# Patient Record
Sex: FEMALE | Race: WHITE | Employment: OTHER | URBAN - NONMETROPOLITAN AREA
[De-identification: names, ages, dates, MRNs, and addresses within clinical notes are randomized per-mention and may not be internally consistent; named-entity substitution may affect disease eponyms.]

---

## 2018-03-08 ENCOUNTER — OFFICE VISIT (OUTPATIENT)
Dept: VASCULAR SURGERY | Age: 78
End: 2018-03-08
Payer: MEDICARE

## 2018-03-08 VITALS
DIASTOLIC BLOOD PRESSURE: 80 MMHG | HEIGHT: 66 IN | SYSTOLIC BLOOD PRESSURE: 138 MMHG | WEIGHT: 116.6 LBS | HEART RATE: 62 BPM | BODY MASS INDEX: 18.74 KG/M2

## 2018-03-08 DIAGNOSIS — L03.116 CELLULITIS OF LEFT LEG: ICD-10-CM

## 2018-03-08 DIAGNOSIS — I87.2 VENOUS INSUFFICIENCY (CHRONIC) (PERIPHERAL): ICD-10-CM

## 2018-03-08 DIAGNOSIS — I65.23 ASYMPTOMATIC BILATERAL CAROTID ARTERY STENOSIS: Primary | ICD-10-CM

## 2018-03-08 DIAGNOSIS — M79.89 LEFT LEG SWELLING: ICD-10-CM

## 2018-03-08 PROCEDURE — G8420 CALC BMI NORM PARAMETERS: HCPCS | Performed by: SURGERY

## 2018-03-08 PROCEDURE — G8484 FLU IMMUNIZE NO ADMIN: HCPCS | Performed by: SURGERY

## 2018-03-08 PROCEDURE — 99215 OFFICE O/P EST HI 40 MIN: CPT | Performed by: SURGERY

## 2018-03-08 PROCEDURE — 4004F PT TOBACCO SCREEN RCVD TLK: CPT | Performed by: SURGERY

## 2018-03-08 PROCEDURE — 1090F PRES/ABSN URINE INCON ASSESS: CPT | Performed by: SURGERY

## 2018-03-08 PROCEDURE — 4040F PNEUMOC VAC/ADMIN/RCVD: CPT | Performed by: SURGERY

## 2018-03-08 PROCEDURE — G8427 DOCREV CUR MEDS BY ELIG CLIN: HCPCS | Performed by: SURGERY

## 2018-03-08 PROCEDURE — 1123F ACP DISCUSS/DSCN MKR DOCD: CPT | Performed by: SURGERY

## 2018-03-08 PROCEDURE — G8598 ASA/ANTIPLAT THER USED: HCPCS | Performed by: SURGERY

## 2018-03-08 PROCEDURE — G8400 PT W/DXA NO RESULTS DOC: HCPCS | Performed by: SURGERY

## 2018-03-08 NOTE — PROGRESS NOTES
[address] [facility] [address]  [facility] VASCULAR SURG  [phone]  Meme  1940  69 y. o.female       Chief Complaint:  Chief Complaint   Patient presents with    Other     redness and swelling right leg        History of present Illness:  Pt is here today for evaluation and treatment of her venous insufficiency and carotid stenosis. Recently she was admitted at [facility] with extensive left leg swelling. She went underwent an extensive workup which was negative. There is no evidence of deep venous thrombosis. Presently she is on doxycycline for cellulitis. Her leg is been too big for her to get stockings on. On questioning her she denies any amaurosis fugax or lateralizing symptoms. Past Medical History:  has a past medical history of Asthma; Backache, unspecified; Breast cancer, left ([address] Chronic venous insufficiency; COPD (chronic obstructive pulmonary disease) ([address] Coronary atherosclerosis of unspecified type of vessel, native or graft; Edema; History of tobacco abuse; Hyperlipidemia; Hypertension; Hypertension; Osteoarthritis; PVD (peripheral vascular disease) ([address] Superficial thrombosis of leg; Unspecified sinusitis (chronic); Unspecified urinary incontinence; and Unspecified vitamin D deficiency. Past Surgical History:   Past Surgical History:   Procedure Laterality Date    APPENDECTOMY      EYE SURGERY Bilateral 12/2015    cataracts/ROB [place]    HYSTERECTOMY, VAGINAL  8/13/1974    JOINT REPLACEMENT Right     Knee    MASTECTOMY Left        Social History:  reports that she has been smoking Cigarettes. She has a 47.00 pack-year smoking history. She has never used smokeless tobacco. She reports that she does not drink alcohol or use drugs. Family History: family history includes Arthritis in her father and mother; Cancer in her sister; Heart Disease in her brother;  Other in her

## 2018-04-13 ENCOUNTER — HOSPITAL ENCOUNTER (OUTPATIENT)
Dept: INTERVENTIONAL RADIOLOGY/VASCULAR | Age: 78
Discharge: HOME | End: 2018-04-15
Payer: MEDICARE

## 2018-04-13 DIAGNOSIS — I65.23 ASYMPTOMATIC BILATERAL CAROTID ARTERY STENOSIS: ICD-10-CM

## 2018-04-13 PROCEDURE — 93880 EXTRACRANIAL BILAT STUDY: CPT

## 2018-05-10 ENCOUNTER — OFFICE VISIT (OUTPATIENT)
Dept: VASCULAR SURGERY | Age: 78
End: 2018-05-10
Payer: MEDICARE

## 2018-05-10 VITALS
HEART RATE: 60 BPM | BODY MASS INDEX: 17.68 KG/M2 | DIASTOLIC BLOOD PRESSURE: 68 MMHG | RESPIRATION RATE: 16 BRPM | SYSTOLIC BLOOD PRESSURE: 150 MMHG | HEIGHT: 66 IN | WEIGHT: 110 LBS

## 2018-05-10 DIAGNOSIS — I87.2 VENOUS INSUFFICIENCY (CHRONIC) (PERIPHERAL): Primary | ICD-10-CM

## 2018-05-10 DIAGNOSIS — I65.23 ASYMPTOMATIC BILATERAL CAROTID ARTERY STENOSIS: ICD-10-CM

## 2018-05-10 DIAGNOSIS — I73.9 PVD (PERIPHERAL VASCULAR DISEASE) (HCC): ICD-10-CM

## 2018-05-10 DIAGNOSIS — I87.2 VENOUS INSUFFICIENCY OF BOTH LOWER EXTREMITIES: ICD-10-CM

## 2018-05-10 DIAGNOSIS — I87.2 VENOUS INSUFFICIENCY: ICD-10-CM

## 2018-05-10 PROCEDURE — G8427 DOCREV CUR MEDS BY ELIG CLIN: HCPCS | Performed by: SURGERY

## 2018-05-10 PROCEDURE — G8400 PT W/DXA NO RESULTS DOC: HCPCS | Performed by: SURGERY

## 2018-05-10 PROCEDURE — 99214 OFFICE O/P EST MOD 30 MIN: CPT | Performed by: SURGERY

## 2018-05-10 PROCEDURE — 4004F PT TOBACCO SCREEN RCVD TLK: CPT | Performed by: SURGERY

## 2018-05-10 PROCEDURE — 4040F PNEUMOC VAC/ADMIN/RCVD: CPT | Performed by: SURGERY

## 2018-05-10 PROCEDURE — 1123F ACP DISCUSS/DSCN MKR DOCD: CPT | Performed by: SURGERY

## 2018-05-10 PROCEDURE — 1090F PRES/ABSN URINE INCON ASSESS: CPT | Performed by: SURGERY

## 2018-05-10 PROCEDURE — G8419 CALC BMI OUT NRM PARAM NOF/U: HCPCS | Performed by: SURGERY

## 2018-05-10 PROCEDURE — G8598 ASA/ANTIPLAT THER USED: HCPCS | Performed by: SURGERY

## 2018-05-24 ENCOUNTER — TELEPHONE (OUTPATIENT)
Dept: VASCULAR SURGERY | Age: 78
End: 2018-05-24

## 2018-06-01 DIAGNOSIS — I87.2 VENOUS INSUFFICIENCY OF BOTH LOWER EXTREMITIES: ICD-10-CM

## 2018-06-01 DIAGNOSIS — I65.23 ASYMPTOMATIC BILATERAL CAROTID ARTERY STENOSIS: ICD-10-CM

## 2018-06-01 DIAGNOSIS — I87.323 IDIOPATHIC CHRONIC VENOUS HYPERTENSION OF BOTH LOWER EXTREMITIES WITH INFLAMMATION: ICD-10-CM

## 2018-06-01 DIAGNOSIS — I73.9 PVD (PERIPHERAL VASCULAR DISEASE) (HCC): Primary | ICD-10-CM

## 2018-06-01 DIAGNOSIS — I87.2 VENOUS INSUFFICIENCY (CHRONIC) (PERIPHERAL): ICD-10-CM

## 2018-06-08 ENCOUNTER — HOSPITAL ENCOUNTER (OUTPATIENT)
Dept: INTERVENTIONAL RADIOLOGY/VASCULAR | Age: 78
Discharge: HOME | End: 2018-06-10
Payer: MEDICARE

## 2018-06-08 DIAGNOSIS — I87.2 VENOUS INSUFFICIENCY OF BOTH LOWER EXTREMITIES: ICD-10-CM

## 2018-06-08 DIAGNOSIS — I87.323 IDIOPATHIC CHRONIC VENOUS HYPERTENSION OF BOTH LOWER EXTREMITIES WITH INFLAMMATION: ICD-10-CM

## 2018-06-08 PROCEDURE — 93970 EXTREMITY STUDY: CPT

## 2018-07-12 ENCOUNTER — OFFICE VISIT (OUTPATIENT)
Dept: VASCULAR SURGERY | Age: 78
End: 2018-07-12
Payer: MEDICARE

## 2018-07-12 VITALS
BODY MASS INDEX: 21.71 KG/M2 | HEART RATE: 84 BPM | DIASTOLIC BLOOD PRESSURE: 60 MMHG | WEIGHT: 110.6 LBS | HEIGHT: 60 IN | SYSTOLIC BLOOD PRESSURE: 136 MMHG

## 2018-07-12 DIAGNOSIS — I80.02 SUPERFICIAL PHLEBITIS AND THROMBOPHLEBITIS OF LEFT LOWER EXTREMITY: ICD-10-CM

## 2018-07-12 DIAGNOSIS — I87.2 VENOUS INSUFFICIENCY OF BOTH LOWER EXTREMITIES: Primary | ICD-10-CM

## 2018-07-12 DIAGNOSIS — I65.23 CAROTID STENOSIS, ASYMPTOMATIC, BILATERAL: ICD-10-CM

## 2018-07-12 PROCEDURE — G8427 DOCREV CUR MEDS BY ELIG CLIN: HCPCS | Performed by: SURGERY

## 2018-07-12 PROCEDURE — 1123F ACP DISCUSS/DSCN MKR DOCD: CPT | Performed by: SURGERY

## 2018-07-12 PROCEDURE — 4004F PT TOBACCO SCREEN RCVD TLK: CPT | Performed by: SURGERY

## 2018-07-12 PROCEDURE — 1101F PT FALLS ASSESS-DOCD LE1/YR: CPT | Performed by: SURGERY

## 2018-07-12 PROCEDURE — G8400 PT W/DXA NO RESULTS DOC: HCPCS | Performed by: SURGERY

## 2018-07-12 PROCEDURE — 99214 OFFICE O/P EST MOD 30 MIN: CPT | Performed by: SURGERY

## 2018-07-12 PROCEDURE — 1090F PRES/ABSN URINE INCON ASSESS: CPT | Performed by: SURGERY

## 2018-07-12 PROCEDURE — G8598 ASA/ANTIPLAT THER USED: HCPCS | Performed by: SURGERY

## 2018-07-12 PROCEDURE — G8420 CALC BMI NORM PARAMETERS: HCPCS | Performed by: SURGERY

## 2018-07-12 PROCEDURE — 4040F PNEUMOC VAC/ADMIN/RCVD: CPT | Performed by: SURGERY

## 2018-07-12 NOTE — PROGRESS NOTES
[address] [facility]  [facility] VASCULAR SURG  [phone]  Elsybelnelly  1940  69 y. o.female       Chief Complaint:  Chief Complaint   Patient presents with    Other     follow up with venous duplex        History of present Illness:  Seen following her recent vascular testing. Left lower extremity venous duplex of venous insufficiency protocol does demonstrate some mild superficial femoral phlebitis involving a few varicosities. There is no evidence of deep venous some pulses. Reflux was noted in the left popliteal vein and the left lesser saphenous vein. Since her last visit her lower extremity swelling has resolved however she continues to complain of some burning in both legs. Past Medical History:  has a past medical history of Asthma; Backache, unspecified; Breast cancer, left ([address] Chronic venous insufficiency; COPD (chronic obstructive pulmonary disease) ([address] Coronary atherosclerosis of unspecified type of vessel, native or graft; Edema; History of tobacco abuse; Hyperlipidemia; Hypertension; Hypertension; Osteoarthritis; PVD (peripheral vascular disease) ([address] Superficial thrombosis of leg; Unspecified sinusitis (chronic); Unspecified urinary incontinence; and Unspecified vitamin D deficiency. Past Surgical History:   Past Surgical History:   Procedure Laterality Date    APPENDECTOMY      EYE SURGERY Bilateral 12/2015    cataracts/ROB Senior[place]    HYSTERECTOMY, VAGINAL  8/13/1974    JOINT REPLACEMENT Right     Knee    MASTECTOMY Left        Social History:  reports that she has been smoking Cigarettes. She has a 47.00 pack-year smoking history. She has never used smokeless tobacco. She reports that she does not drink alcohol or use drugs. Family History: family history includes Arthritis in her father and mother; Cancer in her sister; Heart Disease in her brother;  Other in her

## 2019-01-01 ENCOUNTER — NURSE ONLY (OUTPATIENT)
Dept: UROLOGY | Age: 79
End: 2019-01-01
Payer: MEDICARE

## 2019-01-01 VITALS
DIASTOLIC BLOOD PRESSURE: 70 MMHG | BODY MASS INDEX: 17.16 KG/M2 | HEART RATE: 72 BPM | WEIGHT: 100.53 LBS | SYSTOLIC BLOOD PRESSURE: 134 MMHG | HEIGHT: 64 IN

## 2019-01-01 VITALS
OXYGEN SATURATION: 94 % | SYSTOLIC BLOOD PRESSURE: 138 MMHG | HEIGHT: 64 IN | WEIGHT: 100.53 LBS | DIASTOLIC BLOOD PRESSURE: 76 MMHG | RESPIRATION RATE: 20 BRPM | HEART RATE: 78 BPM | BODY MASS INDEX: 17.16 KG/M2

## 2019-01-01 DIAGNOSIS — R33.9 URINARY RETENTION: Primary | ICD-10-CM

## 2019-01-01 PROCEDURE — 51702 INSERT TEMP BLADDER CATH: CPT | Performed by: UROLOGY

## 2019-01-01 PROCEDURE — 51705 CHANGE OF BLADDER TUBE: CPT | Performed by: UROLOGY

## 2019-04-05 ENCOUNTER — TELEPHONE (OUTPATIENT)
Dept: UROLOGY | Age: 79
End: 2019-04-05

## 2019-04-05 NOTE — TELEPHONE ENCOUNTER
Patient informed that the 24th is the next time  Is in [facility]. Patient requested the [facility] office number.

## 2019-04-24 ENCOUNTER — OFFICE VISIT (OUTPATIENT)
Dept: UROLOGY | Age: 79
End: 2019-04-24
Payer: MEDICARE

## 2019-04-24 VITALS
BODY MASS INDEX: 21.73 KG/M2 | WEIGHT: 110.67 LBS | SYSTOLIC BLOOD PRESSURE: 110 MMHG | HEIGHT: 60 IN | HEART RATE: 60 BPM | DIASTOLIC BLOOD PRESSURE: 62 MMHG

## 2019-04-24 DIAGNOSIS — R33.9 INCOMPLETE BLADDER EMPTYING: Primary | ICD-10-CM

## 2019-04-24 PROCEDURE — 99203 OFFICE O/P NEW LOW 30 MIN: CPT | Performed by: UROLOGY

## 2019-04-24 NOTE — PROGRESS NOTES
Dr. Uday King MD  [facility] Urology Clinic Consultation / New Patient Visit    Patient:  Jamel Mendez  YOB: 1940  Date: 4/24/2019  Consult requested from Tariq Wagner MD     HISTORY OF PRESENT ILLNESS:   The patient is a 69 y.o. female who presents today for follow-up for the following problem(s): incomplete bladder emptying  Overall the problem(s) : are improving. Associated Symptoms: No dysuria, gross hematuria. Pain Severity:      Today visit:   4/24/19  Inpatient consult  Had catheter placed for incomplete bladder emptying, and urinary retention  Suspected secondary to immobility  She states she is doing better now  Tolerating catheter well    Summary of old records:   (Patient's old records, notes and chart reviewed and summarized above.)    Urinalysis today:  No results found for this visit on 04/24/19.     Last BUN and creatinine:  No results found for: BUN  No results found for: CREATININE    Imaging Reviewed during this Office Visit:   (results were independently reviewed by physician and radiology report verified)    PAST MEDICAL, FAMILY AND SOCIAL HISTORY:  Past Medical History:   Diagnosis Date    Asthma     Backache, unspecified     Breast cancer, left ([address]     Chronic venous insufficiency     COPD (chronic obstructive pulmonary disease) ([address]     Coronary atherosclerosis of unspecified type of vessel, native or graft     Edema     History of tobacco abuse     Hyperlipidemia     Hypertension     Hypertension     Osteoarthritis     PVD (peripheral vascular disease) ([address]     Superficial thrombosis of leg     Unspecified sinusitis (chronic)     Unspecified urinary incontinence     Unspecified vitamin D deficiency      Past Surgical History:   Procedure Laterality Date    APPENDECTOMY      EYE SURGERY Bilateral 12/2015    cataracts/Jeramie Senior    HYSTERECTOMY, VAGINAL  8/13/1974    JOINT REPLACEMENT Right     Knee    MASTECTOMY Left      Family History   Problem Relation Age of Onset    Other Mother         COPD    Arthritis Mother     Arthritis Father     Cancer Sister         breast    Heart Disease Brother      No outpatient medications have been marked as taking for the 4/24/19 encounter (Office Visit) with Yu Irwin MD.       Codeine  Social History     Tobacco Use   Smoking Status Current Every Day Smoker    Packs/day: 1.00    Years: 47.00    Pack years: 47.00    Types: Cigarettes   Smokeless Tobacco Never Used       Social History     Substance and Sexual Activity   Alcohol Use No    Alcohol/week: 0.0 oz       REVIEW OF SYSTEMS:  Constitutional: negative  Eyes: negative  Respiratory: negative  Cardiovascular: negative  Gastrointestinal: negative  Genitourinary: negative  Musculoskeletal: negative  Skin: negative   Neurological: negative  Hematological/Lymphatic: negative  Psychological: negative    Physical Exam:    This a 69 y.o. female      Vitals:    04/24/19 0915   BP: 110/62   Pulse: 60     Constitutional: Patient in no acute distress, in wheelchair  Neuro: alert and oriented to person place and time. Psych: Mood and affect normal.  Head: atraumatic normocephalic  Eyes: EOMi  HEENT: neck supple, trachea midline  Lungs: Respiratory effort normal  Cardiovascular:  Normal peripheral pulses  Abdomen: Soft, non-tender, non-distended, No CVA  Bladder: non-tender and not distended. FROMx4, no cyanosis clubbing edema  Skin: warm and dry  [place]arez: clear yellow urine    Assessment and Plan      1. Incomplete bladder emptying           Plan:      No follow-ups on file. Remove catheter today  Timed voiding  Will schedule Cysto Urodynamics to further evaluate  If unable to void, she may need catheter replaced.

## 2019-04-26 ENCOUNTER — NURSE ONLY (OUTPATIENT)
Dept: UROLOGY | Age: 79
End: 2019-04-26
Payer: MEDICARE

## 2019-04-26 VITALS
BODY MASS INDEX: 21.73 KG/M2 | DIASTOLIC BLOOD PRESSURE: 64 MMHG | WEIGHT: 110.67 LBS | SYSTOLIC BLOOD PRESSURE: 112 MMHG | HEIGHT: 60 IN | HEART RATE: 87 BPM

## 2019-04-26 DIAGNOSIS — R33.9 URINARY RETENTION: ICD-10-CM

## 2019-04-26 DIAGNOSIS — R33.9 INCOMPLETE BLADDER EMPTYING: Primary | ICD-10-CM

## 2019-04-26 PROCEDURE — 51702 INSERT TEMP BLADDER CATH: CPT | Performed by: UROLOGY

## 2019-04-26 PROCEDURE — 51798 US URINE CAPACITY MEASURE: CPT | Performed by: UROLOGY

## 2019-04-26 NOTE — PROGRESS NOTES
Patient presented for a PVR. States he has urinated except for \"a few dribbles\" since getting her catheter pulled on 4/24/19 Post void residual by bladder scanner 1,000cc. Following Dr. Kathryn James of care:    Changed 16 FR urethral ruiz catheter inserted without difficulty. 16 FR urethral ruiz was inserted without difficulty. Upon urine return, balloon inflated with 10cc sterile water. Urethral Ruiz catheter was hooked up to leg bag with straps. Patient instructed on catheter care including draining catheter bag and keeping catheter bag above the knee to prevent pulling on catheter causing blood. Patient will follow up for cysto/urodynamincs at [facility] with Dr. Simi Wilder as planned.

## 2019-04-30 ENCOUNTER — INITIAL CONSULT (OUTPATIENT)
Dept: SURGERY | Age: 79
End: 2019-04-30
Payer: MEDICARE

## 2019-04-30 VITALS
SYSTOLIC BLOOD PRESSURE: 115 MMHG | DIASTOLIC BLOOD PRESSURE: 76 MMHG | WEIGHT: 122.3 LBS | BODY MASS INDEX: 22.5 KG/M2 | OXYGEN SATURATION: 96 % | HEART RATE: 75 BPM | HEIGHT: 62 IN

## 2019-04-30 DIAGNOSIS — K80.20 GALLSTONES: ICD-10-CM

## 2019-04-30 DIAGNOSIS — R19.7 DIARRHEA, UNSPECIFIED TYPE: ICD-10-CM

## 2019-04-30 DIAGNOSIS — R11.2 NAUSEA AND VOMITING, INTRACTABILITY OF VOMITING NOT SPECIFIED, UNSPECIFIED VOMITING TYPE: Primary | ICD-10-CM

## 2019-04-30 PROCEDURE — 99203 OFFICE O/P NEW LOW 30 MIN: CPT | Performed by: SURGERY

## 2019-04-30 RX ORDER — CELECOXIB 200 MG/1
200 CAPSULE ORAL DAILY
COMMUNITY
End: 2020-01-01

## 2019-04-30 RX ORDER — FERROUS SULFATE 325(65) MG
325 TABLET ORAL
COMMUNITY
End: 2020-01-01

## 2019-04-30 NOTE — PROGRESS NOTES
Subjective   Teetee Allen is a 69 y.o. female with multiple medical comorbidities who presents for evaluation of gallstones. She is referred by her PCP. Patient is here with her daughter today who helps provide some of the medical history. Patient reports that several weeks ago she began to experience episodes of nausea and emesis that occurred sporadically throughout the day. At about the same time she states that she was having multiple bouts of diarrhea. Since the onset of symptoms she does report that her symptoms have seemed to have improved drastically and she only has occasional nausea and emesis now and the diarrhea seems to mostly have resolved. Of note she states that these symptoms started soon after she had back surgery. Her surgery seems to been complicated by significant urinary retention and she still has catheter in place. She has been seen by urology for these issues and is scheduled to undergo urodynamics for further evaluation. During her issues with nausea and emesis she did see her PCP and workup was undertaken which included a CT scan. Review of the CT scan shows that the patient doesn't fact have gallstones and there was some mild distention of the gallbladder without any signs of acute inflammation. Due to the patient's symptoms and CT findings he was referred to general surgery. On further questioning today the patient denies any abdominal pain that accompanied her symptoms and states she has never had any significant abdominal pain. She does report back pain that was present prior to the finding of urinary retention but she states once the catheter was placed the symptoms seem to have resolved.     Past Medical History:   Diagnosis Date    Asthma     Backache, unspecified     Breast cancer, left (HCC)     Chronic venous insufficiency     COPD (chronic obstructive pulmonary disease) (HCC)     Coronary atherosclerosis of unspecified type of vessel, native or graft     Edema  History of tobacco abuse     Hyperlipidemia     Hypertension     Hypertension     Osteoarthritis     PVD (peripheral vascular disease) (HCC)     Superficial thrombosis of leg     Unspecified sinusitis (chronic)     Unspecified urinary incontinence     Unspecified vitamin D deficiency        Past Surgical History:   Procedure Laterality Date    APPENDECTOMY      COLONOSCOPY      EYE SURGERY Bilateral 12/2015    cataracts/ROB [place]    HYSTERECTOMY, VAGINAL  8/13/1974    JOINT REPLACEMENT Right     Knee    MASTECTOMY Left     SHOULDER ARTHROPLASTY Left 2016       Current Outpatient Medications   Medication Sig Dispense Refill    celecoxib (CELEBREX) 200 MG capsule Take 200 mg by mouth daily      ferrous sulfate 325 (65 Fe) MG tablet Take 325 mg by mouth daily (with breakfast)      FLOVENT  MCG/ACT inhaler       PROVENTIL  (90 BASE) MCG/ACT inhaler       NONFORMULARY daily. VITAMIN D3 1000 units/CALCIUM 1200 units.  atorvastatin (LIPITOR) 10 MG tablet Take 10 mg by mouth daily.  warfarin (COUMADIN) 4 MG tablet Take 4 mg by mouth daily.  amLODIPine (NORVASC) 10 MG tablet Take 10 mg by mouth daily.  losartan-hydrochlorothiazide (HYZAAR) 50-12.5 MG per tablet Take 1 tablet by mouth daily.  montelukast (SINGULAIR) 10 MG tablet Take 10 mg by mouth nightly.  Cholecalciferol (VITAMIN D3) 1000 UNITS TABS Take  by mouth daily.  Calcium Carbonate-Vit D-Min (CALCIUM 1200 PO) Take  by mouth daily.  Acetaminophen (TYLENOL PO) Take  by mouth as needed. No current facility-administered medications for this visit.         Allergies   Allergen Reactions    Codeine        Family History   Problem Relation Age of Onset    Other Mother         COPD    Arthritis Mother     Arthritis Father     Cancer Sister         breast    Heart Disease Brother        Social History     Socioeconomic History    Marital status:      Spouse name: Not on file  Number of children: Not on file    Years of education: Not on file    Highest education level: Not on file   Occupational History    Not on file   Social Needs    Financial resource strain: Not on file    Food insecurity:     Worry: Not on file     Inability: Not on file    Transportation needs:     Medical: Not on file     Non-medical: Not on file   Tobacco Use    Smoking status: Current Every Day Smoker     Packs/day: 1.00     Years: 47.00     Pack years: 47.00     Types: Cigarettes    Smokeless tobacco: Never Used   Substance and Sexual Activity    Alcohol use: No     Alcohol/week: 0.0 oz    Drug use: No    Sexual activity: Not on file   Lifestyle    Physical activity:     Days per week: Not on file     Minutes per session: Not on file    Stress: Not on file   Relationships    Social connections:     Talks on phone: Not on file     Gets together: Not on file     Attends [employer] service: Not on file     Active member of club or organization: Not on file     Attends meetings of clubs or organizations: Not on file     Relationship status: Not on file    Intimate partner violence:     Fear of current or ex partner: Not on file     Emotionally abused: Not on file     Physically abused: Not on file     Forced sexual activity: Not on file   Other Topics Concern    Not on file   Social History Narrative    Not on file       ROS:   Review of Systems - General ROS: negative for - chills, fever or weight loss  Psychological ROS: negative  Ophthalmic ROS: negative  ENT ROS: Pt reports occasional difficulty swallowing liquids. No issues with swallowing solid foods. Allergy and Immunology ROS: negative  Hematological and Lymphatic ROS: negative  Endocrine ROS: negative  Respiratory ROS: Pt has shortness of breath at baseline.   Long time smoker  Cardiovascular ROS: no chest pain   Gastrointestinal ROS: per HPI  Genito-Urinary ROS: no dysuria, trouble voiding, or hematuria  Musculoskeletal ROS: negative      Objective   Vitals:    04/30/19 1404   BP: 115/76   Pulse: 75   SpO2: 96%     General:in no apparent distress, well developed and well nourished, alert and oriented times 3  Eyes: PERRL and EOMI  Ears, Nose, Throat: external ear and ear canal normal bilaterally, oropharynx clear and moist with normal mucous membranes  Neck: neck supple and non tender without mass  Lungs: Diminished breath sounds bilaterally, no wheezes or rhonchi   Heart: S1S2, no mumurs, RRR  Abdomen: soft, non tender, non distended, bowel sounds presnt  Extremity: Pt has bilateral pitting edema  Neuro: CN II-XII grossly intact      Assessment     3  41-year-old female with symptoms of nausea and vomiting as well as diarrhea-per patient report symptoms have improved drastically over the past week  2. Recent kyphoplasty  3. Urinary retention - Urology following and pt having work up    Plan     1. After discussion with the patient today and review of available records patient doesn't fact have gallstones with some mild dilation of gallbladder but with no overt signs of acute cholecystitis. I discussed with the patient today that while gallstones can certainly cause symptoms of nausea, emesis, and even diarrhea there is no acute need to proceed with any surgical intervention to remove her gallbladder. As the patient's symptoms started soon after her kyphoplasty and urinary retention and have now improved since her urinary retention is being addressed I cannot completely rule out that perhaps her symptoms were related to these issues and not to any gallbladder etiology. As the patient has no acute need for cholecystectomy and based on her medical comorbidities I discussed with her that I would recommend holding off on surgery at this time and would like to see her other issues with her back and urinary retention addressed and controlled.   Should she continue to have symptoms after these issues are under good control we can revisit the gallbladder and possible cholecystectomy. However I also discussed with her that based on her comorbidities and healthy at baseline she would be higher risk for surgery. 2.  I discussed with the patient signs and symptoms to be aware of including development of right upper quadrant pain, worsening of her nausea or emesis, systemic signs of infection and jaundice has potential complications of gallstone migration. I discussed with her that should she have any these symptoms she should call my office or come to the ER for further evaluation. 3.  Pt will continue follow up with pain management, urology and orthopedics. 4.  F/u with general surgery as needed.     Electronically signed by Carol Lion DO on 4/30/2019 at 2:28 PM

## 2019-05-09 ENCOUNTER — TELEPHONE (OUTPATIENT)
Dept: UROLOGY | Age: 79
End: 2019-05-09

## 2019-05-21 ENCOUNTER — TELEPHONE (OUTPATIENT)
Dept: UROLOGY | Age: 79
End: 2019-05-21

## 2019-05-21 NOTE — TELEPHONE ENCOUNTER
Jaki Johnson phoned wondering when her one month appt was to see Dr. Robyn Prather .   I did not see anything scheduled please call patient I believe she has a folley cath /

## 2019-05-23 ENCOUNTER — TELEPHONE (OUTPATIENT)
Dept: UROLOGY | Age: 79
End: 2019-05-23

## 2019-05-23 NOTE — TELEPHONE ENCOUNTER
LM- patient is going to be set up for cysto/UD at [facility]- which is scheduled through [facility]. I let her know they will be reaching out to her.

## 2019-05-23 NOTE — TELEPHONE ENCOUNTER
Patient called and asked when she is scheduled for [facility] with [address] I see in chart where she was scheduled for June 5th at 9:30 am but this was cancelled. Is the patient rescheduled or does this need to be done? Patient request a call back with new date and time for appt.

## 2019-06-11 DIAGNOSIS — R52 PAIN: Primary | ICD-10-CM

## 2019-06-14 ENCOUNTER — OFFICE VISIT (OUTPATIENT)
Dept: ORTHOPEDIC SURGERY | Age: 79
End: 2019-06-14
Payer: MEDICARE

## 2019-06-14 VITALS — HEIGHT: 62 IN | WEIGHT: 122.36 LBS | BODY MASS INDEX: 22.52 KG/M2

## 2019-06-14 DIAGNOSIS — R60.9 CHRONIC EDEMA: ICD-10-CM

## 2019-06-14 DIAGNOSIS — G62.9 NEUROPATHY: Primary | ICD-10-CM

## 2019-06-14 PROCEDURE — 99203 OFFICE O/P NEW LOW 30 MIN: CPT | Performed by: ORTHOPAEDIC SURGERY

## 2019-06-17 ASSESSMENT — ENCOUNTER SYMPTOMS
EYE PAIN: 0
ABDOMINAL PAIN: 0
SHORTNESS OF BREATH: 0

## 2019-07-07 ASSESSMENT — ENCOUNTER SYMPTOMS
ABDOMINAL PAIN: 0
EYE PAIN: 0
SHORTNESS OF BREATH: 0

## 2019-07-17 ENCOUNTER — TELEPHONE (OUTPATIENT)
Dept: UROLOGY | Age: 79
End: 2019-07-17

## 2019-07-17 NOTE — TELEPHONE ENCOUNTER
Patient cancelled today's appointment due to no transportation. She will need rescheduled for a catheter exchange.   # [phone]

## 2019-07-18 ENCOUNTER — NURSE ONLY (OUTPATIENT)
Dept: UROLOGY | Age: 79
End: 2019-07-18
Payer: MEDICARE

## 2019-07-18 VITALS — BODY MASS INDEX: 22.52 KG/M2 | HEART RATE: 72 BPM | WEIGHT: 122.36 LBS | OXYGEN SATURATION: 98 % | HEIGHT: 62 IN

## 2019-07-18 DIAGNOSIS — R33.9 URINARY RETENTION: ICD-10-CM

## 2019-07-18 PROCEDURE — 51702 INSERT TEMP BLADDER CATH: CPT | Performed by: UROLOGY

## 2019-07-31 ENCOUNTER — OFFICE VISIT (OUTPATIENT)
Dept: UROLOGY | Age: 79
End: 2019-07-31
Payer: MEDICARE

## 2019-07-31 VITALS
WEIGHT: 122.36 LBS | SYSTOLIC BLOOD PRESSURE: 148 MMHG | OXYGEN SATURATION: 93 % | HEIGHT: 62 IN | BODY MASS INDEX: 22.52 KG/M2 | DIASTOLIC BLOOD PRESSURE: 62 MMHG | HEART RATE: 82 BPM

## 2019-07-31 DIAGNOSIS — R33.9 INCOMPLETE BLADDER EMPTYING: ICD-10-CM

## 2019-07-31 DIAGNOSIS — R33.9 URINARY RETENTION: Primary | ICD-10-CM

## 2019-07-31 DIAGNOSIS — N31.2 ATONIC BLADDER: ICD-10-CM

## 2019-07-31 PROCEDURE — 99213 OFFICE O/P EST LOW 20 MIN: CPT | Performed by: UROLOGY

## 2019-07-31 NOTE — PROGRESS NOTES
Dr. Jammie Zeng MD  [facility] Urology Clinic Consultation / Follow up Visit    Patient:  Jori Churchill  YOB: 1940  Date: 7/31/2019  Consult requested from Sarthak Lucio MD     HISTORY OF PRESENT ILLNESS:   The patient is a 69 y.o. female who presents today for follow-up for the following problem(s): incomplete bladder emptying  Overall the problem(s) : are improving. Associated Symptoms: No dysuria, gross hematuria. 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     Last BUN and creatinine:  No results found for: BUN  No results found for: CREATININE    Imaging Reviewed during this Office Visit:   (results were independently reviewed by physician and radiology report verified)    PAST MEDICAL, FAMILY AND SOCIAL HISTORY:  Past Medical History:   Diagnosis Date    Asthma     Backache, unspecified     Breast cancer, left ([address]     Chronic venous insufficiency     COPD (chronic obstructive pulmonary disease) ([address]     Coronary atherosclerosis of unspecified type of vessel, native or graft     Edema     History of tobacco abuse     Hyperlipidemia     Hypertension     Hypertension     Osteoarthritis     PVD (peripheral vascular disease) ([address]     Superficial thrombosis of leg     Unspecified sinusitis (chronic)     Unspecified urinary incontinence     Unspecified vitamin D deficiency      Past Surgical History:   Procedure Laterality Date    APPENDECTOMY      COLONOSCOPY      EYE SURGERY Bilateral 12/2015

## 2019-09-13 ENCOUNTER — NURSE ONLY (OUTPATIENT)
Dept: UROLOGY | Age: 79
End: 2019-09-13
Payer: MEDICARE

## 2019-09-13 VITALS
HEIGHT: 62 IN | SYSTOLIC BLOOD PRESSURE: 122 MMHG | WEIGHT: 122.36 LBS | DIASTOLIC BLOOD PRESSURE: 62 MMHG | HEART RATE: 76 BPM | BODY MASS INDEX: 22.52 KG/M2

## 2019-09-13 DIAGNOSIS — R33.9 URINARY RETENTION: ICD-10-CM

## 2019-09-13 PROCEDURE — 51701 INSERT BLADDER CATHETER: CPT | Performed by: UROLOGY

## 2019-10-14 ENCOUNTER — HOSPITAL ENCOUNTER (INPATIENT)
Dept: CARDIAC CATH/INVASIVE PROCEDURES | Age: 79
LOS: 1 days | Discharge: HOME | DRG: 247 | End: 2019-10-15
Attending: INTERNAL MEDICINE | Admitting: INTERNAL MEDICINE
Payer: MEDICARE

## 2019-10-14 DIAGNOSIS — Z95.820 S/P ANGIOPLASTY WITH STENT: ICD-10-CM

## 2019-10-14 LAB
GFR NON-AFRICAN AMERICAN: 36 ML/MIN
GFR SERPL CREATININE-BSD FRML MDRD: 43 ML/MIN
GFR SERPL CREATININE-BSD FRML MDRD: ABNORMAL ML/MIN/{1.73_M2}
GLUCOSE BLD-MCNC: 81 MG/DL (ref 74–100)
PLATELET # BLD: 149 K/UL (ref 138–453)
POC CHLORIDE: 104 MMOL/L (ref 98–107)
POC CREATININE: 1.42 MG/DL (ref 0.51–1.19)
POC HEMATOCRIT: 38 % (ref 36–46)
POC HEMOGLOBIN: 12.8 G/DL (ref 12–16)
POC INR: 1.3
POC POTASSIUM: 3.8 MMOL/L (ref 3.5–4.5)
POC SODIUM: 143 MMOL/L (ref 138–146)
PROTHROMBIN TIME, POC: 15.8 SEC (ref 10.4–14.2)

## 2019-10-14 PROCEDURE — B2151ZZ FLUOROSCOPY OF LEFT HEART USING LOW OSMOLAR CONTRAST: ICD-10-PCS | Performed by: INTERNAL MEDICINE

## 2019-10-14 PROCEDURE — 2580000003 HC RX 258: Performed by: STUDENT IN AN ORGANIZED HEALTH CARE EDUCATION/TRAINING PROGRAM

## 2019-10-14 PROCEDURE — 99203 OFFICE O/P NEW LOW 30 MIN: CPT | Performed by: THORACIC SURGERY (CARDIOTHORACIC VASCULAR SURGERY)

## 2019-10-14 PROCEDURE — 6360000002 HC RX W HCPCS: Performed by: INTERNAL MEDICINE

## 2019-10-14 PROCEDURE — C1894 INTRO/SHEATH, NON-LASER: HCPCS

## 2019-10-14 PROCEDURE — 82947 ASSAY GLUCOSE BLOOD QUANT: CPT

## 2019-10-14 PROCEDURE — B4101ZZ FLUOROSCOPY OF ABDOMINAL AORTA USING LOW OSMOLAR CONTRAST: ICD-10-PCS | Performed by: INTERNAL MEDICINE

## 2019-10-14 PROCEDURE — C9600 PERC DRUG-EL COR STENT SING: HCPCS | Performed by: INTERNAL MEDICINE

## 2019-10-14 PROCEDURE — 6370000000 HC RX 637 (ALT 250 FOR IP): Performed by: STUDENT IN AN ORGANIZED HEALTH CARE EDUCATION/TRAINING PROGRAM

## 2019-10-14 PROCEDURE — C1769 GUIDE WIRE: HCPCS

## 2019-10-14 PROCEDURE — 93005 ELECTROCARDIOGRAM TRACING: CPT | Performed by: INTERNAL MEDICINE

## 2019-10-14 PROCEDURE — C1751 CATH, INF, PER/CENT/MIDLINE: HCPCS

## 2019-10-14 PROCEDURE — B2111ZZ FLUOROSCOPY OF MULTIPLE CORONARY ARTERIES USING LOW OSMOLAR CONTRAST: ICD-10-PCS | Performed by: INTERNAL MEDICINE

## 2019-10-14 PROCEDURE — 4A023N8 MEASUREMENT OF CARDIAC SAMPLING AND PRESSURE, BILATERAL, PERCUTANEOUS APPROACH: ICD-10-PCS | Performed by: INTERNAL MEDICINE

## 2019-10-14 PROCEDURE — 2500000003 HC RX 250 WO HCPCS

## 2019-10-14 PROCEDURE — 85014 HEMATOCRIT: CPT

## 2019-10-14 PROCEDURE — 7100000001 HC PACU RECOVERY - ADDTL 15 MIN

## 2019-10-14 PROCEDURE — 85610 PROTHROMBIN TIME: CPT

## 2019-10-14 PROCEDURE — 82435 ASSAY OF BLOOD CHLORIDE: CPT

## 2019-10-14 PROCEDURE — 7100000000 HC PACU RECOVERY - FIRST 15 MIN

## 2019-10-14 PROCEDURE — 84295 ASSAY OF SERUM SODIUM: CPT

## 2019-10-14 PROCEDURE — 6360000002 HC RX W HCPCS

## 2019-10-14 PROCEDURE — 82565 ASSAY OF CREATININE: CPT

## 2019-10-14 PROCEDURE — 6370000000 HC RX 637 (ALT 250 FOR IP)

## 2019-10-14 PROCEDURE — C1725 CATH, TRANSLUMIN NON-LASER: HCPCS

## 2019-10-14 PROCEDURE — 85049 AUTOMATED PLATELET COUNT: CPT

## 2019-10-14 PROCEDURE — 84132 ASSAY OF SERUM POTASSIUM: CPT

## 2019-10-14 PROCEDURE — 027035Z DILATION OF CORONARY ARTERY, ONE ARTERY WITH TWO DRUG-ELUTING INTRALUMINAL DEVICES, PERCUTANEOUS APPROACH: ICD-10-PCS | Performed by: INTERNAL MEDICINE

## 2019-10-14 PROCEDURE — 2709999900 HC NON-CHARGEABLE SUPPLY

## 2019-10-14 PROCEDURE — 6360000004 HC RX CONTRAST MEDICATION

## 2019-10-14 PROCEDURE — 93460 R&L HRT ART/VENTRICLE ANGIO: CPT | Performed by: INTERNAL MEDICINE

## 2019-10-14 PROCEDURE — C1874 STENT, COATED/COV W/DEL SYS: HCPCS

## 2019-10-14 PROCEDURE — 1200000000 HC SEMI PRIVATE

## 2019-10-14 PROCEDURE — G0278 ILIAC ART ANGIO,CARDIAC CATH: HCPCS | Performed by: INTERNAL MEDICINE

## 2019-10-14 PROCEDURE — C1887 CATHETER, GUIDING: HCPCS

## 2019-10-14 RX ORDER — SODIUM CHLORIDE 9 MG/ML
INJECTION, SOLUTION INTRAVENOUS CONTINUOUS
Status: DISCONTINUED | OUTPATIENT
Start: 2019-10-14 | End: 2019-10-15 | Stop reason: HOSPADM

## 2019-10-14 RX ORDER — FAMOTIDINE 20 MG/1
20 TABLET, FILM COATED ORAL DAILY
Status: DISCONTINUED | OUTPATIENT
Start: 2019-10-14 | End: 2019-10-15 | Stop reason: HOSPADM

## 2019-10-14 RX ORDER — LABETALOL 20 MG/4 ML (5 MG/ML) INTRAVENOUS SYRINGE
10 EVERY 30 MIN PRN
Status: DISCONTINUED | OUTPATIENT
Start: 2019-10-14 | End: 2019-10-15 | Stop reason: HOSPADM

## 2019-10-14 RX ORDER — SODIUM CHLORIDE 0.9 % (FLUSH) 0.9 %
10 SYRINGE (ML) INJECTION PRN
Status: DISCONTINUED | OUTPATIENT
Start: 2019-10-14 | End: 2019-10-15 | Stop reason: HOSPADM

## 2019-10-14 RX ORDER — CLOPIDOGREL BISULFATE 75 MG/1
75 TABLET ORAL DAILY
Status: DISCONTINUED | OUTPATIENT
Start: 2019-10-15 | End: 2019-10-15 | Stop reason: HOSPADM

## 2019-10-14 RX ORDER — ACETAMINOPHEN 325 MG/1
650 TABLET ORAL EVERY 4 HOURS PRN
Status: DISCONTINUED | OUTPATIENT
Start: 2019-10-14 | End: 2019-10-15 | Stop reason: HOSPADM

## 2019-10-14 RX ORDER — ONDANSETRON 2 MG/ML
4 INJECTION INTRAMUSCULAR; INTRAVENOUS EVERY 6 HOURS PRN
Status: DISCONTINUED | OUTPATIENT
Start: 2019-10-14 | End: 2019-10-15 | Stop reason: HOSPADM

## 2019-10-14 RX ORDER — ATORVASTATIN CALCIUM 80 MG/1
80 TABLET, FILM COATED ORAL NIGHTLY
Status: DISCONTINUED | OUTPATIENT
Start: 2019-10-14 | End: 2019-10-15 | Stop reason: HOSPADM

## 2019-10-14 RX ORDER — SODIUM CHLORIDE 0.9 % (FLUSH) 0.9 %
10 SYRINGE (ML) INJECTION EVERY 12 HOURS SCHEDULED
Status: DISCONTINUED | OUTPATIENT
Start: 2019-10-14 | End: 2019-10-15 | Stop reason: HOSPADM

## 2019-10-14 RX ORDER — ALBUTEROL SULFATE 2.5 MG/3ML
2.5 SOLUTION RESPIRATORY (INHALATION) EVERY 4 HOURS PRN
Status: DISCONTINUED | OUTPATIENT
Start: 2019-10-14 | End: 2019-10-15 | Stop reason: HOSPADM

## 2019-10-14 RX ORDER — ALBUTEROL SULFATE 1.25 MG/3ML
1 SOLUTION RESPIRATORY (INHALATION) EVERY 6 HOURS PRN
COMMUNITY

## 2019-10-14 RX ORDER — ALBUTEROL SULFATE 90 UG/1
2 AEROSOL, METERED RESPIRATORY (INHALATION) EVERY 4 HOURS PRN
Status: DISCONTINUED | OUTPATIENT
Start: 2019-10-14 | End: 2019-10-15 | Stop reason: HOSPADM

## 2019-10-14 RX ORDER — WARFARIN SODIUM 2 MG/1
4 TABLET ORAL DAILY
Status: DISCONTINUED | OUTPATIENT
Start: 2019-10-15 | End: 2019-10-15

## 2019-10-14 RX ORDER — ALBUTEROL SULFATE 2.5 MG/3ML
2.5 SOLUTION RESPIRATORY (INHALATION)
Status: DISCONTINUED | OUTPATIENT
Start: 2019-10-14 | End: 2019-10-14

## 2019-10-14 RX ORDER — IPRATROPIUM BROMIDE AND ALBUTEROL SULFATE 2.5; .5 MG/3ML; MG/3ML
1 SOLUTION RESPIRATORY (INHALATION) 4 TIMES DAILY
Status: DISCONTINUED | OUTPATIENT
Start: 2019-10-14 | End: 2019-10-15 | Stop reason: HOSPADM

## 2019-10-14 RX ORDER — FUROSEMIDE 40 MG/1
80 TABLET ORAL 2 TIMES DAILY
Status: ON HOLD | COMMUNITY
End: 2020-01-01 | Stop reason: HOSPADM

## 2019-10-14 RX ADMIN — FAMOTIDINE 20 MG: 20 TABLET, FILM COATED ORAL at 21:42

## 2019-10-14 RX ADMIN — SODIUM CHLORIDE: 9 INJECTION, SOLUTION INTRAVENOUS at 11:01

## 2019-10-14 RX ADMIN — SODIUM CHLORIDE: 9 INJECTION, SOLUTION INTRAVENOUS at 20:26

## 2019-10-14 RX ADMIN — ALBUTEROL SULFATE 2.5 MG: 2.5 SOLUTION RESPIRATORY (INHALATION) at 20:23

## 2019-10-14 RX ADMIN — Medication 10 ML: at 20:24

## 2019-10-14 RX ADMIN — ATORVASTATIN CALCIUM 80 MG: 80 TABLET, FILM COATED ORAL at 21:42

## 2019-10-15 ENCOUNTER — APPOINTMENT (OUTPATIENT)
Dept: CT IMAGING | Age: 79
DRG: 247 | End: 2019-10-15
Attending: INTERNAL MEDICINE
Payer: MEDICARE

## 2019-10-15 VITALS
DIASTOLIC BLOOD PRESSURE: 39 MMHG | SYSTOLIC BLOOD PRESSURE: 171 MMHG | WEIGHT: 100.5 LBS | HEART RATE: 63 BPM | BODY MASS INDEX: 17.16 KG/M2 | OXYGEN SATURATION: 96 % | HEIGHT: 64 IN | TEMPERATURE: 97.9 F | RESPIRATION RATE: 20 BRPM

## 2019-10-15 LAB
ANION GAP SERPL CALCULATED.3IONS-SCNC: 10 MMOL/L (ref 9–17)
BUN BLDV-MCNC: 25 MG/DL (ref 8–23)
BUN/CREAT BLD: ABNORMAL (ref 9–20)
CALCIUM SERPL-MCNC: 9 MG/DL (ref 8.6–10.4)
CHLORIDE BLD-SCNC: 109 MMOL/L (ref 98–107)
CO2: 26 MMOL/L (ref 20–31)
CREAT SERPL-MCNC: 1.03 MG/DL (ref 0.5–0.9)
EKG ATRIAL RATE: 59 BPM
EKG P AXIS: 83 DEGREES
EKG P-R INTERVAL: 144 MS
EKG Q-T INTERVAL: 416 MS
EKG QRS DURATION: 90 MS
EKG QTC CALCULATION (BAZETT): 411 MS
EKG R AXIS: 78 DEGREES
EKG T AXIS: -88 DEGREES
EKG VENTRICULAR RATE: 59 BPM
GFR AFRICAN AMERICAN: >60 ML/MIN
GFR NON-AFRICAN AMERICAN: 52 ML/MIN
GFR SERPL CREATININE-BSD FRML MDRD: ABNORMAL ML/MIN/{1.73_M2}
GFR SERPL CREATININE-BSD FRML MDRD: ABNORMAL ML/MIN/{1.73_M2}
GLUCOSE BLD-MCNC: 80 MG/DL (ref 70–99)
HCT VFR BLD CALC: 31.3 % (ref 36.3–47.1)
HEMOGLOBIN: 9.7 G/DL (ref 11.9–15.1)
INR BLD: 1.2
MCH RBC QN AUTO: 28.4 PG (ref 25.2–33.5)
MCHC RBC AUTO-ENTMCNC: 31 G/DL (ref 28.4–34.8)
MCV RBC AUTO: 91.8 FL (ref 82.6–102.9)
NRBC AUTOMATED: 0 PER 100 WBC
PDW BLD-RTO: 15.3 % (ref 11.8–14.4)
PLATELET # BLD: 155 K/UL (ref 138–453)
PMV BLD AUTO: 12.3 FL (ref 8.1–13.5)
POTASSIUM SERPL-SCNC: 3.8 MMOL/L (ref 3.7–5.3)
PROTHROMBIN TIME: 12.3 SEC (ref 9–12)
RBC # BLD: 3.41 M/UL (ref 3.95–5.11)
SODIUM BLD-SCNC: 145 MMOL/L (ref 135–144)
WBC # BLD: 3.9 K/UL (ref 3.5–11.3)

## 2019-10-15 PROCEDURE — 85027 COMPLETE CBC AUTOMATED: CPT

## 2019-10-15 PROCEDURE — 2700000000 HC OXYGEN THERAPY PER DAY

## 2019-10-15 PROCEDURE — 94060 EVALUATION OF WHEEZING: CPT

## 2019-10-15 PROCEDURE — 6370000000 HC RX 637 (ALT 250 FOR IP): Performed by: STUDENT IN AN ORGANIZED HEALTH CARE EDUCATION/TRAINING PROGRAM

## 2019-10-15 PROCEDURE — 93010 ELECTROCARDIOGRAM REPORT: CPT | Performed by: INTERNAL MEDICINE

## 2019-10-15 PROCEDURE — 94664 DEMO&/EVAL PT USE INHALER: CPT

## 2019-10-15 PROCEDURE — 94640 AIRWAY INHALATION TREATMENT: CPT

## 2019-10-15 PROCEDURE — 94761 N-INVAS EAR/PLS OXIMETRY MLT: CPT

## 2019-10-15 PROCEDURE — 85610 PROTHROMBIN TIME: CPT

## 2019-10-15 PROCEDURE — 36415 COLL VENOUS BLD VENIPUNCTURE: CPT

## 2019-10-15 PROCEDURE — 80048 BASIC METABOLIC PNL TOTAL CA: CPT

## 2019-10-15 PROCEDURE — 6370000000 HC RX 637 (ALT 250 FOR IP): Performed by: INTERNAL MEDICINE

## 2019-10-15 PROCEDURE — 93880 EXTRACRANIAL BILAT STUDY: CPT

## 2019-10-15 PROCEDURE — 2580000003 HC RX 258: Performed by: STUDENT IN AN ORGANIZED HEALTH CARE EDUCATION/TRAINING PROGRAM

## 2019-10-15 PROCEDURE — 97161 PT EVAL LOW COMPLEX 20 MIN: CPT

## 2019-10-15 RX ORDER — CLOPIDOGREL BISULFATE 75 MG/1
75 TABLET ORAL DAILY
Qty: 30 TABLET | Refills: 3 | Status: SHIPPED | OUTPATIENT
Start: 2019-10-16

## 2019-10-15 RX ORDER — WARFARIN SODIUM 3 MG/1
6 TABLET ORAL
Status: DISCONTINUED | OUTPATIENT
Start: 2019-10-15 | End: 2019-10-15 | Stop reason: HOSPADM

## 2019-10-15 RX ADMIN — METOPROLOL TARTRATE 12.5 MG: 25 TABLET ORAL at 08:28

## 2019-10-15 RX ADMIN — FAMOTIDINE 20 MG: 20 TABLET, FILM COATED ORAL at 08:28

## 2019-10-15 RX ADMIN — IPRATROPIUM BROMIDE AND ALBUTEROL SULFATE 1 AMPULE: .5; 3 SOLUTION RESPIRATORY (INHALATION) at 11:35

## 2019-10-15 RX ADMIN — CLOPIDOGREL 75 MG: 75 TABLET, FILM COATED ORAL at 08:28

## 2019-10-15 RX ADMIN — Medication 10 ML: at 08:28

## 2019-10-15 RX ADMIN — IPRATROPIUM BROMIDE AND ALBUTEROL SULFATE 1 AMPULE: .5; 3 SOLUTION RESPIRATORY (INHALATION) at 07:42

## 2019-10-15 ASSESSMENT — PAIN SCALES - GENERAL: PAINLEVEL_OUTOF10: 4

## 2020-01-01 ENCOUNTER — HOSPITAL ENCOUNTER (OUTPATIENT)
Age: 80
Setting detail: OUTPATIENT SURGERY
Discharge: HOME | End: 2020-02-27
Attending: SURGERY | Admitting: SURGERY
Payer: MEDICARE

## 2020-01-01 ENCOUNTER — PRE-PROCEDURE TELEPHONE (OUTPATIENT)
Dept: PREADMISSION TESTING | Age: 80
End: 2020-01-01

## 2020-01-01 ENCOUNTER — APPOINTMENT (OUTPATIENT)
Dept: GENERAL RADIOLOGY | Age: 80
DRG: 266 | End: 2020-01-01
Attending: INTERNAL MEDICINE
Payer: MEDICARE

## 2020-01-01 ENCOUNTER — TELEPHONE (OUTPATIENT)
Dept: SURGERY | Age: 80
End: 2020-01-01

## 2020-01-01 ENCOUNTER — ANESTHESIA (OUTPATIENT)
Dept: CARDIAC CATH/INVASIVE PROCEDURES | Age: 80
DRG: 266 | End: 2020-01-01
Payer: MEDICARE

## 2020-01-01 ENCOUNTER — NURSE ONLY (OUTPATIENT)
Dept: UROLOGY | Age: 80
End: 2020-01-01
Payer: MEDICARE

## 2020-01-01 ENCOUNTER — CARE COORDINATION (OUTPATIENT)
Dept: CASE MANAGEMENT | Age: 80
End: 2020-01-01

## 2020-01-01 ENCOUNTER — OFFICE VISIT (OUTPATIENT)
Dept: SURGERY | Age: 80
End: 2020-01-01
Payer: MEDICARE

## 2020-01-01 ENCOUNTER — TELEPHONE (OUTPATIENT)
Dept: OTHER | Age: 80
End: 2020-01-01

## 2020-01-01 ENCOUNTER — TELEPHONE (OUTPATIENT)
Dept: FAMILY MEDICINE CLINIC | Age: 80
End: 2020-01-01

## 2020-01-01 ENCOUNTER — OFFICE VISIT (OUTPATIENT)
Dept: PULMONOLOGY | Age: 80
End: 2020-01-01
Payer: MEDICARE

## 2020-01-01 ENCOUNTER — APPOINTMENT (OUTPATIENT)
Dept: GENERAL RADIOLOGY | Age: 80
End: 2020-01-01
Attending: SURGERY
Payer: MEDICARE

## 2020-01-01 ENCOUNTER — HOSPITAL ENCOUNTER (INPATIENT)
Dept: CARDIAC CATH/INVASIVE PROCEDURES | Age: 80
LOS: 3 days | Discharge: HOME | DRG: 266 | End: 2020-08-13
Attending: INTERNAL MEDICINE | Admitting: INTERNAL MEDICINE
Payer: MEDICARE

## 2020-01-01 ENCOUNTER — TELEPHONE (OUTPATIENT)
Dept: CARDIOLOGY | Age: 80
End: 2020-01-01

## 2020-01-01 ENCOUNTER — TELEPHONE (OUTPATIENT)
Dept: UROLOGY | Age: 80
End: 2020-01-01

## 2020-01-01 ENCOUNTER — TELEPHONE (OUTPATIENT)
Dept: PULMONOLOGY | Age: 80
End: 2020-01-01

## 2020-01-01 ENCOUNTER — ANESTHESIA (OUTPATIENT)
Dept: ENDOSCOPY | Age: 80
End: 2020-01-01
Payer: MEDICARE

## 2020-01-01 ENCOUNTER — APPOINTMENT (OUTPATIENT)
Dept: CARDIAC CATH/INVASIVE PROCEDURES | Age: 80
DRG: 266 | End: 2020-01-01
Attending: INTERNAL MEDICINE
Payer: MEDICARE

## 2020-01-01 ENCOUNTER — ANESTHESIA EVENT (OUTPATIENT)
Dept: CARDIAC CATH/INVASIVE PROCEDURES | Age: 80
DRG: 266 | End: 2020-01-01
Payer: MEDICARE

## 2020-01-01 ENCOUNTER — APPOINTMENT (OUTPATIENT)
Dept: GENERAL RADIOLOGY | Age: 80
DRG: 291 | End: 2020-01-01
Payer: MEDICARE

## 2020-01-01 ENCOUNTER — HOSPITAL ENCOUNTER (OUTPATIENT)
Dept: PREADMISSION TESTING | Age: 80
Discharge: HOME | End: 2020-02-25
Payer: MEDICARE

## 2020-01-01 ENCOUNTER — HOSPITAL ENCOUNTER (INPATIENT)
Age: 80
LOS: 6 days | Discharge: HOME HEALTH | DRG: 291 | End: 2020-09-21
Attending: STUDENT IN AN ORGANIZED HEALTH CARE EDUCATION/TRAINING PROGRAM | Admitting: INTERNAL MEDICINE
Payer: MEDICARE

## 2020-01-01 ENCOUNTER — ANESTHESIA EVENT (OUTPATIENT)
Dept: ENDOSCOPY | Age: 80
End: 2020-01-01
Payer: MEDICARE

## 2020-01-01 ENCOUNTER — TELEPHONE (OUTPATIENT)
Dept: CARDIOLOGY CLINIC | Age: 80
End: 2020-01-01

## 2020-01-01 VITALS
HEIGHT: 61 IN | BODY MASS INDEX: 18.5 KG/M2 | DIASTOLIC BLOOD PRESSURE: 72 MMHG | OXYGEN SATURATION: 94 % | TEMPERATURE: 98.2 F | HEART RATE: 65 BPM | SYSTOLIC BLOOD PRESSURE: 132 MMHG | WEIGHT: 98 LBS

## 2020-01-01 VITALS
SYSTOLIC BLOOD PRESSURE: 90 MMHG | RESPIRATION RATE: 24 BRPM | DIASTOLIC BLOOD PRESSURE: 43 MMHG | OXYGEN SATURATION: 100 %

## 2020-01-01 VITALS
SYSTOLIC BLOOD PRESSURE: 160 MMHG | WEIGHT: 100 LBS | OXYGEN SATURATION: 93 % | DIASTOLIC BLOOD PRESSURE: 72 MMHG | HEIGHT: 60 IN | BODY MASS INDEX: 19.63 KG/M2 | TEMPERATURE: 99.5 F | HEART RATE: 71 BPM | RESPIRATION RATE: 22 BRPM

## 2020-01-01 VITALS
BODY MASS INDEX: 20.71 KG/M2 | DIASTOLIC BLOOD PRESSURE: 70 MMHG | TEMPERATURE: 97.5 F | SYSTOLIC BLOOD PRESSURE: 180 MMHG | OXYGEN SATURATION: 97 % | HEART RATE: 72 BPM | RESPIRATION RATE: 18 BRPM | HEIGHT: 60 IN

## 2020-01-01 VITALS — HEIGHT: 63 IN | WEIGHT: 106.04 LBS | BODY MASS INDEX: 18.79 KG/M2

## 2020-01-01 VITALS
HEIGHT: 60 IN | TEMPERATURE: 98.4 F | BODY MASS INDEX: 26.88 KG/M2 | SYSTOLIC BLOOD PRESSURE: 138 MMHG | DIASTOLIC BLOOD PRESSURE: 59 MMHG | RESPIRATION RATE: 20 BRPM | HEART RATE: 61 BPM | OXYGEN SATURATION: 99 % | WEIGHT: 136.91 LBS

## 2020-01-01 VITALS
SYSTOLIC BLOOD PRESSURE: 110 MMHG | BODY MASS INDEX: 24.49 KG/M2 | RESPIRATION RATE: 22 BRPM | DIASTOLIC BLOOD PRESSURE: 64 MMHG | HEIGHT: 59 IN | OXYGEN SATURATION: 100 % | TEMPERATURE: 98 F | HEART RATE: 62 BPM | WEIGHT: 121.47 LBS

## 2020-01-01 VITALS
DIASTOLIC BLOOD PRESSURE: 80 MMHG | BODY MASS INDEX: 18.78 KG/M2 | HEART RATE: 73 BPM | WEIGHT: 106 LBS | TEMPERATURE: 99 F | SYSTOLIC BLOOD PRESSURE: 131 MMHG | HEIGHT: 63 IN

## 2020-01-01 VITALS — TEMPERATURE: 94.6 F | OXYGEN SATURATION: 86 % | DIASTOLIC BLOOD PRESSURE: 62 MMHG | SYSTOLIC BLOOD PRESSURE: 128 MMHG

## 2020-01-01 LAB
-: ABNORMAL
ABO/RH: NORMAL
ABO/RH: NORMAL
ABSOLUTE EOS #: 0 K/UL (ref 0–0.4)
ABSOLUTE EOS #: 0 K/UL (ref 0–0.44)
ABSOLUTE EOS #: 0 K/UL (ref 0–0.44)
ABSOLUTE EOS #: 0.08 K/UL (ref 0–0.44)
ABSOLUTE EOS #: <0.03 K/UL (ref 0–0.44)
ABSOLUTE IMMATURE GRANULOCYTE: 0 K/UL (ref 0–0.3)
ABSOLUTE IMMATURE GRANULOCYTE: 0.03 K/UL (ref 0–0.3)
ABSOLUTE IMMATURE GRANULOCYTE: 0.03 K/UL (ref 0–0.3)
ABSOLUTE IMMATURE GRANULOCYTE: 0.05 K/UL (ref 0–0.3)
ABSOLUTE IMMATURE GRANULOCYTE: 0.06 K/UL (ref 0–0.3)
ABSOLUTE LYMPH #: 0.29 K/UL (ref 1–4.8)
ABSOLUTE LYMPH #: 0.33 K/UL (ref 1.1–3.7)
ABSOLUTE LYMPH #: 0.35 K/UL (ref 1.1–3.7)
ABSOLUTE LYMPH #: 0.54 K/UL (ref 1–4.8)
ABSOLUTE LYMPH #: 0.62 K/UL (ref 1–4.8)
ABSOLUTE LYMPH #: 0.64 K/UL (ref 1.1–3.7)
ABSOLUTE LYMPH #: 0.71 K/UL (ref 1.1–3.7)
ABSOLUTE LYMPH #: 0.76 K/UL (ref 1–4.8)
ABSOLUTE MONO #: 0.04 K/UL (ref 0.1–0.8)
ABSOLUTE MONO #: 0.1 K/UL (ref 0.1–1.2)
ABSOLUTE MONO #: 0.11 K/UL (ref 0.1–0.8)
ABSOLUTE MONO #: 0.19 K/UL (ref 0.1–0.8)
ABSOLUTE MONO #: 0.21 K/UL (ref 0.1–1.2)
ABSOLUTE MONO #: 0.26 K/UL (ref 0.1–1.2)
ABSOLUTE MONO #: 0.39 K/UL (ref 0.1–0.8)
ABSOLUTE MONO #: 0.52 K/UL (ref 0.1–1.2)
ACTIVATED CLOTTING TIME: 255 SEC (ref 79–149)
ACTIVATED CLOTTING TIME: 319 SEC (ref 79–149)
ALBUMIN SERPL-MCNC: 2.4 G/DL (ref 3.5–5.2)
ALLEN TEST: ABNORMAL
ALLEN TEST: ABNORMAL
AMORPHOUS: ABNORMAL
ANION GAP SERPL CALCULATED.3IONS-SCNC: 10 MMOL/L (ref 9–17)
ANION GAP SERPL CALCULATED.3IONS-SCNC: 11 MMOL/L (ref 9–17)
ANION GAP SERPL CALCULATED.3IONS-SCNC: 11.2 MMOL/L
ANION GAP SERPL CALCULATED.3IONS-SCNC: 12 MMOL/L (ref 9–17)
ANION GAP SERPL CALCULATED.3IONS-SCNC: 13 MMOL/L (ref 9–17)
ANION GAP SERPL CALCULATED.3IONS-SCNC: 17 MMOL/L (ref 9–17)
ANION GAP SERPL CALCULATED.3IONS-SCNC: 6 MMOL/L (ref 9–17)
ANION GAP SERPL CALCULATED.3IONS-SCNC: 8 MMOL/L (ref 9–17)
ANION GAP SERPL CALCULATED.3IONS-SCNC: 8 MMOL/L (ref 9–17)
ANION GAP: 9 MMOL/L (ref 7–16)
ANTIBODY SCREEN: NEGATIVE
ANTIBODY SCREEN: NEGATIVE
APPEARANCE FLUID: NORMAL
ARM BAND NUMBER: NORMAL
ARM BAND NUMBER: NORMAL
BACTERIA: ABNORMAL
BASO FLUID: NORMAL %
BASOPHILS # BLD: 0 % (ref 0–2)
BASOPHILS # BLD: 1 % (ref 0–2)
BASOPHILS ABSOLUTE: 0 K/UL (ref 0–0.2)
BASOPHILS ABSOLUTE: 0.03 K/UL (ref 0–0.2)
BASOPHILS ABSOLUTE: <0.03 K/UL (ref 0–0.2)
BILIRUB SERPL-MCNC: 0.3 MG/DL (ref 0.3–1.2)
BILIRUBIN URINE: NEGATIVE
BLD PROD TYP BPU: NORMAL
BLOOD BANK SPECIMEN: NORMAL
BNP INTERPRETATION: ABNORMAL
BNP INTERPRETATION: ABNORMAL
BUN BLDV-MCNC: 39 MG/DL (ref 8–23)
BUN BLDV-MCNC: 43 MG/DL (ref 8–23)
BUN BLDV-MCNC: 47 MG/DL (ref 8–23)
BUN BLDV-MCNC: 48 MG/DL (ref 8–23)
BUN BLDV-MCNC: 49 MG/DL (ref 8–23)
BUN BLDV-MCNC: 50 MG/DL (ref 8–23)
BUN BLDV-MCNC: 52 MG/DL (ref 8–23)
BUN BLDV-MCNC: 56 MG/DL (ref 8–23)
BUN BLDV-MCNC: 63 MG/DL (ref 8–23)
BUN BLDV-MCNC: 76 MG/DL (ref 8–23)
BUN BLDV-MCNC: 78 MG/DL (ref 7–17)
BUN/CREAT BLD: ABNORMAL (ref 9–20)
C-REACTIVE PROTEIN: 29.5 MG/L (ref 0–5)
CALCIUM SERPL-MCNC: 7.8 MG/DL (ref 8.4–10.2)
CALCIUM SERPL-MCNC: 7.8 MG/DL (ref 8.6–10.4)
CALCIUM SERPL-MCNC: 7.9 MG/DL (ref 8.6–10.4)
CALCIUM SERPL-MCNC: 8 MG/DL (ref 8.6–10.4)
CALCIUM SERPL-MCNC: 8.1 MG/DL (ref 8.6–10.4)
CALCIUM SERPL-MCNC: 8.2 MG/DL (ref 8.6–10.4)
CALCIUM SERPL-MCNC: 8.4 MG/DL (ref 8.6–10.4)
CARBOXYHEMOGLOBIN: 1 % (ref 0–5)
CASTS UA: ABNORMAL /LPF (ref 0–8)
CHLORIDE BLD-SCNC: 100 MMOL/L (ref 98–107)
CHLORIDE BLD-SCNC: 101 MMOL/L (ref 98–107)
CHLORIDE BLD-SCNC: 102 MMOL/L (ref 98–107)
CHLORIDE BLD-SCNC: 102 MMOL/L (ref 98–107)
CHLORIDE BLD-SCNC: 103 MMOL/L (ref 98–107)
CHLORIDE BLD-SCNC: 104 MMOL/L (ref 98–107)
CHLORIDE BLD-SCNC: 93 MMOL/L (ref 98–120)
CHLORIDE BLD-SCNC: 96 MMOL/L (ref 98–107)
CO2: 25 MMOL/L (ref 20–31)
CO2: 26 MMOL/L (ref 20–31)
CO2: 26 MMOL/L (ref 20–31)
CO2: 27 MMOL/L (ref 20–31)
CO2: 27 MMOL/L (ref 20–31)
CO2: 28 MMOL/L (ref 20–31)
CO2: 29 MMOL/L (ref 20–31)
CO2: 29 MMOL/L (ref 20–31)
CO2: 30 MMOL/L (ref 20–31)
CO2: 30 MMOL/L (ref 20–31)
CO2: 31 MMOL/L (ref 20–31)
CO2: 36 MMOL/L (ref 22–31)
COLOR FLUID: NORMAL
COLOR: YELLOW
COMMENT UA: ABNORMAL
CREAT SERPL-MCNC: 1.36 MG/DL (ref 0.5–0.9)
CREAT SERPL-MCNC: 1.5 MG/DL (ref 0.5–0.9)
CREAT SERPL-MCNC: 1.55 MG/DL (ref 0.5–0.9)
CREAT SERPL-MCNC: 1.55 MG/DL (ref 0.5–0.9)
CREAT SERPL-MCNC: 1.57 MG/DL (ref 0.5–0.9)
CREAT SERPL-MCNC: 1.6 MG/DL (ref 0.5–0.9)
CREAT SERPL-MCNC: 1.65 MG/DL (ref 0.5–0.9)
CREAT SERPL-MCNC: 1.67 MG/DL (ref 0.5–0.9)
CREAT SERPL-MCNC: 1.68 MG/DL (ref 0.5–0.9)
CREAT SERPL-MCNC: 1.68 MG/DL (ref 0.5–0.9)
CREAT SERPL-MCNC: 1.83 MG/DL (ref 0.5–0.9)
CREAT SERPL-MCNC: 1.91 MG/DL (ref 0.5–0.9)
CREAT SERPL-MCNC: 2.3 MG/DL (ref 0.5–1)
CROSSMATCH RESULT: NORMAL
CRYSTALS, FLUID: NEGATIVE
CRYSTALS, UA: ABNORMAL /HPF
CULTURE: ABNORMAL
CULTURE: ABNORMAL
CULTURE: NORMAL
CULTURE: NORMAL
DATE, STOOL #1: ABNORMAL
DATE, STOOL #2: ABNORMAL
DATE, STOOL #3: ABNORMAL
DIFFERENTIAL TYPE: ABNORMAL
DIRECT EXAM: ABNORMAL
DIRECT EXAM: ABNORMAL
DIRECT EXAM: NORMAL
DISPENSE STATUS BLOOD BANK: NORMAL
EKG ATRIAL RATE: 62 BPM
EKG ATRIAL RATE: 64 BPM
EKG ATRIAL RATE: 66 BPM
EKG ATRIAL RATE: 71 BPM
EKG ATRIAL RATE: 73 BPM
EKG ATRIAL RATE: 74 BPM
EKG P AXIS: 2 DEGREES
EKG P AXIS: 36 DEGREES
EKG P AXIS: 79 DEGREES
EKG P-R INTERVAL: 114 MS
EKG P-R INTERVAL: 132 MS
EKG P-R INTERVAL: 94 MS
EKG Q-T INTERVAL: 354 MS
EKG Q-T INTERVAL: 366 MS
EKG Q-T INTERVAL: 392 MS
EKG Q-T INTERVAL: 400 MS
EKG Q-T INTERVAL: 408 MS
EKG Q-T INTERVAL: 452 MS
EKG QRS DURATION: 116 MS
EKG QRS DURATION: 146 MS
EKG QRS DURATION: 78 MS
EKG QRS DURATION: 88 MS
EKG QRS DURATION: 94 MS
EKG QRS DURATION: 94 MS
EKG QTC CALCULATION (BAZETT): 412 MS
EKG QTC CALCULATION (BAZETT): 428 MS
EKG QTC CALCULATION (BAZETT): 435 MS
EKG QTC CALCULATION (BAZETT): 435 MS
EKG QTC CALCULATION (BAZETT): 443 MS
EKG QTC CALCULATION (BAZETT): 484 MS
EKG R AXIS: 1 DEGREES
EKG R AXIS: 15 DEGREES
EKG R AXIS: 22 DEGREES
EKG R AXIS: 48 DEGREES
EKG R AXIS: 66 DEGREES
EKG R AXIS: 67 DEGREES
EKG T AXIS: -163 DEGREES
EKG T AXIS: -175 DEGREES
EKG T AXIS: 129 DEGREES
EKG T AXIS: 166 DEGREES
EKG T AXIS: 180 DEGREES
EKG T AXIS: 75 DEGREES
EKG VENTRICULAR RATE: 64 BPM
EKG VENTRICULAR RATE: 69 BPM
EKG VENTRICULAR RATE: 71 BPM
EKG VENTRICULAR RATE: 74 BPM
EKG VENTRICULAR RATE: 85 BPM
EKG VENTRICULAR RATE: 88 BPM
EOSINOPHIL FLUID: NORMAL %
EOSINOPHILS RELATIVE PERCENT: 0 % (ref 1–4)
EOSINOPHILS RELATIVE PERCENT: 2 % (ref 1–4)
EPITHELIAL CELLS UA: ABNORMAL /HPF (ref 0–5)
EXPIRATION DATE: NORMAL
EXPIRATION DATE: NORMAL
FIO2: 50
FIO2: ABNORMAL
FLUID DIFF COMMENT: NORMAL
GFR AFRICAN AMERICAN: 31 ML/MIN
GFR AFRICAN AMERICAN: 32 ML/MIN
GFR AFRICAN AMERICAN: 36 ML/MIN
GFR AFRICAN AMERICAN: 38 ML/MIN
GFR AFRICAN AMERICAN: 39 ML/MIN
GFR AFRICAN AMERICAN: 41 ML/MIN
GFR AFRICAN AMERICAN: 46 ML/MIN
GFR CALCULATED: 21.7
GFR NON-AFRICAN AMERICAN: 21 ML/MIN
GFR NON-AFRICAN AMERICAN: 22 ML/MIN
GFR NON-AFRICAN AMERICAN: 25 ML/MIN
GFR NON-AFRICAN AMERICAN: 27 ML/MIN
GFR NON-AFRICAN AMERICAN: 29 ML/MIN
GFR NON-AFRICAN AMERICAN: 29 ML/MIN
GFR NON-AFRICAN AMERICAN: 30 ML/MIN
GFR NON-AFRICAN AMERICAN: 30 ML/MIN
GFR NON-AFRICAN AMERICAN: 31 ML/MIN
GFR NON-AFRICAN AMERICAN: 32 ML/MIN
GFR NON-AFRICAN AMERICAN: 34 ML/MIN
GFR NON-AFRICAN AMERICAN: 38 ML/MIN
GFR SERPL CREATININE-BSD FRML MDRD: 26 ML/MIN
GFR SERPL CREATININE-BSD FRML MDRD: 27 ML/MIN
GFR SERPL CREATININE-BSD FRML MDRD: ABNORMAL ML/MIN/{1.73_M2}
GLUCOSE BLD-MCNC: 101 MG/DL (ref 70–99)
GLUCOSE BLD-MCNC: 102 MG/DL (ref 70–99)
GLUCOSE BLD-MCNC: 119 MG/DL (ref 70–99)
GLUCOSE BLD-MCNC: 125 MG/DL (ref 70–99)
GLUCOSE BLD-MCNC: 66 MG/DL (ref 70–99)
GLUCOSE BLD-MCNC: 75 MG/DL (ref 74–100)
GLUCOSE BLD-MCNC: 76 MG/DL (ref 70–99)
GLUCOSE BLD-MCNC: 80 MG/DL (ref 70–99)
GLUCOSE BLD-MCNC: 83 MG/DL (ref 70–99)
GLUCOSE BLD-MCNC: 85 MG/DL (ref 70–99)
GLUCOSE BLD-MCNC: 88 MG/DL (ref 70–99)
GLUCOSE BLD-MCNC: 93 MG/DL (ref 70–99)
GLUCOSE BLD-MCNC: 94 MG/DL (ref 74–100)
GLUCOSE BLD-MCNC: 95 MG/DL (ref 70–99)
GLUCOSE URINE: NEGATIVE
GLUCOSE: 90 MG/DL (ref 65–105)
HCO3 VENOUS: 31.6 MMOL/L (ref 24–30)
HCT VFR BLD CALC: 20.5 % (ref 36.3–47.1)
HCT VFR BLD CALC: 21.9 % (ref 36.3–47.1)
HCT VFR BLD CALC: 22.1 % (ref 36.3–47.1)
HCT VFR BLD CALC: 23.8 % (ref 36.3–47.1)
HCT VFR BLD CALC: 25.8 % (ref 36.3–47.1)
HCT VFR BLD CALC: 26.5 % (ref 36.3–47.1)
HCT VFR BLD CALC: 27.5 % (ref 36.3–47.1)
HCT VFR BLD CALC: 27.8 % (ref 36.3–47.1)
HCT VFR BLD CALC: 27.9 % (ref 36.3–47.1)
HCT VFR BLD CALC: 28.3 % (ref 36.3–47.1)
HCT VFR BLD CALC: 29.3 % (ref 36.3–47.1)
HCT VFR BLD CALC: 29.3 % (ref 36.3–47.1)
HCT VFR BLD CALC: 29.4 % (ref 36.3–47.1)
HCT VFR BLD CALC: 30.6 % (ref 36.3–47.1)
HCT VFR BLD CALC: 30.9 % (ref 36.3–47.1)
HCT VFR BLD CALC: 31.7 % (ref 36.3–47.1)
HCT VFR BLD CALC: 31.9 % (ref 36.3–47.1)
HCT VFR BLD CALC: 31.9 % (ref 36.3–47.1)
HCT VFR BLD CALC: 32.5 % (ref 36.3–47.1)
HCT VFR BLD CALC: 32.7 % (ref 36.3–47.1)
HCT VFR BLD CALC: 32.8 % (ref 36.3–47.1)
HCT VFR BLD CALC: 33.5 % (ref 36.3–47.1)
HCT VFR BLD CALC: 33.5 % (ref 36.3–47.1)
HCT VFR BLD CALC: 33.7 % (ref 36.3–47.1)
HCT VFR BLD CALC: 35.3 % (ref 36.3–47.1)
HEMOCCULT SP1 STL QL: POSITIVE
HEMOCCULT SP2 STL QL: ABNORMAL
HEMOCCULT SP3 STL QL: ABNORMAL
HEMOGLOBIN: 10 G/DL (ref 11.9–15.1)
HEMOGLOBIN: 10 G/DL (ref 11.9–15.1)
HEMOGLOBIN: 10.1 G/DL (ref 11.9–15.1)
HEMOGLOBIN: 10.1 G/DL (ref 11.9–15.1)
HEMOGLOBIN: 10.7 G/DL (ref 11.9–15.1)
HEMOGLOBIN: 10.9 G/DL (ref 11.9–15.1)
HEMOGLOBIN: 6.1 G/DL (ref 11.9–15.1)
HEMOGLOBIN: 6.4 G/DL (ref 11.9–15.1)
HEMOGLOBIN: 6.6 G/DL (ref 11.9–15.1)
HEMOGLOBIN: 7.2 G/DL (ref 11.9–15.1)
HEMOGLOBIN: 7.7 G/DL (ref 11.9–15.1)
HEMOGLOBIN: 7.9 G/DL (ref 11.9–15.1)
HEMOGLOBIN: 8.3 G/DL (ref 11.9–15.1)
HEMOGLOBIN: 8.4 G/DL (ref 11.9–15.1)
HEMOGLOBIN: 8.4 G/DL (ref 11.9–15.1)
HEMOGLOBIN: 8.8 G/DL (ref 11.9–15.1)
HEMOGLOBIN: 8.9 G/DL (ref 11.9–15.1)
HEMOGLOBIN: 8.9 G/DL (ref 11.9–15.1)
HEMOGLOBIN: 9.4 G/DL (ref 11.9–15.1)
HEMOGLOBIN: 9.5 G/DL (ref 11.9–15.1)
HEMOGLOBIN: 9.6 G/DL (ref 11.9–15.1)
HEMOGLOBIN: 9.6 G/DL (ref 11.9–15.1)
HEMOGLOBIN: 9.7 G/DL (ref 11.9–15.1)
HEMOGLOBIN: 9.7 G/DL (ref 11.9–15.1)
HEMOGLOBIN: 9.8 G/DL (ref 11.9–15.1)
IMMATURE GRANULOCYTES: 0 %
IMMATURE GRANULOCYTES: 1 %
IMMATURE GRANULOCYTES: 2 %
INR BLD: 1.2
INR BLD: 1.4
INR BLD: 1.5
INR BLD: 1.5
INR BLD: 1.6
INR BLD: 1.6
INR BLD: 1.8
INR BLD: 2.4
INR BLD: 3.7
INR BLD: 4
INR BLD: 5
KETONES, URINE: NEGATIVE
LEUKOCYTE ESTERASE, URINE: ABNORMAL
LV EF: 55 %
LV EF: 55 %
LVEF MODALITY: NORMAL
LVEF MODALITY: NORMAL
LYMPHOCYTES # BLD: 10 % (ref 24–43)
LYMPHOCYTES # BLD: 11 % (ref 24–43)
LYMPHOCYTES # BLD: 11 % (ref 24–44)
LYMPHOCYTES # BLD: 15 % (ref 24–44)
LYMPHOCYTES # BLD: 15 % (ref 24–44)
LYMPHOCYTES # BLD: 20 % (ref 24–44)
LYMPHOCYTES # BLD: 21 % (ref 24–43)
LYMPHOCYTES # BLD: 9 % (ref 24–43)
LYMPHOCYTES, BODY FLUID: 10 %
Lab: ABNORMAL
Lab: ABNORMAL
Lab: NORMAL
MAGNESIUM: 1.8 MG/DL (ref 1.6–2.6)
MAGNESIUM: 1.9 MG/DL (ref 1.6–2.6)
MAGNESIUM: 1.9 MG/DL (ref 1.6–2.6)
MAGNESIUM: 2 MG/DL (ref 1.6–2.6)
MAGNESIUM: 2.3 MG/DL (ref 1.6–2.6)
MCH RBC QN AUTO: 26.6 PG (ref 25.2–33.5)
MCH RBC QN AUTO: 26.9 PG (ref 25.2–33.5)
MCH RBC QN AUTO: 27.5 PG (ref 25.2–33.5)
MCH RBC QN AUTO: 27.8 PG (ref 25.2–33.5)
MCH RBC QN AUTO: 29.1 PG (ref 25.2–33.5)
MCH RBC QN AUTO: 29.2 PG (ref 25.2–33.5)
MCH RBC QN AUTO: 29.6 PG (ref 25.2–33.5)
MCH RBC QN AUTO: 29.6 PG (ref 25.2–33.5)
MCH RBC QN AUTO: 29.7 PG (ref 25.2–33.5)
MCH RBC QN AUTO: 30.2 PG (ref 25.2–33.5)
MCH RBC QN AUTO: 30.2 PG (ref 25.2–33.5)
MCH RBC QN AUTO: 30.4 PG (ref 25.2–33.5)
MCHC RBC AUTO-ENTMCNC: 29.1 G/DL (ref 28.4–34.8)
MCHC RBC AUTO-ENTMCNC: 29.2 G/DL (ref 28.4–34.8)
MCHC RBC AUTO-ENTMCNC: 29.7 G/DL (ref 28.4–34.8)
MCHC RBC AUTO-ENTMCNC: 29.7 G/DL (ref 28.4–34.8)
MCHC RBC AUTO-ENTMCNC: 30 G/DL (ref 28.4–34.8)
MCHC RBC AUTO-ENTMCNC: 30.1 G/DL (ref 28.4–34.8)
MCHC RBC AUTO-ENTMCNC: 30.1 G/DL (ref 28.4–34.8)
MCHC RBC AUTO-ENTMCNC: 30.2 G/DL (ref 28.4–34.8)
MCHC RBC AUTO-ENTMCNC: 30.4 G/DL (ref 28.4–34.8)
MCHC RBC AUTO-ENTMCNC: 30.5 G/DL (ref 28.4–34.8)
MCHC RBC AUTO-ENTMCNC: 30.6 G/DL (ref 28.4–34.8)
MCHC RBC AUTO-ENTMCNC: 31.9 G/DL (ref 28.4–34.8)
MCV RBC AUTO: 100 FL (ref 82.6–102.9)
MCV RBC AUTO: 100 FL (ref 82.6–102.9)
MCV RBC AUTO: 100.3 FL (ref 82.6–102.9)
MCV RBC AUTO: 100.6 FL (ref 82.6–102.9)
MCV RBC AUTO: 87.2 FL (ref 82.6–102.9)
MCV RBC AUTO: 88.5 FL (ref 82.6–102.9)
MCV RBC AUTO: 91.7 FL (ref 82.6–102.9)
MCV RBC AUTO: 92.5 FL (ref 82.6–102.9)
MCV RBC AUTO: 96.2 FL (ref 82.6–102.9)
MCV RBC AUTO: 96.6 FL (ref 82.6–102.9)
MCV RBC AUTO: 98.5 FL (ref 82.6–102.9)
MCV RBC AUTO: 99.7 FL (ref 82.6–102.9)
METHEMOGLOBIN: ABNORMAL % (ref 0–1.5)
MODE: ABNORMAL
MODE: ABNORMAL
MONOCYTE, FLUID: NORMAL %
MONOCYTES # BLD: 2 % (ref 1–7)
MONOCYTES # BLD: 3 % (ref 1–7)
MONOCYTES # BLD: 3 % (ref 3–12)
MONOCYTES # BLD: 5 % (ref 1–7)
MONOCYTES # BLD: 6 % (ref 3–12)
MONOCYTES # BLD: 7 % (ref 1–7)
MONOCYTES # BLD: 8 % (ref 3–12)
MONOCYTES # BLD: 9 % (ref 3–12)
MORPHOLOGY: ABNORMAL
MUCUS: ABNORMAL
NEGATIVE BASE EXCESS, ART: ABNORMAL (ref 0–2)
NEGATIVE BASE EXCESS, VEN: ABNORMAL MMOL/L (ref 0–2)
NEUTROPHIL, FLUID: 18 %
NITRITE, URINE: POSITIVE
NOTIFICATION TIME: ABNORMAL
NOTIFICATION: ABNORMAL
NRBC AUTOMATED: 0 PER 100 WBC
O2 DEVICE/FLOW/%: ABNORMAL
O2 DEVICE/FLOW/%: ABNORMAL
O2 SAT, VEN: 50.5 % (ref 60–85)
OTHER CELLS FLUID: NORMAL %
OTHER OBSERVATIONS UA: ABNORMAL
OXYHEMOGLOBIN: ABNORMAL % (ref 95–98)
PATIENT TEMP: 37
PATIENT TEMP: ABNORMAL
PCO2, VEN, TEMP ADJ: ABNORMAL MMHG (ref 39–55)
PCO2, VEN: 58.7 (ref 39–55)
PDW BLD-RTO: 17.2 % (ref 11.8–14.4)
PDW BLD-RTO: 17.2 % (ref 11.8–14.4)
PDW BLD-RTO: 17.4 % (ref 11.8–14.4)
PDW BLD-RTO: 17.6 % (ref 11.8–14.4)
PDW BLD-RTO: 17.7 % (ref 11.8–14.4)
PDW BLD-RTO: 17.7 % (ref 11.8–14.4)
PDW BLD-RTO: 18.3 % (ref 11.8–14.4)
PDW BLD-RTO: 18.4 % (ref 11.8–14.4)
PDW BLD-RTO: 19.8 % (ref 11.8–14.4)
PDW BLD-RTO: 19.9 % (ref 11.8–14.4)
PDW BLD-RTO: 20 % (ref 11.8–14.4)
PDW BLD-RTO: 20.7 % (ref 11.8–14.4)
PEEP/CPAP: ABNORMAL
PH UA: 6.5 (ref 5–8)
PH VENOUS: 7.35 (ref 7.32–7.42)
PH, VEN, TEMP ADJ: ABNORMAL (ref 7.32–7.42)
PLATELET # BLD: 112 K/UL (ref 138–453)
PLATELET # BLD: 113 K/UL (ref 138–453)
PLATELET # BLD: 120 K/UL (ref 138–453)
PLATELET # BLD: 143 K/UL (ref 138–453)
PLATELET # BLD: 146 K/UL (ref 138–453)
PLATELET # BLD: 162 K/UL (ref 138–453)
PLATELET # BLD: 170 K/UL (ref 138–453)
PLATELET # BLD: 173 K/UL (ref 138–453)
PLATELET # BLD: ABNORMAL K/UL (ref 138–453)
PLATELET ESTIMATE: ABNORMAL
PLATELET, FLUORESCENCE: 114 K/UL (ref 138–453)
PLATELET, FLUORESCENCE: 117 K/UL (ref 138–453)
PLATELET, FLUORESCENCE: 96 K/UL (ref 138–453)
PLATELET, FLUORESCENCE: 98 K/UL (ref 138–453)
PLATELET, IMMATURE FRACTION: 4.5 % (ref 1.1–10.3)
PLATELET, IMMATURE FRACTION: 4.6 % (ref 1.1–10.3)
PLATELET, IMMATURE FRACTION: 4.8 % (ref 1.1–10.3)
PLATELET, IMMATURE FRACTION: 5.1 % (ref 1.1–10.3)
PMV BLD AUTO: 11.5 FL (ref 8.1–13.5)
PMV BLD AUTO: 11.6 FL (ref 8.1–13.5)
PMV BLD AUTO: 11.6 FL (ref 8.1–13.5)
PMV BLD AUTO: 11.9 FL (ref 8.1–13.5)
PMV BLD AUTO: 12.1 FL (ref 8.1–13.5)
PMV BLD AUTO: 12.1 FL (ref 8.1–13.5)
PMV BLD AUTO: 12.2 FL (ref 8.1–13.5)
PMV BLD AUTO: 12.7 FL (ref 8.1–13.5)
PMV BLD AUTO: ABNORMAL FL (ref 8.1–13.5)
PO2, VEN, TEMP ADJ: ABNORMAL MMHG (ref 30–50)
PO2, VEN: 30.6 (ref 30–50)
POC CHLORIDE: 102 MMOL/L (ref 98–107)
POC CHLORIDE: 102 MMOL/L (ref 98–107)
POC CREATININE: 2.14 MG/DL (ref 0.51–1.19)
POC CREATININE: 2.24 MG/DL (ref 0.51–1.19)
POC HCO3: 30.7 MMOL/L (ref 21–28)
POC HEMATOCRIT: 24 % (ref 36–46)
POC HEMATOCRIT: 27 % (ref 36–46)
POC HEMOGLOBIN: 8 G/DL (ref 12–16)
POC HEMOGLOBIN: 9.1 G/DL (ref 12–16)
POC IONIZED CALCIUM: 1.12 MMOL/L (ref 1.15–1.33)
POC LACTIC ACID: 0.6 MMOL/L (ref 0.56–1.39)
POC O2 SATURATION: 97 % (ref 94–98)
POC PCO2 TEMP: ABNORMAL MM HG
POC PCO2: 42.9 MM HG (ref 35–48)
POC PH TEMP: ABNORMAL
POC PH: 7.46 (ref 7.35–7.45)
POC PO2 TEMP: ABNORMAL MM HG
POC PO2: 89.2 MM HG (ref 83–108)
POC POTASSIUM: 3.4 MMOL/L (ref 3.5–4.5)
POC POTASSIUM: 3.9 MMOL/L (ref 3.5–4.5)
POC SODIUM: 142 MMOL/L (ref 138–146)
POC SODIUM: 144 MMOL/L (ref 138–146)
POSITIVE BASE EXCESS, ART: 6 (ref 0–3)
POSITIVE BASE EXCESS, VEN: 5.4 MMOL/L (ref 0–2)
POTASSIUM SERPL-SCNC: 3.7 MMOL/L (ref 3.7–5.3)
POTASSIUM SERPL-SCNC: 3.8 MMOL/L (ref 3.7–5.3)
POTASSIUM SERPL-SCNC: 3.8 MMOL/L (ref 3.7–5.3)
POTASSIUM SERPL-SCNC: 3.9 MMOL/L (ref 3.7–5.3)
POTASSIUM SERPL-SCNC: 3.9 MMOL/L (ref 3.7–5.3)
POTASSIUM SERPL-SCNC: 4 MMOL/L (ref 3.7–5.3)
POTASSIUM SERPL-SCNC: 4.1 MMOL/L (ref 3.7–5.3)
POTASSIUM SERPL-SCNC: 4.2 MMOL/L (ref 3.6–5)
POTASSIUM SERPL-SCNC: 4.2 MMOL/L (ref 3.7–5.3)
POTASSIUM SERPL-SCNC: 4.3 MMOL/L (ref 3.7–5.3)
POTASSIUM SERPL-SCNC: 4.6 MMOL/L (ref 3.7–5.3)
PRO-BNP: ABNORMAL PG/ML
PRO-BNP: ABNORMAL PG/ML
PROTEIN UA: NEGATIVE
PROTHROMBIN TIME: 12.5 SEC (ref 9–12)
PROTHROMBIN TIME: 14.7 SEC (ref 9–12)
PROTHROMBIN TIME: 15.4 SEC (ref 9–12)
PROTHROMBIN TIME: 15.7 SEC (ref 9–12)
PROTHROMBIN TIME: 16.1 SEC (ref 9–12)
PROTHROMBIN TIME: 16.7 SEC (ref 9–12)
PROTHROMBIN TIME: 18.5 SEC (ref 9–12)
PROTHROMBIN TIME: 24.3 SEC (ref 9–12)
PROTHROMBIN TIME: 37.2 SEC (ref 9–12)
PROTHROMBIN TIME: 39.6 SEC (ref 9–12)
PROTHROMBIN TIME: 49.1 SEC (ref 9–12)
PSV: ABNORMAL
PT. POSITION: ABNORMAL
RBC # BLD: 2.19 M/UL (ref 3.95–5.11)
RBC # BLD: 2.67 M/UL (ref 3.95–5.11)
RBC # BLD: 2.89 M/UL (ref 3.95–5.11)
RBC # BLD: 2.89 M/UL (ref 3.95–5.11)
RBC # BLD: 3.06 M/UL (ref 3.95–5.11)
RBC # BLD: 3.17 M/UL (ref 3.95–5.11)
RBC # BLD: 3.2 M/UL (ref 3.95–5.11)
RBC # BLD: 3.24 M/UL (ref 3.95–5.11)
RBC # BLD: 3.25 M/UL (ref 3.95–5.11)
RBC # BLD: 3.29 M/UL (ref 3.95–5.11)
RBC # BLD: 3.31 M/UL (ref 3.95–5.11)
RBC # BLD: 3.34 M/UL (ref 3.95–5.11)
RBC # BLD: ABNORMAL 10*6/UL
RBC FLUID: NORMAL /MM3
RBC UA: ABNORMAL /HPF (ref 0–4)
RENAL EPITHELIAL, UA: ABNORMAL /HPF
RESPIRATORY RATE: ABNORMAL
SAMPLE SITE: ABNORMAL
SAMPLE SITE: ABNORMAL
SARS-COV-2, PCR: NORMAL
SARS-COV-2, RAPID: NORMAL
SARS-COV-2, RAPID: NORMAL
SARS-COV-2, RAPID: NOT DETECTED
SARS-COV-2: NORMAL
SARS-COV-2: NOT DETECTED
SARS-COV-2: NOT DETECTED
SEG NEUTROPHILS: 66 % (ref 36–65)
SEG NEUTROPHILS: 75 % (ref 36–66)
SEG NEUTROPHILS: 79 % (ref 36–65)
SEG NEUTROPHILS: 82 % (ref 36–66)
SEG NEUTROPHILS: 82 % (ref 36–66)
SEG NEUTROPHILS: 83 % (ref 36–66)
SEG NEUTROPHILS: 84 % (ref 36–65)
SEG NEUTROPHILS: 86 % (ref 36–65)
SEGMENTED NEUTROPHILS ABSOLUTE COUNT: 1.57 K/UL (ref 1.8–7.7)
SEGMENTED NEUTROPHILS ABSOLUTE COUNT: 2.19 K/UL (ref 1.5–8.1)
SEGMENTED NEUTROPHILS ABSOLUTE COUNT: 2.84 K/UL (ref 1.5–8.1)
SEGMENTED NEUTROPHILS ABSOLUTE COUNT: 2.85 K/UL (ref 1.8–7.7)
SEGMENTED NEUTROPHILS ABSOLUTE COUNT: 2.95 K/UL (ref 1.8–7.7)
SEGMENTED NEUTROPHILS ABSOLUTE COUNT: 3.18 K/UL (ref 1.5–8.1)
SEGMENTED NEUTROPHILS ABSOLUTE COUNT: 4.58 K/UL (ref 1.5–8.1)
SEGMENTED NEUTROPHILS ABSOLUTE COUNT: 4.59 K/UL (ref 1.8–7.7)
SET RATE: ABNORMAL
SODIUM BLD-SCNC: 136 MMOL/L (ref 135–145)
SODIUM BLD-SCNC: 137 MMOL/L (ref 135–144)
SODIUM BLD-SCNC: 138 MMOL/L (ref 135–144)
SODIUM BLD-SCNC: 138 MMOL/L (ref 135–144)
SODIUM BLD-SCNC: 139 MMOL/L (ref 135–144)
SODIUM BLD-SCNC: 140 MMOL/L (ref 135–144)
SODIUM BLD-SCNC: 141 MMOL/L (ref 135–144)
SODIUM BLD-SCNC: 142 MMOL/L (ref 135–144)
SODIUM BLD-SCNC: 143 MMOL/L (ref 135–144)
SOURCE: NORMAL
SPECIFIC GRAVITY UA: 1.01 (ref 1–1.03)
SPECIMEN DESCRIPTION: ABNORMAL
SPECIMEN DESCRIPTION: ABNORMAL
SPECIMEN DESCRIPTION: NORMAL
SPECIMEN TYPE: NORMAL
SPECIMEN TYPE: NORMAL
TCO2 (CALC), ART: 32 MMOL/L (ref 22–29)
TEXT FOR RESPIRATORY: ABNORMAL
TIME, STOOL #1: ABNORMAL
TIME, STOOL #2: ABNORMAL
TIME, STOOL #3: ABNORMAL
TOTAL HB: ABNORMAL G/DL (ref 12–16)
TOTAL RATE: ABNORMAL
TRANSFUSION STATUS: NORMAL
TRICHOMONAS: ABNORMAL
TROPONIN INTERP: ABNORMAL
TROPONIN T: ABNORMAL NG/ML
TROPONIN, HIGH SENSITIVITY: 103 NG/L (ref 0–14)
TROPONIN, HIGH SENSITIVITY: 315 NG/L (ref 0–14)
TROPONIN, HIGH SENSITIVITY: 421 NG/L (ref 0–14)
TROPONIN, HIGH SENSITIVITY: 61 NG/L (ref 0–14)
TROPONIN, HIGH SENSITIVITY: 64 NG/L (ref 0–14)
TROPONIN, HIGH SENSITIVITY: 67 NG/L (ref 0–14)
TROPONIN, HIGH SENSITIVITY: 72 NG/L (ref 0–14)
TROPONIN, HIGH SENSITIVITY: 78 NG/L (ref 0–14)
TROPONIN, HIGH SENSITIVITY: 80 NG/L (ref 0–14)
TROPONIN, HIGH SENSITIVITY: 83 NG/L (ref 0–14)
TROPONIN, HIGH SENSITIVITY: 86 NG/L (ref 0–14)
TROPONIN, HIGH SENSITIVITY: 87 NG/L (ref 0–14)
TROPONIN, HIGH SENSITIVITY: 88 NG/L (ref 0–14)
TROPONIN, HIGH SENSITIVITY: 88 NG/L (ref 0–14)
TROPONIN, HIGH SENSITIVITY: 92 NG/L (ref 0–14)
TROPONIN, HIGH SENSITIVITY: 96 NG/L (ref 0–14)
TROPONIN, HIGH SENSITIVITY: 97 NG/L (ref 0–14)
TURBIDITY: CLEAR
UNIT DIVISION: 0
UNIT NUMBER: NORMAL
URIC ACID: 13.1 MG/DL (ref 2.4–5.7)
URINE HGB: NEGATIVE
UROBILINOGEN, URINE: NORMAL
VT: ABNORMAL
WBC # BLD: 1.9 K/UL (ref 3.5–11.3)
WBC # BLD: 3.3 K/UL (ref 3.5–11.3)
WBC # BLD: 3.3 K/UL (ref 3.5–11.3)
WBC # BLD: 3.6 K/UL (ref 3.5–11.3)
WBC # BLD: 3.6 K/UL (ref 3.5–11.3)
WBC # BLD: 3.8 K/UL (ref 3.5–11.3)
WBC # BLD: 3.8 K/UL (ref 3.5–11.3)
WBC # BLD: 5.3 K/UL (ref 3.5–11.3)
WBC # BLD: 5.6 K/UL (ref 3.5–11.3)
WBC # BLD: 5.7 K/UL (ref 3.5–11.3)
WBC # BLD: 5.8 K/UL (ref 3.5–11.3)
WBC # BLD: 6.3 K/UL (ref 3.5–11.3)
WBC # BLD: ABNORMAL 10*3/UL
WBC FLUID: 2896 /MM3
WBC UA: ABNORMAL /HPF (ref 0–5)
YEAST: ABNORMAL

## 2020-01-01 PROCEDURE — 2720000010 HC SURG SUPPLY STERILE

## 2020-01-01 PROCEDURE — 93010 ELECTROCARDIOGRAM REPORT: CPT | Performed by: INTERNAL MEDICINE

## 2020-01-01 PROCEDURE — 94640 AIRWAY INHALATION TREATMENT: CPT

## 2020-01-01 PROCEDURE — 84484 ASSAY OF TROPONIN QUANT: CPT

## 2020-01-01 PROCEDURE — 6370000000 HC RX 637 (ALT 250 FOR IP): Performed by: STUDENT IN AN ORGANIZED HEALTH CARE EDUCATION/TRAINING PROGRAM

## 2020-01-01 PROCEDURE — 3700000000 HC ANESTHESIA ATTENDED CARE

## 2020-01-01 PROCEDURE — 2000000000 HC ICU R&B

## 2020-01-01 PROCEDURE — 85610 PROTHROMBIN TIME: CPT

## 2020-01-01 PROCEDURE — 85018 HEMOGLOBIN: CPT

## 2020-01-01 PROCEDURE — 2580000003 HC RX 258: Performed by: NURSE PRACTITIONER

## 2020-01-01 PROCEDURE — 51702 INSERT TEMP BLADDER CATH: CPT | Performed by: UROLOGY

## 2020-01-01 PROCEDURE — 99233 SBSQ HOSP IP/OBS HIGH 50: CPT | Performed by: INTERNAL MEDICINE

## 2020-01-01 PROCEDURE — 82803 BLOOD GASES ANY COMBINATION: CPT

## 2020-01-01 PROCEDURE — 2580000003 HC RX 258: Performed by: STUDENT IN AN ORGANIZED HEALTH CARE EDUCATION/TRAINING PROGRAM

## 2020-01-01 PROCEDURE — 71045 X-RAY EXAM CHEST 1 VIEW: CPT

## 2020-01-01 PROCEDURE — 2500000003 HC RX 250 WO HCPCS: Performed by: NURSE ANESTHETIST, CERTIFIED REGISTERED

## 2020-01-01 PROCEDURE — 6370000000 HC RX 637 (ALT 250 FOR IP): Performed by: INTERNAL MEDICINE

## 2020-01-01 PROCEDURE — 36415 COLL VENOUS BLD VENIPUNCTURE: CPT

## 2020-01-01 PROCEDURE — 83735 ASSAY OF MAGNESIUM: CPT

## 2020-01-01 PROCEDURE — 80048 BASIC METABOLIC PNL TOTAL CA: CPT

## 2020-01-01 PROCEDURE — 85025 COMPLETE CBC W/AUTO DIFF WBC: CPT

## 2020-01-01 PROCEDURE — C9113 INJ PANTOPRAZOLE SODIUM, VIA: HCPCS | Performed by: STUDENT IN AN ORGANIZED HEALTH CARE EDUCATION/TRAINING PROGRAM

## 2020-01-01 PROCEDURE — 84550 ASSAY OF BLOOD/URIC ACID: CPT

## 2020-01-01 PROCEDURE — C1760 CLOSURE DEV, VASC: HCPCS

## 2020-01-01 PROCEDURE — 99291 CRITICAL CARE FIRST HOUR: CPT | Performed by: INTERNAL MEDICINE

## 2020-01-01 PROCEDURE — 84295 ASSAY OF SERUM SODIUM: CPT

## 2020-01-01 PROCEDURE — 3609008400 HC SIGMOIDOSCOPY DIAGNOSTIC: Performed by: SURGERY

## 2020-01-01 PROCEDURE — 93005 ELECTROCARDIOGRAM TRACING: CPT | Performed by: STUDENT IN AN ORGANIZED HEALTH CARE EDUCATION/TRAINING PROGRAM

## 2020-01-01 PROCEDURE — 84132 ASSAY OF SERUM POTASSIUM: CPT

## 2020-01-01 PROCEDURE — 6360000002 HC RX W HCPCS: Performed by: STUDENT IN AN ORGANIZED HEALTH CARE EDUCATION/TRAINING PROGRAM

## 2020-01-01 PROCEDURE — 7100000001 HC PACU RECOVERY - ADDTL 15 MIN

## 2020-01-01 PROCEDURE — 6360000002 HC RX W HCPCS

## 2020-01-01 PROCEDURE — 87088 URINE BACTERIA CULTURE: CPT

## 2020-01-01 PROCEDURE — 6360000002 HC RX W HCPCS: Performed by: NURSE ANESTHETIST, CERTIFIED REGISTERED

## 2020-01-01 PROCEDURE — 2709999900 HC NON-CHARGEABLE SUPPLY

## 2020-01-01 PROCEDURE — 2500000003 HC RX 250 WO HCPCS

## 2020-01-01 PROCEDURE — 99232 SBSQ HOSP IP/OBS MODERATE 35: CPT | Performed by: INTERNAL MEDICINE

## 2020-01-01 PROCEDURE — 6370000000 HC RX 637 (ALT 250 FOR IP): Performed by: NURSE PRACTITIONER

## 2020-01-01 PROCEDURE — APPSS30 APP SPLIT SHARED TIME 16-30 MINUTES: Performed by: INTERNAL MEDICINE

## 2020-01-01 PROCEDURE — 97162 PT EVAL MOD COMPLEX 30 MIN: CPT

## 2020-01-01 PROCEDURE — P9016 RBC LEUKOCYTES REDUCED: HCPCS

## 2020-01-01 PROCEDURE — 86850 RBC ANTIBODY SCREEN: CPT

## 2020-01-01 PROCEDURE — 83880 ASSAY OF NATRIURETIC PEPTIDE: CPT

## 2020-01-01 PROCEDURE — 3700000001 HC ADD 15 MINUTES (ANESTHESIA): Performed by: SURGERY

## 2020-01-01 PROCEDURE — 97530 THERAPEUTIC ACTIVITIES: CPT

## 2020-01-01 PROCEDURE — U0003 INFECTIOUS AGENT DETECTION BY NUCLEIC ACID (DNA OR RNA); SEVERE ACUTE RESPIRATORY SYNDROME CORONAVIRUS 2 (SARS-COV-2) (CORONAVIRUS DISEASE [COVID-19]), AMPLIFIED PROBE TECHNIQUE, MAKING USE OF HIGH THROUGHPUT TECHNOLOGIES AS DESCRIBED BY CMS-2020-01-R: HCPCS

## 2020-01-01 PROCEDURE — 99222 1ST HOSP IP/OBS MODERATE 55: CPT | Performed by: INTERNAL MEDICINE

## 2020-01-01 PROCEDURE — 3700000001 HC ADD 15 MINUTES (ANESTHESIA)

## 2020-01-01 PROCEDURE — 99285 EMERGENCY DEPT VISIT HI MDM: CPT

## 2020-01-01 PROCEDURE — 2700000000 HC OXYGEN THERAPY PER DAY

## 2020-01-01 PROCEDURE — 99239 HOSP IP/OBS DSCHRG MGMT >30: CPT | Performed by: INTERNAL MEDICINE

## 2020-01-01 PROCEDURE — 2060000000 HC ICU INTERMEDIATE R&B

## 2020-01-01 PROCEDURE — 85014 HEMATOCRIT: CPT

## 2020-01-01 PROCEDURE — 86900 BLOOD TYPING SEROLOGIC ABO: CPT

## 2020-01-01 PROCEDURE — U0002 COVID-19 LAB TEST NON-CDC: HCPCS

## 2020-01-01 PROCEDURE — 94761 N-INVAS EAR/PLS OXIMETRY MLT: CPT

## 2020-01-01 PROCEDURE — 82947 ASSAY GLUCOSE BLOOD QUANT: CPT

## 2020-01-01 PROCEDURE — 6360000002 HC RX W HCPCS: Performed by: PHYSICIAN ASSISTANT

## 2020-01-01 PROCEDURE — B3101ZZ FLUOROSCOPY OF THORACIC AORTA USING LOW OSMOLAR CONTRAST: ICD-10-PCS | Performed by: INTERNAL MEDICINE

## 2020-01-01 PROCEDURE — 86140 C-REACTIVE PROTEIN: CPT

## 2020-01-01 PROCEDURE — C1769 GUIDE WIRE: HCPCS

## 2020-01-01 PROCEDURE — 99213 OFFICE O/P EST LOW 20 MIN: CPT | Performed by: UROLOGY

## 2020-01-01 PROCEDURE — 2580000003 HC RX 258: Performed by: PHYSICIAN ASSISTANT

## 2020-01-01 PROCEDURE — A4641 RADIOPHARM DX AGENT NOC: HCPCS | Performed by: STUDENT IN AN ORGANIZED HEALTH CARE EDUCATION/TRAINING PROGRAM

## 2020-01-01 PROCEDURE — 93005 ELECTROCARDIOGRAM TRACING: CPT | Performed by: NURSE PRACTITIONER

## 2020-01-01 PROCEDURE — 76376 3D RENDER W/INTRP POSTPROCES: CPT

## 2020-01-01 PROCEDURE — 6360000002 HC RX W HCPCS: Performed by: NURSE PRACTITIONER

## 2020-01-01 PROCEDURE — 36430 TRANSFUSION BLD/BLD COMPNT: CPT

## 2020-01-01 PROCEDURE — 97116 GAIT TRAINING THERAPY: CPT

## 2020-01-01 PROCEDURE — 2500000003 HC RX 250 WO HCPCS: Performed by: STUDENT IN AN ORGANIZED HEALTH CARE EDUCATION/TRAINING PROGRAM

## 2020-01-01 PROCEDURE — 99223 1ST HOSP IP/OBS HIGH 75: CPT | Performed by: INTERNAL MEDICINE

## 2020-01-01 PROCEDURE — 1200000000 HC SEMI PRIVATE

## 2020-01-01 PROCEDURE — 7100000011 HC PHASE II RECOVERY - ADDTL 15 MIN: Performed by: SURGERY

## 2020-01-01 PROCEDURE — 2580000003 HC RX 258: Performed by: INTERNAL MEDICINE

## 2020-01-01 PROCEDURE — 99221 1ST HOSP IP/OBS SF/LOW 40: CPT | Performed by: NURSE PRACTITIONER

## 2020-01-01 PROCEDURE — 81001 URINALYSIS AUTO W/SCOPE: CPT

## 2020-01-01 PROCEDURE — 80051 ELECTROLYTE PANEL: CPT

## 2020-01-01 PROCEDURE — 93320 DOPPLER ECHO COMPLETE: CPT

## 2020-01-01 PROCEDURE — 3700000000 HC ANESTHESIA ATTENDED CARE: Performed by: SURGERY

## 2020-01-01 PROCEDURE — 93306 TTE W/DOPPLER COMPLETE: CPT

## 2020-01-01 PROCEDURE — 76937 US GUIDE VASCULAR ACCESS: CPT

## 2020-01-01 PROCEDURE — 33361 REPLACE AORTIC VALVE PERQ: CPT | Performed by: INTERNAL MEDICINE

## 2020-01-01 PROCEDURE — 87075 CULTR BACTERIA EXCEPT BLOOD: CPT

## 2020-01-01 PROCEDURE — 82330 ASSAY OF CALCIUM: CPT

## 2020-01-01 PROCEDURE — 85055 RETICULATED PLATELET ASSAY: CPT

## 2020-01-01 PROCEDURE — 94664 DEMO&/EVAL PT USE INHALER: CPT

## 2020-01-01 PROCEDURE — 7100000000 HC PACU RECOVERY - FIRST 15 MIN

## 2020-01-01 PROCEDURE — 74270 X-RAY XM COLON 1CNTRST STD: CPT

## 2020-01-01 PROCEDURE — 82040 ASSAY OF SERUM ALBUMIN: CPT

## 2020-01-01 PROCEDURE — 93325 DOPPLER ECHO COLOR FLOW MAPG: CPT

## 2020-01-01 PROCEDURE — 89060 EXAM SYNOVIAL FLUID CRYSTALS: CPT

## 2020-01-01 PROCEDURE — 86901 BLOOD TYPING SEROLOGIC RH(D): CPT

## 2020-01-01 PROCEDURE — 87186 SC STD MICRODIL/AGAR DIL: CPT

## 2020-01-01 PROCEDURE — 87040 BLOOD CULTURE FOR BACTERIA: CPT

## 2020-01-01 PROCEDURE — 92611 MOTION FLUOROSCOPY/SWALLOW: CPT

## 2020-01-01 PROCEDURE — 6360000004 HC RX CONTRAST MEDICATION

## 2020-01-01 PROCEDURE — 82565 ASSAY OF CREATININE: CPT

## 2020-01-01 PROCEDURE — 87070 CULTURE OTHR SPECIMN AEROBIC: CPT

## 2020-01-01 PROCEDURE — 6360000004 HC RX CONTRAST MEDICATION: Performed by: STUDENT IN AN ORGANIZED HEALTH CARE EDUCATION/TRAINING PROGRAM

## 2020-01-01 PROCEDURE — 2580000003 HC RX 258: Performed by: NURSE ANESTHETIST, CERTIFIED REGISTERED

## 2020-01-01 PROCEDURE — 94660 CPAP INITIATION&MGMT: CPT

## 2020-01-01 PROCEDURE — 02RF38Z REPLACEMENT OF AORTIC VALVE WITH ZOOPLASTIC TISSUE, PERCUTANEOUS APPROACH: ICD-10-PCS | Performed by: INTERNAL MEDICINE

## 2020-01-01 PROCEDURE — 93312 ECHO TRANSESOPHAGEAL: CPT

## 2020-01-01 PROCEDURE — 93005 ELECTROCARDIOGRAM TRACING: CPT | Performed by: ANESTHESIOLOGY

## 2020-01-01 PROCEDURE — 7100000010 HC PHASE II RECOVERY - FIRST 15 MIN: Performed by: SURGERY

## 2020-01-01 PROCEDURE — 84520 ASSAY OF UREA NITROGEN: CPT

## 2020-01-01 PROCEDURE — 89051 BODY FLUID CELL COUNT: CPT

## 2020-01-01 PROCEDURE — 82272 OCCULT BLD FECES 1-3 TESTS: CPT

## 2020-01-01 PROCEDURE — 37799 UNLISTED PX VASCULAR SURGERY: CPT

## 2020-01-01 PROCEDURE — 85347 COAGULATION TIME ACTIVATED: CPT

## 2020-01-01 PROCEDURE — 97535 SELF CARE MNGMENT TRAINING: CPT

## 2020-01-01 PROCEDURE — 85027 COMPLETE CBC AUTOMATED: CPT

## 2020-01-01 PROCEDURE — 99203 OFFICE O/P NEW LOW 30 MIN: CPT | Performed by: SURGERY

## 2020-01-01 PROCEDURE — 86920 COMPATIBILITY TEST SPIN: CPT

## 2020-01-01 PROCEDURE — 92610 EVALUATE SWALLOWING FUNCTION: CPT

## 2020-01-01 PROCEDURE — 2580000003 HC RX 258

## 2020-01-01 PROCEDURE — 87086 URINE CULTURE/COLONY COUNT: CPT

## 2020-01-01 PROCEDURE — 82435 ASSAY OF BLOOD CHLORIDE: CPT

## 2020-01-01 PROCEDURE — 71046 X-RAY EXAM CHEST 2 VIEWS: CPT

## 2020-01-01 PROCEDURE — 93308 TTE F-UP OR LMTD: CPT

## 2020-01-01 PROCEDURE — 82247 BILIRUBIN TOTAL: CPT

## 2020-01-01 PROCEDURE — C1894 INTRO/SHEATH, NON-LASER: HCPCS

## 2020-01-01 PROCEDURE — 2780000006 HC MISC HEART VALVE

## 2020-01-01 PROCEDURE — 93005 ELECTROCARDIOGRAM TRACING: CPT | Performed by: INTERNAL MEDICINE

## 2020-01-01 PROCEDURE — 99204 OFFICE O/P NEW MOD 45 MIN: CPT | Performed by: INTERNAL MEDICINE

## 2020-01-01 PROCEDURE — 97166 OT EVAL MOD COMPLEX 45 MIN: CPT

## 2020-01-01 PROCEDURE — 99214 OFFICE O/P EST MOD 30 MIN: CPT

## 2020-01-01 PROCEDURE — B24BZZ4 ULTRASONOGRAPHY OF HEART WITH AORTA, TRANSESOPHAGEAL: ICD-10-PCS | Performed by: INTERNAL MEDICINE

## 2020-01-01 PROCEDURE — 82805 BLOOD GASES W/O2 SATURATION: CPT

## 2020-01-01 PROCEDURE — 96374 THER/PROPH/DIAG INJ IV PUSH: CPT

## 2020-01-01 PROCEDURE — 87205 SMEAR GRAM STAIN: CPT

## 2020-01-01 PROCEDURE — 74230 X-RAY XM SWLNG FUNCJ C+: CPT

## 2020-01-01 PROCEDURE — 73562 X-RAY EXAM OF KNEE 3: CPT

## 2020-01-01 PROCEDURE — B24BZZZ ULTRASONOGRAPHY OF HEART WITH AORTA: ICD-10-PCS | Performed by: INTERNAL MEDICINE

## 2020-01-01 PROCEDURE — 6370000000 HC RX 637 (ALT 250 FOR IP): Performed by: OTOLARYNGOLOGY

## 2020-01-01 PROCEDURE — 83605 ASSAY OF LACTIC ACID: CPT

## 2020-01-01 PROCEDURE — 96375 TX/PRO/DX INJ NEW DRUG ADDON: CPT

## 2020-01-01 RX ORDER — ACETAMINOPHEN 325 MG/1
650 TABLET ORAL EVERY 4 HOURS PRN
Status: DISCONTINUED | OUTPATIENT
Start: 2020-01-01 | End: 2020-01-01 | Stop reason: HOSPADM

## 2020-01-01 RX ORDER — FUROSEMIDE 10 MG/ML
20 INJECTION INTRAMUSCULAR; INTRAVENOUS ONCE
Status: DISCONTINUED | OUTPATIENT
Start: 2020-01-01 | End: 2020-01-01

## 2020-01-01 RX ORDER — OXYCODONE HYDROCHLORIDE 5 MG/1
5 TABLET ORAL EVERY 6 HOURS PRN
Status: DISCONTINUED | OUTPATIENT
Start: 2020-01-01 | End: 2020-01-01 | Stop reason: HOSPADM

## 2020-01-01 RX ORDER — METHYLPREDNISOLONE SODIUM SUCCINATE 40 MG/ML
40 INJECTION, POWDER, LYOPHILIZED, FOR SOLUTION INTRAMUSCULAR; INTRAVENOUS DAILY
Status: DISCONTINUED | OUTPATIENT
Start: 2020-01-01 | End: 2020-01-01 | Stop reason: HOSPADM

## 2020-01-01 RX ORDER — ALBUTEROL SULFATE 90 UG/1
1 AEROSOL, METERED RESPIRATORY (INHALATION) 4 TIMES DAILY
Status: DISCONTINUED | OUTPATIENT
Start: 2020-01-01 | End: 2020-01-01 | Stop reason: HOSPADM

## 2020-01-01 RX ORDER — ONDANSETRON 2 MG/ML
INJECTION INTRAMUSCULAR; INTRAVENOUS PRN
Status: DISCONTINUED | OUTPATIENT
Start: 2020-01-01 | End: 2020-01-01 | Stop reason: SDUPTHER

## 2020-01-01 RX ORDER — WARFARIN SODIUM 3 MG/1
3 TABLET ORAL
Status: DISCONTINUED | OUTPATIENT
Start: 2020-01-01 | End: 2020-01-01 | Stop reason: HOSPADM

## 2020-01-01 RX ORDER — CLOPIDOGREL BISULFATE 75 MG/1
75 TABLET ORAL DAILY
Status: DISCONTINUED | OUTPATIENT
Start: 2020-01-01 | End: 2020-01-01 | Stop reason: HOSPADM

## 2020-01-01 RX ORDER — FUROSEMIDE 10 MG/ML
20 INJECTION INTRAMUSCULAR; INTRAVENOUS 2 TIMES DAILY
Status: DISCONTINUED | OUTPATIENT
Start: 2020-01-01 | End: 2020-01-01

## 2020-01-01 RX ORDER — GUAIFENESIN 600 MG/1
600 TABLET, EXTENDED RELEASE ORAL 2 TIMES DAILY
Status: DISCONTINUED | OUTPATIENT
Start: 2020-01-01 | End: 2020-01-01 | Stop reason: HOSPADM

## 2020-01-01 RX ORDER — HYDRALAZINE HYDROCHLORIDE 50 MG/1
50 TABLET, FILM COATED ORAL EVERY 8 HOURS SCHEDULED
Status: DISCONTINUED | OUTPATIENT
Start: 2020-01-01 | End: 2020-01-01 | Stop reason: HOSPADM

## 2020-01-01 RX ORDER — SODIUM CHLORIDE 0.9 % (FLUSH) 0.9 %
10 SYRINGE (ML) INJECTION EVERY 12 HOURS SCHEDULED
Status: DISCONTINUED | OUTPATIENT
Start: 2020-01-01 | End: 2020-01-01 | Stop reason: HOSPADM

## 2020-01-01 RX ORDER — CLOPIDOGREL BISULFATE 75 MG/1
75 TABLET ORAL DAILY
Status: DISCONTINUED | OUTPATIENT
Start: 2020-01-01 | End: 2020-01-01

## 2020-01-01 RX ORDER — FUROSEMIDE 10 MG/ML
40 INJECTION INTRAMUSCULAR; INTRAVENOUS ONCE
Status: COMPLETED | OUTPATIENT
Start: 2020-01-01 | End: 2020-01-01

## 2020-01-01 RX ORDER — 0.9 % SODIUM CHLORIDE 0.9 %
20 INTRAVENOUS SOLUTION INTRAVENOUS ONCE
Status: DISCONTINUED | OUTPATIENT
Start: 2020-01-01 | End: 2020-01-01 | Stop reason: HOSPADM

## 2020-01-01 RX ORDER — HYDROCHLOROTHIAZIDE 12.5 MG/1
12.5 CAPSULE, GELATIN COATED ORAL
Status: ON HOLD | COMMUNITY
End: 2020-01-01 | Stop reason: HOSPADM

## 2020-01-01 RX ORDER — ATORVASTATIN CALCIUM 10 MG/1
10 TABLET, FILM COATED ORAL DAILY
Status: DISCONTINUED | OUTPATIENT
Start: 2020-01-01 | End: 2020-01-01

## 2020-01-01 RX ORDER — ISOSORBIDE MONONITRATE 30 MG/1
30 TABLET, EXTENDED RELEASE ORAL DAILY
Status: DISCONTINUED | OUTPATIENT
Start: 2020-01-01 | End: 2020-01-01

## 2020-01-01 RX ORDER — ALBUTEROL SULFATE 2.5 MG/3ML
1.25 SOLUTION RESPIRATORY (INHALATION) EVERY 6 HOURS PRN
Status: DISCONTINUED | OUTPATIENT
Start: 2020-01-01 | End: 2020-01-01 | Stop reason: HOSPADM

## 2020-01-01 RX ORDER — AMLODIPINE BESYLATE 10 MG/1
10 TABLET ORAL DAILY
Status: DISCONTINUED | OUTPATIENT
Start: 2020-01-01 | End: 2020-01-01 | Stop reason: HOSPADM

## 2020-01-01 RX ORDER — FLUTICASONE PROPIONATE 220 UG/1
1 AEROSOL, METERED RESPIRATORY (INHALATION) 2 TIMES DAILY
Status: DISCONTINUED | OUTPATIENT
Start: 2020-01-01 | End: 2020-01-01 | Stop reason: HOSPADM

## 2020-01-01 RX ORDER — PANTOPRAZOLE SODIUM 40 MG/1
40 TABLET, DELAYED RELEASE ORAL
Status: DISCONTINUED | OUTPATIENT
Start: 2020-01-01 | End: 2020-01-01 | Stop reason: HOSPADM

## 2020-01-01 RX ORDER — FENTANYL CITRATE 50 UG/ML
INJECTION, SOLUTION INTRAMUSCULAR; INTRAVENOUS PRN
Status: DISCONTINUED | OUTPATIENT
Start: 2020-01-01 | End: 2020-01-01 | Stop reason: SDUPTHER

## 2020-01-01 RX ORDER — NITROGLYCERIN 20 MG/100ML
5 INJECTION INTRAVENOUS CONTINUOUS
Status: DISCONTINUED | OUTPATIENT
Start: 2020-01-01 | End: 2020-01-01

## 2020-01-01 RX ORDER — MORPHINE SULFATE 2 MG/ML
2 INJECTION, SOLUTION INTRAMUSCULAR; INTRAVENOUS
Status: DISCONTINUED | OUTPATIENT
Start: 2020-01-01 | End: 2020-01-01 | Stop reason: HOSPADM

## 2020-01-01 RX ORDER — CLOPIDOGREL BISULFATE 75 MG/1
75 TABLET ORAL DAILY
Status: ON HOLD | COMMUNITY
Start: 2019-10-16 | End: 2020-01-01 | Stop reason: HOSPADM

## 2020-01-01 RX ORDER — LIDOCAINE HYDROCHLORIDE 10 MG/ML
INJECTION, SOLUTION INFILTRATION; PERINEURAL PRN
Status: DISCONTINUED | OUTPATIENT
Start: 2020-01-01 | End: 2020-01-01 | Stop reason: SDUPTHER

## 2020-01-01 RX ORDER — ATORVASTATIN CALCIUM 10 MG/1
10 TABLET, FILM COATED ORAL
Status: ON HOLD | COMMUNITY
End: 2020-01-01 | Stop reason: HOSPADM

## 2020-01-01 RX ORDER — GINSENG 100 MG
CAPSULE ORAL
Qty: 500 EACH | Refills: 0 | Status: SHIPPED | OUTPATIENT
Start: 2020-01-01 | End: 2020-01-01

## 2020-01-01 RX ORDER — ONDANSETRON 2 MG/ML
4 INJECTION INTRAMUSCULAR; INTRAVENOUS EVERY 6 HOURS PRN
Status: DISCONTINUED | OUTPATIENT
Start: 2020-01-01 | End: 2020-01-01 | Stop reason: HOSPADM

## 2020-01-01 RX ORDER — LIDOCAINE HYDROCHLORIDE 10 MG/ML
INJECTION, SOLUTION EPIDURAL; INFILTRATION; INTRACAUDAL; PERINEURAL PRN
Status: DISCONTINUED | OUTPATIENT
Start: 2020-01-01 | End: 2020-01-01 | Stop reason: SDUPTHER

## 2020-01-01 RX ORDER — HYDROCHLOROTHIAZIDE 12.5 MG/1
12.5 CAPSULE, GELATIN COATED ORAL DAILY
Status: DISCONTINUED | OUTPATIENT
Start: 2020-01-01 | End: 2020-01-01 | Stop reason: HOSPADM

## 2020-01-01 RX ORDER — PREDNISONE 10 MG/1
20 TABLET ORAL DAILY
Qty: 6 TABLET | Refills: 0 | Status: SHIPPED | OUTPATIENT
Start: 2020-01-01 | End: 2020-01-01

## 2020-01-01 RX ORDER — PANTOPRAZOLE SODIUM 40 MG/1
40 TABLET, DELAYED RELEASE ORAL
Qty: 30 TABLET | Refills: 3 | Status: SHIPPED | OUTPATIENT
Start: 2020-01-01

## 2020-01-01 RX ORDER — ATORVASTATIN CALCIUM 10 MG/1
10 TABLET, FILM COATED ORAL DAILY
Status: DISCONTINUED | OUTPATIENT
Start: 2020-01-01 | End: 2020-01-01 | Stop reason: HOSPADM

## 2020-01-01 RX ORDER — ALBUTEROL SULFATE 2.5 MG/3ML
2.5 SOLUTION RESPIRATORY (INHALATION)
Status: DISCONTINUED | OUTPATIENT
Start: 2020-01-01 | End: 2020-01-01 | Stop reason: HOSPADM

## 2020-01-01 RX ORDER — 0.9 % SODIUM CHLORIDE 0.9 %
20 INTRAVENOUS SOLUTION INTRAVENOUS ONCE
Status: COMPLETED | OUTPATIENT
Start: 2020-01-01 | End: 2020-01-01

## 2020-01-01 RX ORDER — WARFARIN SODIUM 1 MG/1
1 TABLET ORAL DAILY
Status: DISCONTINUED | OUTPATIENT
Start: 2020-01-01 | End: 2020-01-01

## 2020-01-01 RX ORDER — HYDRALAZINE HYDROCHLORIDE 25 MG/1
25 TABLET, FILM COATED ORAL ONCE
Status: COMPLETED | OUTPATIENT
Start: 2020-01-01 | End: 2020-01-01

## 2020-01-01 RX ORDER — PANTOPRAZOLE SODIUM 40 MG/10ML
40 INJECTION, POWDER, LYOPHILIZED, FOR SOLUTION INTRAVENOUS 2 TIMES DAILY
Status: DISCONTINUED | OUTPATIENT
Start: 2020-01-01 | End: 2020-01-01

## 2020-01-01 RX ORDER — 0.9 % SODIUM CHLORIDE 0.9 %
250 INTRAVENOUS SOLUTION INTRAVENOUS ONCE
Status: DISCONTINUED | OUTPATIENT
Start: 2020-01-01 | End: 2020-01-01

## 2020-01-01 RX ORDER — SODIUM CHLORIDE, SODIUM LACTATE, POTASSIUM CHLORIDE, CALCIUM CHLORIDE 600; 310; 30; 20 MG/100ML; MG/100ML; MG/100ML; MG/100ML
INJECTION, SOLUTION INTRAVENOUS CONTINUOUS PRN
Status: DISCONTINUED | OUTPATIENT
Start: 2020-01-01 | End: 2020-01-01 | Stop reason: SDUPTHER

## 2020-01-01 RX ORDER — PROPOFOL 10 MG/ML
INJECTION, EMULSION INTRAVENOUS CONTINUOUS PRN
Status: DISCONTINUED | OUTPATIENT
Start: 2020-01-01 | End: 2020-01-01 | Stop reason: SDUPTHER

## 2020-01-01 RX ORDER — ATORVASTATIN CALCIUM 10 MG/1
10 TABLET, FILM COATED ORAL NIGHTLY
Status: DISCONTINUED | OUTPATIENT
Start: 2020-01-01 | End: 2020-01-01 | Stop reason: HOSPADM

## 2020-01-01 RX ORDER — ISOSORBIDE MONONITRATE 60 MG/1
60 TABLET, EXTENDED RELEASE ORAL DAILY
Qty: 30 TABLET | Refills: 3 | Status: SHIPPED | OUTPATIENT
Start: 2020-01-01

## 2020-01-01 RX ORDER — ACETAMINOPHEN 325 MG/1
650 TABLET ORAL EVERY 6 HOURS PRN
Status: DISCONTINUED | OUTPATIENT
Start: 2020-01-01 | End: 2020-01-01 | Stop reason: HOSPADM

## 2020-01-01 RX ORDER — FUROSEMIDE 40 MG/1
80 TABLET ORAL 2 TIMES DAILY
Status: DISCONTINUED | OUTPATIENT
Start: 2020-01-01 | End: 2020-01-01

## 2020-01-01 RX ORDER — FUROSEMIDE 40 MG/1
40 TABLET ORAL DAILY
Status: DISCONTINUED | OUTPATIENT
Start: 2020-01-01 | End: 2020-01-01 | Stop reason: HOSPADM

## 2020-01-01 RX ORDER — ALBUTEROL SULFATE 2.5 MG/3ML
10 SOLUTION RESPIRATORY (INHALATION) EVERY 6 HOURS PRN
Status: DISCONTINUED | OUTPATIENT
Start: 2020-01-01 | End: 2020-01-01

## 2020-01-01 RX ORDER — MAGNESIUM SULFATE 1 G/100ML
1 INJECTION INTRAVENOUS PRN
Status: DISCONTINUED | OUTPATIENT
Start: 2020-01-01 | End: 2020-01-01 | Stop reason: HOSPADM

## 2020-01-01 RX ORDER — HYDRALAZINE HYDROCHLORIDE 25 MG/1
25 TABLET, FILM COATED ORAL EVERY 8 HOURS SCHEDULED
Status: DISCONTINUED | OUTPATIENT
Start: 2020-01-01 | End: 2020-01-01

## 2020-01-01 RX ORDER — ACETAMINOPHEN 325 MG/1
650 TABLET ORAL EVERY 8 HOURS PRN
Status: DISCONTINUED | OUTPATIENT
Start: 2020-01-01 | End: 2020-01-01 | Stop reason: DRUGHIGH

## 2020-01-01 RX ORDER — SODIUM CHLORIDE 9 MG/ML
10 INJECTION INTRAVENOUS DAILY
Status: DISCONTINUED | OUTPATIENT
Start: 2020-01-01 | End: 2020-01-01

## 2020-01-01 RX ORDER — LABETALOL HYDROCHLORIDE 5 MG/ML
10 INJECTION, SOLUTION INTRAVENOUS
Status: DISCONTINUED | OUTPATIENT
Start: 2020-01-01 | End: 2020-01-01 | Stop reason: HOSPADM

## 2020-01-01 RX ORDER — CLOPIDOGREL BISULFATE 75 MG/1
300 TABLET ORAL ONCE
Status: COMPLETED | OUTPATIENT
Start: 2020-01-01 | End: 2020-01-01

## 2020-01-01 RX ORDER — ECHINACEA PURPUREA EXTRACT 125 MG
2 TABLET ORAL 3 TIMES DAILY
Qty: 1 BOTTLE | Refills: 1 | Status: SHIPPED | OUTPATIENT
Start: 2020-01-01 | End: 2020-01-01

## 2020-01-01 RX ORDER — FUROSEMIDE 10 MG/ML
INJECTION INTRAMUSCULAR; INTRAVENOUS
Status: COMPLETED
Start: 2020-01-01 | End: 2020-01-01

## 2020-01-01 RX ORDER — SODIUM CHLORIDE 0.9 % (FLUSH) 0.9 %
10 SYRINGE (ML) INJECTION PRN
Status: DISCONTINUED | OUTPATIENT
Start: 2020-01-01 | End: 2020-01-01 | Stop reason: HOSPADM

## 2020-01-01 RX ORDER — ALBUTEROL SULFATE 90 UG/1
2 AEROSOL, METERED RESPIRATORY (INHALATION) EVERY 6 HOURS PRN
Status: DISCONTINUED | OUTPATIENT
Start: 2020-01-01 | End: 2020-01-01 | Stop reason: HOSPADM

## 2020-01-01 RX ORDER — POTASSIUM CHLORIDE 7.45 MG/ML
10 INJECTION INTRAVENOUS PRN
Status: DISCONTINUED | OUTPATIENT
Start: 2020-01-01 | End: 2020-01-01 | Stop reason: HOSPADM

## 2020-01-01 RX ORDER — METOPROLOL TARTRATE 37.5 MG/1
37.5 TABLET, FILM COATED ORAL 2 TIMES DAILY
Qty: 60 TABLET | Refills: 3 | Status: SHIPPED | OUTPATIENT
Start: 2020-01-01

## 2020-01-01 RX ORDER — SODIUM CHLORIDE 9 MG/ML
INJECTION, SOLUTION INTRAVENOUS CONTINUOUS
Status: DISCONTINUED | OUTPATIENT
Start: 2020-01-01 | End: 2020-01-01

## 2020-01-01 RX ORDER — FUROSEMIDE 10 MG/ML
40 INJECTION INTRAMUSCULAR; INTRAVENOUS 2 TIMES DAILY
Status: DISCONTINUED | OUTPATIENT
Start: 2020-01-01 | End: 2020-01-01

## 2020-01-01 RX ORDER — CELECOXIB 100 MG/1
200 CAPSULE ORAL 2 TIMES DAILY
Status: DISCONTINUED | OUTPATIENT
Start: 2020-01-01 | End: 2020-01-01 | Stop reason: HOSPADM

## 2020-01-01 RX ORDER — SODIUM CHLORIDE 9 MG/ML
INJECTION, SOLUTION INTRAVENOUS CONTINUOUS
Status: DISCONTINUED | OUTPATIENT
Start: 2020-01-01 | End: 2020-01-01 | Stop reason: HOSPADM

## 2020-01-01 RX ORDER — PREDNISONE 1 MG/1
10 TABLET ORAL DAILY
Qty: 6 TABLET | Refills: 0 | Status: SHIPPED | OUTPATIENT
Start: 2020-01-01 | End: 2020-01-01

## 2020-01-01 RX ORDER — AMLODIPINE BESYLATE 10 MG/1
10 TABLET ORAL DAILY
Qty: 30 TABLET | Refills: 3 | Status: SHIPPED | OUTPATIENT
Start: 2020-01-01

## 2020-01-01 RX ORDER — ATORVASTATIN CALCIUM 10 MG/1
10 TABLET, FILM COATED ORAL NIGHTLY
Status: DISCONTINUED | OUTPATIENT
Start: 2020-01-01 | End: 2020-01-01

## 2020-01-01 RX ORDER — PANTOPRAZOLE SODIUM 40 MG/10ML
40 INJECTION, POWDER, LYOPHILIZED, FOR SOLUTION INTRAVENOUS ONCE
Status: COMPLETED | OUTPATIENT
Start: 2020-01-01 | End: 2020-01-01

## 2020-01-01 RX ORDER — IPRATROPIUM BROMIDE AND ALBUTEROL SULFATE 2.5; .5 MG/3ML; MG/3ML
3 SOLUTION RESPIRATORY (INHALATION) 4 TIMES DAILY
Qty: 360 ML | Refills: 3 | Status: SHIPPED | OUTPATIENT
Start: 2020-01-01

## 2020-01-01 RX ORDER — FUROSEMIDE 40 MG/1
40 TABLET ORAL DAILY
Status: DISCONTINUED | OUTPATIENT
Start: 2020-01-01 | End: 2020-01-01

## 2020-01-01 RX ORDER — CLOPIDOGREL BISULFATE 75 MG/1
300 TABLET ORAL ONCE
Status: DISCONTINUED | OUTPATIENT
Start: 2020-01-01 | End: 2020-01-01

## 2020-01-01 RX ORDER — MONTELUKAST SODIUM 10 MG/1
10 TABLET ORAL NIGHTLY
Status: DISCONTINUED | OUTPATIENT
Start: 2020-01-01 | End: 2020-01-01 | Stop reason: HOSPADM

## 2020-01-01 RX ORDER — METHYLPREDNISOLONE SODIUM SUCCINATE 125 MG/2ML
80 INJECTION, POWDER, LYOPHILIZED, FOR SOLUTION INTRAMUSCULAR; INTRAVENOUS ONCE
Status: COMPLETED | OUTPATIENT
Start: 2020-01-01 | End: 2020-01-01

## 2020-01-01 RX ORDER — ISOSORBIDE MONONITRATE 60 MG/1
60 TABLET, EXTENDED RELEASE ORAL DAILY
Status: DISCONTINUED | OUTPATIENT
Start: 2020-01-01 | End: 2020-01-01 | Stop reason: HOSPADM

## 2020-01-01 RX ORDER — SODIUM CHLORIDE 9 MG/ML
INJECTION, SOLUTION INTRAVENOUS CONTINUOUS PRN
Status: DISCONTINUED | OUTPATIENT
Start: 2020-01-01 | End: 2020-01-01 | Stop reason: SDUPTHER

## 2020-01-01 RX ORDER — WARFARIN SODIUM 2 MG/1
2 TABLET ORAL
Status: DISCONTINUED | OUTPATIENT
Start: 2020-01-01 | End: 2020-01-01

## 2020-01-01 RX ORDER — WARFARIN SODIUM 2 MG/1
2 TABLET ORAL DAILY
Status: DISCONTINUED | OUTPATIENT
Start: 2020-01-01 | End: 2020-01-01

## 2020-01-01 RX ORDER — FUROSEMIDE 80 MG
80 TABLET ORAL 2 TIMES DAILY
Status: ON HOLD | COMMUNITY
End: 2020-01-01 | Stop reason: HOSPADM

## 2020-01-01 RX ORDER — PROMETHAZINE HYDROCHLORIDE 12.5 MG/1
12.5 TABLET ORAL EVERY 6 HOURS PRN
Status: DISCONTINUED | OUTPATIENT
Start: 2020-01-01 | End: 2020-01-01 | Stop reason: HOSPADM

## 2020-01-01 RX ORDER — LOSARTAN POTASSIUM 50 MG/1
50 TABLET ORAL DAILY
COMMUNITY
End: 2020-01-01

## 2020-01-01 RX ORDER — PROPOFOL 10 MG/ML
INJECTION, EMULSION INTRAVENOUS PRN
Status: DISCONTINUED | OUTPATIENT
Start: 2020-01-01 | End: 2020-01-01 | Stop reason: SDUPTHER

## 2020-01-01 RX ORDER — PREDNISONE 10 MG/1
30 TABLET ORAL DAILY
Qty: 9 TABLET | Refills: 0 | Status: SHIPPED | OUTPATIENT
Start: 2020-01-01 | End: 2020-01-01

## 2020-01-01 RX ORDER — HYDROCHLOROTHIAZIDE 12.5 MG/1
12.5 CAPSULE, GELATIN COATED ORAL DAILY
Status: DISCONTINUED | OUTPATIENT
Start: 2020-01-01 | End: 2020-01-01

## 2020-01-01 RX ORDER — ECHINACEA PURPUREA EXTRACT 125 MG
2 TABLET ORAL 3 TIMES DAILY
Status: DISCONTINUED | OUTPATIENT
Start: 2020-01-01 | End: 2020-01-01 | Stop reason: HOSPADM

## 2020-01-01 RX ORDER — ACETAMINOPHEN 650 MG/1
650 SUPPOSITORY RECTAL EVERY 6 HOURS PRN
Status: DISCONTINUED | OUTPATIENT
Start: 2020-01-01 | End: 2020-01-01 | Stop reason: HOSPADM

## 2020-01-01 RX ORDER — WARFARIN SODIUM 2 MG/1
2 TABLET ORAL
Status: COMPLETED | OUTPATIENT
Start: 2020-01-01 | End: 2020-01-01

## 2020-01-01 RX ORDER — POTASSIUM CHLORIDE 20 MEQ/1
40 TABLET, EXTENDED RELEASE ORAL ONCE
Status: DISCONTINUED | OUTPATIENT
Start: 2020-01-01 | End: 2020-01-01 | Stop reason: HOSPADM

## 2020-01-01 RX ORDER — HYDRALAZINE HYDROCHLORIDE 50 MG/1
50 TABLET, FILM COATED ORAL EVERY 8 HOURS SCHEDULED
Qty: 90 TABLET | Refills: 3 | Status: SHIPPED | OUTPATIENT
Start: 2020-01-01

## 2020-01-01 RX ORDER — LOSARTAN POTASSIUM 50 MG/1
50 TABLET ORAL DAILY
COMMUNITY

## 2020-01-01 RX ORDER — MORPHINE SULFATE 4 MG/ML
4 INJECTION, SOLUTION INTRAMUSCULAR; INTRAVENOUS
Status: DISCONTINUED | OUTPATIENT
Start: 2020-01-01 | End: 2020-01-01 | Stop reason: HOSPADM

## 2020-01-01 RX ORDER — SODIUM CHLORIDE 9 MG/ML
10 INJECTION INTRAVENOUS ONCE
Status: COMPLETED | OUTPATIENT
Start: 2020-01-01 | End: 2020-01-01

## 2020-01-01 RX ORDER — HEPARIN SODIUM 5000 [USP'U]/ML
5000 INJECTION, SOLUTION INTRAVENOUS; SUBCUTANEOUS EVERY 8 HOURS SCHEDULED
Status: DISCONTINUED | OUTPATIENT
Start: 2020-01-01 | End: 2020-01-01 | Stop reason: HOSPADM

## 2020-01-01 RX ORDER — GUAIFENESIN 600 MG/1
600 TABLET, EXTENDED RELEASE ORAL 2 TIMES DAILY
Qty: 10 TABLET | Refills: 0 | Status: SHIPPED | OUTPATIENT
Start: 2020-01-01 | End: 2020-01-01

## 2020-01-01 RX ORDER — ACETAMINOPHEN 325 MG/1
650 TABLET ORAL EVERY 8 HOURS PRN
Status: DISCONTINUED | OUTPATIENT
Start: 2020-01-01 | End: 2020-01-01

## 2020-01-01 RX ORDER — FUROSEMIDE 10 MG/ML
40 INJECTION INTRAMUSCULAR; INTRAVENOUS 2 TIMES DAILY
Status: DISCONTINUED | OUTPATIENT
Start: 2020-01-01 | End: 2020-01-01 | Stop reason: HOSPADM

## 2020-01-01 RX ORDER — WARFARIN SODIUM 1 MG/1
1 TABLET ORAL DAILY
Status: ON HOLD | COMMUNITY
End: 2020-01-01 | Stop reason: HOSPADM

## 2020-01-01 RX ORDER — PANTOPRAZOLE SODIUM 40 MG/10ML
40 INJECTION, POWDER, LYOPHILIZED, FOR SOLUTION INTRAVENOUS DAILY
Status: DISCONTINUED | OUTPATIENT
Start: 2020-01-01 | End: 2020-01-01

## 2020-01-01 RX ORDER — CEPHALEXIN 500 MG/1
500 CAPSULE ORAL 2 TIMES DAILY
Qty: 10 CAPSULE | Refills: 0 | Status: ON HOLD | OUTPATIENT
Start: 2020-01-01 | End: 2020-01-01 | Stop reason: HOSPADM

## 2020-01-01 RX ORDER — CELECOXIB 200 MG/1
200 CAPSULE ORAL DAILY
Status: ON HOLD | COMMUNITY
End: 2020-01-01 | Stop reason: HOSPADM

## 2020-01-01 RX ORDER — ALBUTEROL SULFATE 2.5 MG/3ML
1.25 SOLUTION RESPIRATORY (INHALATION) EVERY 6 HOURS PRN
Status: DISCONTINUED | OUTPATIENT
Start: 2020-01-01 | End: 2020-01-01

## 2020-01-01 RX ORDER — IPRATROPIUM BROMIDE AND ALBUTEROL SULFATE 2.5; .5 MG/3ML; MG/3ML
1 SOLUTION RESPIRATORY (INHALATION) 4 TIMES DAILY
Status: DISCONTINUED | OUTPATIENT
Start: 2020-01-01 | End: 2020-01-01 | Stop reason: HOSPADM

## 2020-01-01 RX ORDER — POTASSIUM CHLORIDE 20 MEQ/1
40 TABLET, EXTENDED RELEASE ORAL PRN
Status: DISCONTINUED | OUTPATIENT
Start: 2020-01-01 | End: 2020-01-01 | Stop reason: HOSPADM

## 2020-01-01 RX ORDER — LABETALOL HYDROCHLORIDE 5 MG/ML
10 INJECTION, SOLUTION INTRAVENOUS ONCE
Status: COMPLETED | OUTPATIENT
Start: 2020-01-01 | End: 2020-01-01

## 2020-01-01 RX ORDER — CALCIUM CARBONATE 500(1250)
1250 TABLET ORAL 2 TIMES DAILY
Status: DISCONTINUED | OUTPATIENT
Start: 2020-01-01 | End: 2020-01-01 | Stop reason: HOSPADM

## 2020-01-01 RX ORDER — PROTAMINE SULFATE 10 MG/ML
INJECTION, SOLUTION INTRAVENOUS PRN
Status: DISCONTINUED | OUTPATIENT
Start: 2020-01-01 | End: 2020-01-01 | Stop reason: SDUPTHER

## 2020-01-01 RX ORDER — VANCOMYCIN HYDROCHLORIDE 1 G/20ML
INJECTION, POWDER, LYOPHILIZED, FOR SOLUTION INTRAVENOUS PRN
Status: DISCONTINUED | OUTPATIENT
Start: 2020-01-01 | End: 2020-01-01

## 2020-01-01 RX ORDER — SODIUM CHLORIDE 9 MG/ML
INJECTION, SOLUTION INTRAVENOUS CONTINUOUS
Status: CANCELLED | OUTPATIENT
Start: 2020-01-01

## 2020-01-01 RX ORDER — MONTELUKAST SODIUM 10 MG/1
10 TABLET ORAL NIGHTLY
Status: DISCONTINUED | OUTPATIENT
Start: 2020-01-01 | End: 2020-01-01

## 2020-01-01 RX ORDER — LOSARTAN POTASSIUM 50 MG/1
50 TABLET ORAL DAILY
Status: DISCONTINUED | OUTPATIENT
Start: 2020-01-01 | End: 2020-01-01 | Stop reason: HOSPADM

## 2020-01-01 RX ORDER — UMECLIDINIUM BROMIDE AND VILANTEROL TRIFENATATE 62.5; 25 UG/1; UG/1
1 POWDER RESPIRATORY (INHALATION) DAILY
Qty: 1 EACH | Refills: 3 | Status: SHIPPED | OUTPATIENT
Start: 2020-01-01

## 2020-01-01 RX ORDER — VALACYCLOVIR HYDROCHLORIDE 1 G/1
1000 TABLET, FILM COATED ORAL 3 TIMES DAILY
Qty: 15 TABLET | Refills: 0 | Status: SHIPPED | OUTPATIENT
Start: 2020-01-01 | End: 2020-01-01

## 2020-01-01 RX ORDER — PREDNISONE 20 MG/1
40 TABLET ORAL DAILY
Qty: 6 TABLET | Refills: 0 | Status: SHIPPED | OUTPATIENT
Start: 2020-01-01 | End: 2020-01-01

## 2020-01-01 RX ORDER — LABETALOL HYDROCHLORIDE 5 MG/ML
10 INJECTION, SOLUTION INTRAVENOUS EVERY 30 MIN PRN
Status: DISCONTINUED | OUTPATIENT
Start: 2020-01-01 | End: 2020-01-01 | Stop reason: HOSPADM

## 2020-01-01 RX ORDER — HYDRALAZINE HYDROCHLORIDE 20 MG/ML
10 INJECTION INTRAMUSCULAR; INTRAVENOUS EVERY 6 HOURS PRN
Status: DISCONTINUED | OUTPATIENT
Start: 2020-01-01 | End: 2020-01-01 | Stop reason: HOSPADM

## 2020-01-01 RX ORDER — 0.9 % SODIUM CHLORIDE 0.9 %
250 INTRAVENOUS SOLUTION INTRAVENOUS ONCE
Status: DISCONTINUED | OUTPATIENT
Start: 2020-01-01 | End: 2020-01-01 | Stop reason: HOSPADM

## 2020-01-01 RX ORDER — GINSENG 100 MG
CAPSULE ORAL 3 TIMES DAILY
Status: DISCONTINUED | OUTPATIENT
Start: 2020-01-01 | End: 2020-01-01 | Stop reason: HOSPADM

## 2020-01-01 RX ORDER — CELECOXIB 100 MG/1
200 CAPSULE ORAL 2 TIMES DAILY
Status: DISCONTINUED | OUTPATIENT
Start: 2020-01-01 | End: 2020-01-01

## 2020-01-01 RX ORDER — FENTANYL CITRATE 50 UG/ML
12.5 INJECTION, SOLUTION INTRAMUSCULAR; INTRAVENOUS ONCE
Status: COMPLETED | OUTPATIENT
Start: 2020-01-01 | End: 2020-01-01

## 2020-01-01 RX ORDER — HEPARIN SODIUM 1000 [USP'U]/ML
INJECTION, SOLUTION INTRAVENOUS; SUBCUTANEOUS PRN
Status: DISCONTINUED | OUTPATIENT
Start: 2020-01-01 | End: 2020-01-01 | Stop reason: SDUPTHER

## 2020-01-01 RX ORDER — FUROSEMIDE 40 MG/1
40 TABLET ORAL DAILY
Qty: 60 TABLET | Refills: 3 | Status: SHIPPED | OUTPATIENT
Start: 2020-01-01

## 2020-01-01 RX ORDER — ALBUTEROL SULFATE 2.5 MG/3ML
2.5 SOLUTION RESPIRATORY (INHALATION) 2 TIMES DAILY
Status: DISCONTINUED | OUTPATIENT
Start: 2020-01-01 | End: 2020-01-01

## 2020-01-01 RX ORDER — ASPIRIN 81 MG/1
81 TABLET ORAL DAILY
Status: COMPLETED | OUTPATIENT
Start: 2020-01-01 | End: 2020-01-01

## 2020-01-01 RX ORDER — HYDROCHLOROTHIAZIDE 12.5 MG/1
12.5 CAPSULE, GELATIN COATED ORAL DAILY
Status: ON HOLD | COMMUNITY
End: 2020-01-01 | Stop reason: HOSPADM

## 2020-01-01 RX ORDER — LOSARTAN POTASSIUM 50 MG/1
50 TABLET ORAL DAILY
Status: DISCONTINUED | OUTPATIENT
Start: 2020-01-01 | End: 2020-01-01

## 2020-01-01 RX ORDER — WARFARIN SODIUM 1 MG/1
1 TABLET ORAL
Status: DISCONTINUED | OUTPATIENT
Start: 2020-01-01 | End: 2020-01-01

## 2020-01-01 RX ORDER — IPRATROPIUM BROMIDE AND ALBUTEROL SULFATE 2.5; .5 MG/3ML; MG/3ML
1 SOLUTION RESPIRATORY (INHALATION)
Status: DISCONTINUED | OUTPATIENT
Start: 2020-01-01 | End: 2020-01-01

## 2020-01-01 RX ORDER — VALACYCLOVIR HYDROCHLORIDE 1 G/1
1000 TABLET, FILM COATED ORAL 3 TIMES DAILY
Status: ON HOLD | COMMUNITY
End: 2020-01-01 | Stop reason: SDUPTHER

## 2020-01-01 RX ORDER — LORAZEPAM 0.5 MG/1
0.25 TABLET ORAL ONCE
Status: COMPLETED | OUTPATIENT
Start: 2020-01-01 | End: 2020-01-01

## 2020-01-01 RX ADMIN — Medication 10 MG: at 08:22

## 2020-01-01 RX ADMIN — ALBUTEROL SULFATE 2.5 MG: 2.5 SOLUTION RESPIRATORY (INHALATION) at 18:00

## 2020-01-01 RX ADMIN — ALBUTEROL SULFATE 1 PUFF: 90 AEROSOL, METERED RESPIRATORY (INHALATION) at 11:33

## 2020-01-01 RX ADMIN — PANTOPRAZOLE SODIUM 40 MG: 40 INJECTION, POWDER, FOR SOLUTION INTRAVENOUS at 08:36

## 2020-01-01 RX ADMIN — SALINE NASAL SPRAY 2 SPRAY: 1.5 SOLUTION NASAL at 10:21

## 2020-01-01 RX ADMIN — IPRATROPIUM BROMIDE AND ALBUTEROL SULFATE 1 AMPULE: .5; 3 SOLUTION RESPIRATORY (INHALATION) at 08:31

## 2020-01-01 RX ADMIN — MORPHINE SULFATE 2 MG: 2 INJECTION, SOLUTION INTRAMUSCULAR; INTRAVENOUS at 01:12

## 2020-01-01 RX ADMIN — MONTELUKAST SODIUM 10 MG: 10 TABLET, FILM COATED ORAL at 21:09

## 2020-01-01 RX ADMIN — CALCIUM 1250 MG: 500 TABLET ORAL at 21:34

## 2020-01-01 RX ADMIN — PROTAMINE SULFATE 5 MG: 10 INJECTION, SOLUTION INTRAVENOUS at 09:20

## 2020-01-01 RX ADMIN — MINERAL OIL, PETROLATUM, PHENYLEPHRINE HCL: 14; 74.9; .25 OINTMENT RECTAL at 21:01

## 2020-01-01 RX ADMIN — CLOPIDOGREL BISULFATE 300 MG: 75 TABLET ORAL at 06:19

## 2020-01-01 RX ADMIN — FLUTICASONE PROPIONATE 1 PUFF: 220 AEROSOL, METERED RESPIRATORY (INHALATION) at 20:15

## 2020-01-01 RX ADMIN — ALBUTEROL SULFATE 1 PUFF: 90 AEROSOL, METERED RESPIRATORY (INHALATION) at 15:11

## 2020-01-01 RX ADMIN — PROTAMINE SULFATE 5 MG: 10 INJECTION, SOLUTION INTRAVENOUS at 09:21

## 2020-01-01 RX ADMIN — SODIUM CHLORIDE 20 ML: 0.9 INJECTION, SOLUTION INTRAVENOUS at 21:22

## 2020-01-01 RX ADMIN — SALINE NASAL SPRAY 2 SPRAY: 1.5 SOLUTION NASAL at 19:49

## 2020-01-01 RX ADMIN — BACITRACIN: 500 OINTMENT TOPICAL at 14:07

## 2020-01-01 RX ADMIN — IPRATROPIUM BROMIDE AND ALBUTEROL SULFATE 1 AMPULE: .5; 3 SOLUTION RESPIRATORY (INHALATION) at 20:57

## 2020-01-01 RX ADMIN — METHYLPREDNISOLONE SODIUM SUCCINATE 40 MG: 40 INJECTION, POWDER, FOR SOLUTION INTRAMUSCULAR; INTRAVENOUS at 08:11

## 2020-01-01 RX ADMIN — SODIUM CHLORIDE, PRESERVATIVE FREE 10 ML: 5 INJECTION INTRAVENOUS at 21:37

## 2020-01-01 RX ADMIN — Medication 10 MG: at 15:17

## 2020-01-01 RX ADMIN — HYDRALAZINE HYDROCHLORIDE 25 MG: 25 TABLET ORAL at 07:20

## 2020-01-01 RX ADMIN — METOPROLOL TARTRATE 25 MG: 25 TABLET ORAL at 17:47

## 2020-01-01 RX ADMIN — BACITRACIN: 500 OINTMENT TOPICAL at 14:40

## 2020-01-01 RX ADMIN — METHYLPREDNISOLONE SODIUM SUCCINATE 80 MG: 125 INJECTION, POWDER, FOR SOLUTION INTRAMUSCULAR; INTRAVENOUS at 13:55

## 2020-01-01 RX ADMIN — SALINE NASAL SPRAY 2 SPRAY: 1.5 SOLUTION NASAL at 20:53

## 2020-01-01 RX ADMIN — SODIUM CHLORIDE, PRESERVATIVE FREE 10 ML: 5 INJECTION INTRAVENOUS at 21:23

## 2020-01-01 RX ADMIN — FENTANYL CITRATE 25 MCG: 50 INJECTION INTRAMUSCULAR; INTRAVENOUS at 08:52

## 2020-01-01 RX ADMIN — LOSARTAN POTASSIUM 50 MG: 50 TABLET, FILM COATED ORAL at 08:18

## 2020-01-01 RX ADMIN — OXYCODONE HYDROCHLORIDE 5 MG: 5 TABLET ORAL at 00:26

## 2020-01-01 RX ADMIN — METOPROLOL TARTRATE 37.5 MG: 25 TABLET ORAL at 10:17

## 2020-01-01 RX ADMIN — IPRATROPIUM BROMIDE AND ALBUTEROL SULFATE 1 AMPULE: .5; 3 SOLUTION RESPIRATORY (INHALATION) at 11:33

## 2020-01-01 RX ADMIN — GUAIFENESIN 600 MG: 600 TABLET, EXTENDED RELEASE ORAL at 20:57

## 2020-01-01 RX ADMIN — HYDRALAZINE HYDROCHLORIDE 50 MG: 50 TABLET, FILM COATED ORAL at 05:04

## 2020-01-01 RX ADMIN — GLYCOPYRROLATE AND FORMOTEROL FUMARATE 1 PUFF: 9; 4.8 AEROSOL, METERED RESPIRATORY (INHALATION) at 07:15

## 2020-01-01 RX ADMIN — BACITRACIN: 500 OINTMENT TOPICAL at 19:49

## 2020-01-01 RX ADMIN — IPRATROPIUM BROMIDE AND ALBUTEROL SULFATE 1 AMPULE: .5; 3 SOLUTION RESPIRATORY (INHALATION) at 12:39

## 2020-01-01 RX ADMIN — SODIUM CHLORIDE, PRESERVATIVE FREE 10 ML: 5 INJECTION INTRAVENOUS at 20:22

## 2020-01-01 RX ADMIN — CALCIUM 1250 MG: 500 TABLET ORAL at 10:18

## 2020-01-01 RX ADMIN — FUROSEMIDE 40 MG: 10 INJECTION, SOLUTION INTRAMUSCULAR; INTRAVENOUS at 13:49

## 2020-01-01 RX ADMIN — Medication 81 MG: at 08:27

## 2020-01-01 RX ADMIN — FLUTICASONE PROPIONATE 1 PUFF: 220 AEROSOL, METERED RESPIRATORY (INHALATION) at 07:35

## 2020-01-01 RX ADMIN — ALBUTEROL SULFATE 1 PUFF: 90 AEROSOL, METERED RESPIRATORY (INHALATION) at 20:18

## 2020-01-01 RX ADMIN — DESMOPRESSIN ACETATE 10 MG: 0.2 TABLET ORAL at 20:40

## 2020-01-01 RX ADMIN — LORAZEPAM 0.25 MG: 0.5 TABLET ORAL at 05:50

## 2020-01-01 RX ADMIN — IPRATROPIUM BROMIDE AND ALBUTEROL SULFATE 1 AMPULE: .5; 3 SOLUTION RESPIRATORY (INHALATION) at 21:18

## 2020-01-01 RX ADMIN — ALBUTEROL SULFATE 2.5 MG: 2.5 SOLUTION RESPIRATORY (INHALATION) at 05:12

## 2020-01-01 RX ADMIN — DESMOPRESSIN ACETATE 10 MG: 0.2 TABLET ORAL at 21:01

## 2020-01-01 RX ADMIN — ACETAMINOPHEN 650 MG: 325 TABLET ORAL at 11:15

## 2020-01-01 RX ADMIN — ALBUTEROL SULFATE 2.5 MG: 2.5 SOLUTION RESPIRATORY (INHALATION) at 01:00

## 2020-01-01 RX ADMIN — FUROSEMIDE 40 MG: 40 TABLET ORAL at 10:15

## 2020-01-01 RX ADMIN — CLOPIDOGREL 75 MG: 75 TABLET, FILM COATED ORAL at 18:10

## 2020-01-01 RX ADMIN — GLYCOPYRROLATE AND FORMOTEROL FUMARATE 2 PUFF: 9; 4.8 AEROSOL, METERED RESPIRATORY (INHALATION) at 21:23

## 2020-01-01 RX ADMIN — PROPOFOL 75 MCG/KG/MIN: 10 INJECTION, EMULSION INTRAVENOUS at 08:14

## 2020-01-01 RX ADMIN — WARFARIN SODIUM 2 MG: 2 TABLET ORAL at 21:09

## 2020-01-01 RX ADMIN — METHYLPREDNISOLONE SODIUM SUCCINATE 40 MG: 40 INJECTION, POWDER, FOR SOLUTION INTRAMUSCULAR; INTRAVENOUS at 08:18

## 2020-01-01 RX ADMIN — NITROGLYCERIN 100 MCG/MIN: 20 INJECTION INTRAVENOUS at 22:11

## 2020-01-01 RX ADMIN — METOPROLOL TARTRATE 25 MG: 25 TABLET ORAL at 21:21

## 2020-01-01 RX ADMIN — GUAIFENESIN 600 MG: 600 TABLET, EXTENDED RELEASE ORAL at 08:17

## 2020-01-01 RX ADMIN — PANTOPRAZOLE SODIUM 40 MG: 40 INJECTION, POWDER, FOR SOLUTION INTRAVENOUS at 10:13

## 2020-01-01 RX ADMIN — IPRATROPIUM BROMIDE AND ALBUTEROL SULFATE 1 AMPULE: .5; 3 SOLUTION RESPIRATORY (INHALATION) at 16:30

## 2020-01-01 RX ADMIN — CLOPIDOGREL 75 MG: 75 TABLET, FILM COATED ORAL at 08:28

## 2020-01-01 RX ADMIN — ACETAMINOPHEN 650 MG: 325 TABLET ORAL at 07:13

## 2020-01-01 RX ADMIN — AMLODIPINE BESYLATE 10 MG: 10 TABLET ORAL at 08:37

## 2020-01-01 RX ADMIN — METOPROLOL TARTRATE 37.5 MG: 25 TABLET ORAL at 08:17

## 2020-01-01 RX ADMIN — ONDANSETRON 4 MG: 2 INJECTION, SOLUTION INTRAMUSCULAR; INTRAVENOUS at 08:13

## 2020-01-01 RX ADMIN — PROPOFOL 30 MG: 10 INJECTION, EMULSION INTRAVENOUS at 14:12

## 2020-01-01 RX ADMIN — FLUTICASONE PROPIONATE 1 PUFF: 220 AEROSOL, METERED RESPIRATORY (INHALATION) at 21:23

## 2020-01-01 RX ADMIN — VANCOMYCIN HYDROCHLORIDE 750 MG: 10 INJECTION, POWDER, LYOPHILIZED, FOR SOLUTION INTRAVENOUS at 08:26

## 2020-01-01 RX ADMIN — SODIUM CHLORIDE, PRESERVATIVE FREE 10 ML: 5 INJECTION INTRAVENOUS at 21:06

## 2020-01-01 RX ADMIN — PANTOPRAZOLE SODIUM 40 MG: 40 INJECTION, POWDER, FOR SOLUTION INTRAVENOUS at 08:38

## 2020-01-01 RX ADMIN — ACETAMINOPHEN 650 MG: 325 TABLET ORAL at 20:52

## 2020-01-01 RX ADMIN — ACETAMINOPHEN 650 MG: 325 TABLET ORAL at 04:05

## 2020-01-01 RX ADMIN — METOPROLOL TARTRATE 25 MG: 25 TABLET ORAL at 20:52

## 2020-01-01 RX ADMIN — PHENYLEPHRINE HYDROCHLORIDE 100 MCG: 10 INJECTION INTRAVENOUS at 09:10

## 2020-01-01 RX ADMIN — CLOPIDOGREL 75 MG: 75 TABLET, FILM COATED ORAL at 08:12

## 2020-01-01 RX ADMIN — ALBUTEROL SULFATE 1 PUFF: 90 AEROSOL, METERED RESPIRATORY (INHALATION) at 16:57

## 2020-01-01 RX ADMIN — FLUTICASONE PROPIONATE 1 PUFF: 220 AEROSOL, METERED RESPIRATORY (INHALATION) at 20:57

## 2020-01-01 RX ADMIN — FUROSEMIDE 20 MG: 10 INJECTION, SOLUTION INTRAMUSCULAR; INTRAVENOUS at 06:52

## 2020-01-01 RX ADMIN — ATORVASTATIN CALCIUM 10 MG: 10 TABLET, FILM COATED ORAL at 10:26

## 2020-01-01 RX ADMIN — PROPOFOL 50 MCG/KG/MIN: 10 INJECTION, EMULSION INTRAVENOUS at 08:26

## 2020-01-01 RX ADMIN — SODIUM CHLORIDE, PRESERVATIVE FREE 10 ML: 5 INJECTION INTRAVENOUS at 08:14

## 2020-01-01 RX ADMIN — ALBUTEROL SULFATE 1 PUFF: 90 AEROSOL, METERED RESPIRATORY (INHALATION) at 15:46

## 2020-01-01 RX ADMIN — OXYCODONE HYDROCHLORIDE 5 MG: 5 TABLET ORAL at 10:27

## 2020-01-01 RX ADMIN — ALBUTEROL SULFATE 10 MG: 2.5 SOLUTION RESPIRATORY (INHALATION) at 18:39

## 2020-01-01 RX ADMIN — CEFTRIAXONE SODIUM 1 G: 1 INJECTION, POWDER, FOR SOLUTION INTRAMUSCULAR; INTRAVENOUS at 16:49

## 2020-01-01 RX ADMIN — PANTOPRAZOLE SODIUM 40 MG: 40 INJECTION, POWDER, FOR SOLUTION INTRAVENOUS at 21:21

## 2020-01-01 RX ADMIN — HYDROCHLOROTHIAZIDE 12.5 MG: 12.5 CAPSULE ORAL at 05:07

## 2020-01-01 RX ADMIN — PANTOPRAZOLE SODIUM 40 MG: 40 TABLET, DELAYED RELEASE ORAL at 06:11

## 2020-01-01 RX ADMIN — FUROSEMIDE 40 MG: 40 TABLET ORAL at 08:17

## 2020-01-01 RX ADMIN — BACITRACIN: 500 OINTMENT TOPICAL at 13:56

## 2020-01-01 RX ADMIN — POTASSIUM BICARBONATE 40 MEQ: 782 TABLET, EFFERVESCENT ORAL at 10:26

## 2020-01-01 RX ADMIN — HYDRALAZINE HYDROCHLORIDE 50 MG: 50 TABLET, FILM COATED ORAL at 20:56

## 2020-01-01 RX ADMIN — LOSARTAN POTASSIUM 50 MG: 50 TABLET, FILM COATED ORAL at 05:07

## 2020-01-01 RX ADMIN — NITROGLYCERIN 80 MCG/MIN: 20 INJECTION INTRAVENOUS at 00:54

## 2020-01-01 RX ADMIN — MAGNESIUM SULFATE HEPTAHYDRATE 2 G: 500 INJECTION, SOLUTION INTRAMUSCULAR; INTRAVENOUS at 14:31

## 2020-01-01 RX ADMIN — FUROSEMIDE 40 MG: 10 INJECTION, SOLUTION INTRAMUSCULAR; INTRAVENOUS at 10:27

## 2020-01-01 RX ADMIN — IPRATROPIUM BROMIDE AND ALBUTEROL SULFATE 1 AMPULE: .5; 3 SOLUTION RESPIRATORY (INHALATION) at 12:50

## 2020-01-01 RX ADMIN — LOSARTAN POTASSIUM 50 MG: 50 TABLET, FILM COATED ORAL at 18:10

## 2020-01-01 RX ADMIN — SALINE NASAL SPRAY 2 SPRAY: 1.5 SOLUTION NASAL at 21:01

## 2020-01-01 RX ADMIN — FUROSEMIDE 80 MG: 40 TABLET ORAL at 06:19

## 2020-01-01 RX ADMIN — FLUTICASONE PROPIONATE 1 PUFF: 220 AEROSOL, METERED RESPIRATORY (INHALATION) at 07:15

## 2020-01-01 RX ADMIN — SODIUM CHLORIDE, POTASSIUM CHLORIDE, SODIUM LACTATE AND CALCIUM CHLORIDE: 600; 310; 30; 20 INJECTION, SOLUTION INTRAVENOUS at 07:53

## 2020-01-01 RX ADMIN — FLUTICASONE PROPIONATE 1 PUFF: 220 AEROSOL, METERED RESPIRATORY (INHALATION) at 08:52

## 2020-01-01 RX ADMIN — ALBUTEROL SULFATE 1 PUFF: 90 AEROSOL, METERED RESPIRATORY (INHALATION) at 11:35

## 2020-01-01 RX ADMIN — AMLODIPINE BESYLATE 10 MG: 10 TABLET ORAL at 08:14

## 2020-01-01 RX ADMIN — HYDRALAZINE HYDROCHLORIDE 50 MG: 50 TABLET, FILM COATED ORAL at 13:56

## 2020-01-01 RX ADMIN — FLUTICASONE PROPIONATE 1 PUFF: 220 AEROSOL, METERED RESPIRATORY (INHALATION) at 20:08

## 2020-01-01 RX ADMIN — PANTOPRAZOLE SODIUM 40 MG: 40 TABLET, DELAYED RELEASE ORAL at 08:17

## 2020-01-01 RX ADMIN — FUROSEMIDE 80 MG: 40 TABLET ORAL at 20:22

## 2020-01-01 RX ADMIN — DESMOPRESSIN ACETATE 10 MG: 0.2 TABLET ORAL at 20:57

## 2020-01-01 RX ADMIN — FUROSEMIDE 40 MG: 10 INJECTION, SOLUTION INTRAMUSCULAR; INTRAVENOUS at 08:28

## 2020-01-01 RX ADMIN — BARIUM SULFATE 1000 ML: 1.05 SUSPENSION ORAL; RECTAL at 16:18

## 2020-01-01 RX ADMIN — SODIUM CHLORIDE 10 ML: 9 INJECTION, SOLUTION INTRAMUSCULAR; INTRAVENOUS; SUBCUTANEOUS at 13:33

## 2020-01-01 RX ADMIN — SODIUM CHLORIDE 10 ML: 9 INJECTION, SOLUTION INTRAMUSCULAR; INTRAVENOUS; SUBCUTANEOUS at 08:36

## 2020-01-01 RX ADMIN — SODIUM CHLORIDE 20 ML: 0.9 INJECTION, SOLUTION INTRAVENOUS at 02:00

## 2020-01-01 RX ADMIN — BACITRACIN: 500 OINTMENT TOPICAL at 08:45

## 2020-01-01 RX ADMIN — ATORVASTATIN CALCIUM 10 MG: 10 TABLET, FILM COATED ORAL at 08:28

## 2020-01-01 RX ADMIN — PHENYLEPHRINE HYDROCHLORIDE 100 MCG: 10 INJECTION INTRAVENOUS at 09:03

## 2020-01-01 RX ADMIN — METOPROLOL TARTRATE 37.5 MG: 25 TABLET ORAL at 21:05

## 2020-01-01 RX ADMIN — GUAIFENESIN 600 MG: 600 TABLET, EXTENDED RELEASE ORAL at 21:01

## 2020-01-01 RX ADMIN — IPRATROPIUM BROMIDE AND ALBUTEROL SULFATE 1 AMPULE: .5; 3 SOLUTION RESPIRATORY (INHALATION) at 08:45

## 2020-01-01 RX ADMIN — APIXABAN 2.5 MG: 2.5 TABLET, FILM COATED ORAL at 21:34

## 2020-01-01 RX ADMIN — DESMOPRESSIN ACETATE 10 MG: 0.2 TABLET ORAL at 19:49

## 2020-01-01 RX ADMIN — SODIUM CHLORIDE, PRESERVATIVE FREE 10 ML: 5 INJECTION INTRAVENOUS at 08:49

## 2020-01-01 RX ADMIN — METOPROLOL TARTRATE 12.5 MG: 25 TABLET ORAL at 08:27

## 2020-01-01 RX ADMIN — APIXABAN 2.5 MG: 2.5 TABLET, FILM COATED ORAL at 10:16

## 2020-01-01 RX ADMIN — LOSARTAN POTASSIUM 50 MG: 50 TABLET, FILM COATED ORAL at 08:28

## 2020-01-01 RX ADMIN — Medication 81 MG: at 10:26

## 2020-01-01 RX ADMIN — LOSARTAN POTASSIUM 50 MG: 50 TABLET, FILM COATED ORAL at 10:26

## 2020-01-01 RX ADMIN — ALBUTEROL SULFATE 2 PUFF: 90 AEROSOL, METERED RESPIRATORY (INHALATION) at 18:43

## 2020-01-01 RX ADMIN — GUAIFENESIN 600 MG: 600 TABLET, EXTENDED RELEASE ORAL at 21:22

## 2020-01-01 RX ADMIN — ALBUTEROL SULFATE 1 PUFF: 90 AEROSOL, METERED RESPIRATORY (INHALATION) at 08:52

## 2020-01-01 RX ADMIN — GUAIFENESIN 600 MG: 600 TABLET, EXTENDED RELEASE ORAL at 08:37

## 2020-01-01 RX ADMIN — HYDRALAZINE HYDROCHLORIDE 25 MG: 25 TABLET ORAL at 15:28

## 2020-01-01 RX ADMIN — FENTANYL CITRATE 12.5 MCG: 50 INJECTION, SOLUTION INTRAMUSCULAR; INTRAVENOUS at 16:32

## 2020-01-01 RX ADMIN — SODIUM CHLORIDE, PRESERVATIVE FREE 10 ML: 5 INJECTION INTRAVENOUS at 13:31

## 2020-01-01 RX ADMIN — CEFTRIAXONE SODIUM 1 G: 1 INJECTION, POWDER, FOR SOLUTION INTRAMUSCULAR; INTRAVENOUS at 16:59

## 2020-01-01 RX ADMIN — DESMOPRESSIN ACETATE 10 MG: 0.2 TABLET ORAL at 20:21

## 2020-01-01 RX ADMIN — SALINE NASAL SPRAY 2 SPRAY: 1.5 SOLUTION NASAL at 14:08

## 2020-01-01 RX ADMIN — METHYLPREDNISOLONE SODIUM SUCCINATE 40 MG: 40 INJECTION, POWDER, FOR SOLUTION INTRAMUSCULAR; INTRAVENOUS at 21:21

## 2020-01-01 RX ADMIN — PHENYLEPHRINE HYDROCHLORIDE 20 MCG/MIN: 10 INJECTION INTRAVENOUS at 08:48

## 2020-01-01 RX ADMIN — METHYLPREDNISOLONE SODIUM SUCCINATE 40 MG: 40 INJECTION, POWDER, FOR SOLUTION INTRAMUSCULAR; INTRAVENOUS at 08:36

## 2020-01-01 RX ADMIN — BACITRACIN: 500 OINTMENT TOPICAL at 08:30

## 2020-01-01 RX ADMIN — FUROSEMIDE 40 MG: 10 INJECTION, SOLUTION INTRAMUSCULAR; INTRAVENOUS at 10:13

## 2020-01-01 RX ADMIN — GUAIFENESIN 600 MG: 600 TABLET, EXTENDED RELEASE ORAL at 08:38

## 2020-01-01 RX ADMIN — ATORVASTATIN CALCIUM 10 MG: 10 TABLET, FILM COATED ORAL at 11:14

## 2020-01-01 RX ADMIN — APIXABAN 2.5 MG: 2.5 TABLET, FILM COATED ORAL at 20:20

## 2020-01-01 RX ADMIN — HYDRALAZINE HYDROCHLORIDE 50 MG: 50 TABLET, FILM COATED ORAL at 06:11

## 2020-01-01 RX ADMIN — GUAIFENESIN 600 MG: 600 TABLET, EXTENDED RELEASE ORAL at 10:20

## 2020-01-01 RX ADMIN — HEPARIN SODIUM 5000 UNITS: 5000 INJECTION INTRAVENOUS; SUBCUTANEOUS at 21:09

## 2020-01-01 RX ADMIN — AMLODIPINE BESYLATE 10 MG: 10 TABLET ORAL at 19:49

## 2020-01-01 RX ADMIN — HEPARIN SODIUM 5000 UNITS: 5000 INJECTION INTRAVENOUS; SUBCUTANEOUS at 14:50

## 2020-01-01 RX ADMIN — BACITRACIN: 500 OINTMENT TOPICAL at 10:22

## 2020-01-01 RX ADMIN — GUAIFENESIN 600 MG: 600 TABLET, EXTENDED RELEASE ORAL at 17:49

## 2020-01-01 RX ADMIN — SALINE NASAL SPRAY 2 SPRAY: 1.5 SOLUTION NASAL at 08:45

## 2020-01-01 RX ADMIN — PROPOFOL 125 MCG/KG/MIN: 10 INJECTION, EMULSION INTRAVENOUS at 14:12

## 2020-01-01 RX ADMIN — SODIUM CHLORIDE, PRESERVATIVE FREE 10 ML: 5 INJECTION INTRAVENOUS at 19:50

## 2020-01-01 RX ADMIN — AMLODIPINE BESYLATE 10 MG: 10 TABLET ORAL at 08:17

## 2020-01-01 RX ADMIN — METOPROLOL TARTRATE 12.5 MG: 25 TABLET ORAL at 22:06

## 2020-01-01 RX ADMIN — PHENYLEPHRINE HYDROCHLORIDE 100 MCG: 10 INJECTION INTRAVENOUS at 09:09

## 2020-01-01 RX ADMIN — SALINE NASAL SPRAY 2 SPRAY: 1.5 SOLUTION NASAL at 13:56

## 2020-01-01 RX ADMIN — IPRATROPIUM BROMIDE AND ALBUTEROL SULFATE 1 AMPULE: .5; 3 SOLUTION RESPIRATORY (INHALATION) at 08:55

## 2020-01-01 RX ADMIN — Medication 10 ML: at 21:22

## 2020-01-01 RX ADMIN — ALBUTEROL SULFATE 1 PUFF: 90 AEROSOL, METERED RESPIRATORY (INHALATION) at 08:14

## 2020-01-01 RX ADMIN — HEPARIN SODIUM 10000 UNITS: 1000 INJECTION INTRAVENOUS; SUBCUTANEOUS at 08:58

## 2020-01-01 RX ADMIN — ACETAMINOPHEN 650 MG: 325 TABLET ORAL at 17:49

## 2020-01-01 RX ADMIN — IPRATROPIUM BROMIDE AND ALBUTEROL SULFATE 1 AMPULE: .5; 3 SOLUTION RESPIRATORY (INHALATION) at 20:32

## 2020-01-01 RX ADMIN — IPRATROPIUM BROMIDE AND ALBUTEROL SULFATE 1 AMPULE: .5; 3 SOLUTION RESPIRATORY (INHALATION) at 16:04

## 2020-01-01 RX ADMIN — CELECOXIB 200 MG: 100 CAPSULE ORAL at 10:26

## 2020-01-01 RX ADMIN — HYDRALAZINE HYDROCHLORIDE 10 MG: 20 INJECTION INTRAMUSCULAR; INTRAVENOUS at 10:52

## 2020-01-01 RX ADMIN — FLUTICASONE PROPIONATE 1 PUFF: 220 AEROSOL, METERED RESPIRATORY (INHALATION) at 08:42

## 2020-01-01 RX ADMIN — CELECOXIB 200 MG: 100 CAPSULE ORAL at 08:27

## 2020-01-01 RX ADMIN — IPRATROPIUM BROMIDE AND ALBUTEROL SULFATE 1 AMPULE: .5; 3 SOLUTION RESPIRATORY (INHALATION) at 11:06

## 2020-01-01 RX ADMIN — HEPARIN SODIUM 5000 UNITS: 5000 INJECTION INTRAVENOUS; SUBCUTANEOUS at 14:43

## 2020-01-01 RX ADMIN — APIXABAN 2.5 MG: 2.5 TABLET, FILM COATED ORAL at 12:44

## 2020-01-01 RX ADMIN — IPRATROPIUM BROMIDE AND ALBUTEROL SULFATE 1 AMPULE: .5; 3 SOLUTION RESPIRATORY (INHALATION) at 07:35

## 2020-01-01 RX ADMIN — NITROGLYCERIN 20 MCG/MIN: 20 INJECTION INTRAVENOUS at 11:44

## 2020-01-01 RX ADMIN — IPRATROPIUM BROMIDE AND ALBUTEROL SULFATE 1 AMPULE: .5; 3 SOLUTION RESPIRATORY (INHALATION) at 16:03

## 2020-01-01 RX ADMIN — IPRATROPIUM BROMIDE AND ALBUTEROL SULFATE 1 AMPULE: .5; 3 SOLUTION RESPIRATORY (INHALATION) at 14:45

## 2020-01-01 RX ADMIN — SODIUM CHLORIDE: 9 INJECTION, SOLUTION INTRAVENOUS at 06:19

## 2020-01-01 RX ADMIN — NITROGLYCERIN 50 MCG/MIN: 20 INJECTION INTRAVENOUS at 21:00

## 2020-01-01 RX ADMIN — AMLODIPINE BESYLATE 10 MG: 10 TABLET ORAL at 10:20

## 2020-01-01 RX ADMIN — BACITRACIN: 500 OINTMENT TOPICAL at 15:27

## 2020-01-01 RX ADMIN — BACITRACIN: 500 OINTMENT TOPICAL at 21:02

## 2020-01-01 RX ADMIN — METOPROLOL TARTRATE 12.5 MG: 25 TABLET ORAL at 10:26

## 2020-01-01 RX ADMIN — CALCIUM 1250 MG: 500 TABLET ORAL at 08:13

## 2020-01-01 RX ADMIN — SODIUM CHLORIDE, PRESERVATIVE FREE 10 ML: 5 INJECTION INTRAVENOUS at 20:53

## 2020-01-01 RX ADMIN — FUROSEMIDE 40 MG: 10 INJECTION, SOLUTION INTRAMUSCULAR; INTRAVENOUS at 08:38

## 2020-01-01 RX ADMIN — HYDRALAZINE HYDROCHLORIDE 10 MG: 20 INJECTION INTRAMUSCULAR; INTRAVENOUS at 18:14

## 2020-01-01 RX ADMIN — MINERAL OIL, PETROLATUM, PHENYLEPHRINE HCL: 14; 74.9; .25 OINTMENT RECTAL at 16:30

## 2020-01-01 RX ADMIN — METHYLPREDNISOLONE SODIUM SUCCINATE 40 MG: 40 INJECTION, POWDER, FOR SOLUTION INTRAMUSCULAR; INTRAVENOUS at 08:54

## 2020-01-01 RX ADMIN — SODIUM CHLORIDE, PRESERVATIVE FREE 10 ML: 5 INJECTION INTRAVENOUS at 08:30

## 2020-01-01 RX ADMIN — LIDOCAINE HYDROCHLORIDE 50 MG: 10 INJECTION, SOLUTION EPIDURAL; INFILTRATION; INTRACAUDAL; PERINEURAL at 14:12

## 2020-01-01 RX ADMIN — SODIUM CHLORIDE, PRESERVATIVE FREE 10 ML: 5 INJECTION INTRAVENOUS at 09:45

## 2020-01-01 RX ADMIN — METOPROLOL TARTRATE 25 MG: 25 TABLET ORAL at 08:38

## 2020-01-01 RX ADMIN — IPRATROPIUM BROMIDE AND ALBUTEROL SULFATE 1 AMPULE: .5; 3 SOLUTION RESPIRATORY (INHALATION) at 16:50

## 2020-01-01 RX ADMIN — Medication 10 ML: at 08:38

## 2020-01-01 RX ADMIN — FUROSEMIDE 20 MG: 10 INJECTION, SOLUTION INTRAMUSCULAR; INTRAVENOUS at 08:36

## 2020-01-01 RX ADMIN — GLYCOPYRROLATE AND FORMOTEROL FUMARATE 1 PUFF: 9; 4.8 AEROSOL, METERED RESPIRATORY (INHALATION) at 08:14

## 2020-01-01 RX ADMIN — FUROSEMIDE 40 MG: 10 INJECTION, SOLUTION INTRAMUSCULAR; INTRAVENOUS at 17:14

## 2020-01-01 RX ADMIN — NITROGLYCERIN 100 MCG/MIN: 20 INJECTION INTRAVENOUS at 13:24

## 2020-01-01 RX ADMIN — ISOSORBIDE MONONITRATE 60 MG: 60 TABLET ORAL at 08:12

## 2020-01-01 RX ADMIN — HYDRALAZINE HYDROCHLORIDE 25 MG: 25 TABLET ORAL at 22:47

## 2020-01-01 RX ADMIN — CLOPIDOGREL 75 MG: 75 TABLET, FILM COATED ORAL at 08:17

## 2020-01-01 RX ADMIN — FENTANYL CITRATE 25 MCG: 50 INJECTION INTRAMUSCULAR; INTRAVENOUS at 09:18

## 2020-01-01 RX ADMIN — GUAIFENESIN 600 MG: 600 TABLET, EXTENDED RELEASE ORAL at 20:21

## 2020-01-01 RX ADMIN — PANTOPRAZOLE SODIUM 40 MG: 40 TABLET, DELAYED RELEASE ORAL at 05:05

## 2020-01-01 RX ADMIN — FUROSEMIDE 20 MG: 10 INJECTION, SOLUTION INTRAMUSCULAR; INTRAVENOUS at 21:01

## 2020-01-01 RX ADMIN — FLUTICASONE PROPIONATE 1 PUFF: 220 AEROSOL, METERED RESPIRATORY (INHALATION) at 08:14

## 2020-01-01 RX ADMIN — FUROSEMIDE 20 MG: 10 INJECTION, SOLUTION INTRAMUSCULAR; INTRAVENOUS at 17:50

## 2020-01-01 RX ADMIN — SODIUM CHLORIDE, PRESERVATIVE FREE 10 ML: 5 INJECTION INTRAVENOUS at 08:38

## 2020-01-01 RX ADMIN — GLYCOPYRROLATE AND FORMOTEROL FUMARATE 1 PUFF: 9; 4.8 AEROSOL, METERED RESPIRATORY (INHALATION) at 08:29

## 2020-01-01 RX ADMIN — SALINE NASAL SPRAY 2 SPRAY: 1.5 SOLUTION NASAL at 15:27

## 2020-01-01 RX ADMIN — HEPARIN SODIUM 5000 UNITS: 5000 INJECTION INTRAVENOUS; SUBCUTANEOUS at 05:41

## 2020-01-01 RX ADMIN — ALBUTEROL SULFATE 2.5 MG: 2.5 SOLUTION RESPIRATORY (INHALATION) at 03:43

## 2020-01-01 RX ADMIN — METOPROLOL TARTRATE 25 MG: 25 TABLET ORAL at 08:36

## 2020-01-01 RX ADMIN — BACITRACIN: 500 OINTMENT TOPICAL at 17:48

## 2020-01-01 RX ADMIN — PANTOPRAZOLE SODIUM 40 MG: 40 INJECTION, POWDER, FOR SOLUTION INTRAVENOUS at 20:52

## 2020-01-01 RX ADMIN — METHYLPREDNISOLONE SODIUM SUCCINATE 40 MG: 40 INJECTION, POWDER, FOR SOLUTION INTRAMUSCULAR; INTRAVENOUS at 08:38

## 2020-01-01 RX ADMIN — SALINE NASAL SPRAY 2 SPRAY: 1.5 SOLUTION NASAL at 14:40

## 2020-01-01 RX ADMIN — BACITRACIN: 500 OINTMENT TOPICAL at 21:01

## 2020-01-01 RX ADMIN — FUROSEMIDE 40 MG: 10 INJECTION, SOLUTION INTRAMUSCULAR; INTRAVENOUS at 08:46

## 2020-01-01 RX ADMIN — BACITRACIN: 500 OINTMENT TOPICAL at 20:52

## 2020-01-01 RX ADMIN — LOSARTAN POTASSIUM 50 MG: 50 TABLET, FILM COATED ORAL at 10:17

## 2020-01-01 RX ADMIN — SODIUM CHLORIDE: 9 INJECTION, SOLUTION INTRAVENOUS at 14:12

## 2020-01-01 RX ADMIN — Medication 10 MG: at 13:26

## 2020-01-01 RX ADMIN — CLOPIDOGREL 75 MG: 75 TABLET, FILM COATED ORAL at 10:20

## 2020-01-01 RX ADMIN — GUAIFENESIN 600 MG: 600 TABLET, EXTENDED RELEASE ORAL at 08:12

## 2020-01-01 RX ADMIN — PHENYLEPHRINE HYDROCHLORIDE 100 MCG: 10 INJECTION INTRAVENOUS at 09:08

## 2020-01-01 RX ADMIN — SODIUM CHLORIDE 20 ML: 0.9 INJECTION, SOLUTION INTRAVENOUS at 06:27

## 2020-01-01 RX ADMIN — ALBUTEROL SULFATE 2 PUFF: 90 AEROSOL, METERED RESPIRATORY (INHALATION) at 05:01

## 2020-01-01 RX ADMIN — METOPROLOL TARTRATE 37.5 MG: 25 TABLET ORAL at 08:12

## 2020-01-01 RX ADMIN — NITROGLYCERIN 100 MCG/MIN: 20 INJECTION INTRAVENOUS at 07:47

## 2020-01-01 RX ADMIN — SODIUM CHLORIDE, PRESERVATIVE FREE 10 ML: 5 INJECTION INTRAVENOUS at 10:18

## 2020-01-01 RX ADMIN — HYDRALAZINE HYDROCHLORIDE 50 MG: 50 TABLET, FILM COATED ORAL at 14:07

## 2020-01-01 RX ADMIN — ISOSORBIDE MONONITRATE 30 MG: 30 TABLET ORAL at 15:28

## 2020-01-01 RX ADMIN — HYDRALAZINE HYDROCHLORIDE 25 MG: 25 TABLET, FILM COATED ORAL at 11:24

## 2020-01-01 RX ADMIN — CALCIUM 1250 MG: 500 TABLET ORAL at 08:18

## 2020-01-01 RX ADMIN — FLUTICASONE PROPIONATE 1 PUFF: 220 AEROSOL, METERED RESPIRATORY (INHALATION) at 09:06

## 2020-01-01 RX ADMIN — SALINE NASAL SPRAY 2 SPRAY: 1.5 SOLUTION NASAL at 17:46

## 2020-01-01 RX ADMIN — NITROGLYCERIN 80 MCG/MIN: 20 INJECTION INTRAVENOUS at 09:21

## 2020-01-01 RX ADMIN — BACITRACIN: 500 OINTMENT TOPICAL at 08:13

## 2020-01-01 RX ADMIN — LIDOCAINE HYDROCHLORIDE 1 ML: 10 INJECTION, SOLUTION INFILTRATION; PERINEURAL at 08:07

## 2020-01-01 RX ADMIN — DESMOPRESSIN ACETATE 10 MG: 0.2 TABLET ORAL at 20:52

## 2020-01-01 RX ADMIN — SODIUM CHLORIDE 10 ML: 9 INJECTION, SOLUTION INTRAMUSCULAR; INTRAVENOUS; SUBCUTANEOUS at 10:13

## 2020-01-01 RX ADMIN — FLUTICASONE PROPIONATE 1 PUFF: 220 AEROSOL, METERED RESPIRATORY (INHALATION) at 08:32

## 2020-01-01 RX ADMIN — FLUTICASONE PROPIONATE 1 PUFF: 220 AEROSOL, METERED RESPIRATORY (INHALATION) at 21:27

## 2020-01-01 RX ADMIN — METHYLPREDNISOLONE SODIUM SUCCINATE 40 MG: 40 INJECTION, POWDER, FOR SOLUTION INTRAMUSCULAR; INTRAVENOUS at 10:18

## 2020-01-01 RX ADMIN — HYDRALAZINE HYDROCHLORIDE 10 MG: 20 INJECTION INTRAMUSCULAR; INTRAVENOUS at 02:37

## 2020-01-01 RX ADMIN — APIXABAN 2.5 MG: 2.5 TABLET, FILM COATED ORAL at 08:12

## 2020-01-01 RX ADMIN — HYDROCHLOROTHIAZIDE 12.5 MG: 12.5 CAPSULE ORAL at 10:26

## 2020-01-01 RX ADMIN — ISOSORBIDE MONONITRATE 30 MG: 30 TABLET ORAL at 08:17

## 2020-01-01 RX ADMIN — FUROSEMIDE 40 MG: 10 INJECTION, SOLUTION INTRAMUSCULAR; INTRAVENOUS at 21:22

## 2020-01-01 RX ADMIN — CELECOXIB 200 MG: 100 CAPSULE ORAL at 21:09

## 2020-01-01 RX ADMIN — IPRATROPIUM BROMIDE AND ALBUTEROL SULFATE 1 AMPULE: .5; 3 SOLUTION RESPIRATORY (INHALATION) at 20:15

## 2020-01-01 RX ADMIN — ALBUTEROL SULFATE 1.25 MG: 2.5 SOLUTION RESPIRATORY (INHALATION) at 11:35

## 2020-01-01 RX ADMIN — FUROSEMIDE 40 MG: 40 TABLET ORAL at 08:12

## 2020-01-01 RX ADMIN — FLUTICASONE PROPIONATE 1 PUFF: 220 AEROSOL, METERED RESPIRATORY (INHALATION) at 08:56

## 2020-01-01 RX ADMIN — METOPROLOL TARTRATE 37.5 MG: 25 TABLET ORAL at 20:58

## 2020-01-01 RX ADMIN — SALINE NASAL SPRAY 2 SPRAY: 1.5 SOLUTION NASAL at 08:13

## 2020-01-01 RX ADMIN — IPRATROPIUM BROMIDE AND ALBUTEROL SULFATE 1 AMPULE: .5; 3 SOLUTION RESPIRATORY (INHALATION) at 16:35

## 2020-01-01 RX ADMIN — SALINE NASAL SPRAY 2 SPRAY: 1.5 SOLUTION NASAL at 08:20

## 2020-01-01 RX ADMIN — FLUTICASONE PROPIONATE 1 PUFF: 220 AEROSOL, METERED RESPIRATORY (INHALATION) at 20:19

## 2020-01-01 RX ADMIN — SODIUM CHLORIDE, PRESERVATIVE FREE 10 ML: 5 INJECTION INTRAVENOUS at 08:21

## 2020-01-01 RX ADMIN — METOPROLOL TARTRATE 12.5 MG: 25 TABLET ORAL at 21:08

## 2020-01-01 RX ADMIN — FLUTICASONE PROPIONATE 1 PUFF: 220 AEROSOL, METERED RESPIRATORY (INHALATION) at 20:40

## 2020-01-01 RX ADMIN — FENTANYL CITRATE 25 MCG: 50 INJECTION INTRAMUSCULAR; INTRAVENOUS at 08:35

## 2020-01-01 RX ADMIN — ISOSORBIDE MONONITRATE 60 MG: 60 TABLET ORAL at 10:15

## 2020-01-01 RX ADMIN — HYDROCHLOROTHIAZIDE 12.5 MG: 12.5 CAPSULE ORAL at 08:28

## 2020-01-01 RX ADMIN — CALCIUM 1250 MG: 500 TABLET ORAL at 20:19

## 2020-01-01 RX ADMIN — LOSARTAN POTASSIUM 50 MG: 50 TABLET, FILM COATED ORAL at 08:12

## 2020-01-01 RX ADMIN — METOPROLOL TARTRATE 37.5 MG: 25 TABLET ORAL at 20:21

## 2020-01-01 RX ADMIN — SODIUM CHLORIDE: 9 INJECTION, SOLUTION INTRAVENOUS at 11:32

## 2020-01-01 RX ADMIN — PANTOPRAZOLE SODIUM 40 MG: 40 INJECTION, POWDER, FOR SOLUTION INTRAVENOUS at 13:30

## 2020-01-01 RX ADMIN — PROTAMINE SULFATE 5 MG: 10 INJECTION, SOLUTION INTRAVENOUS at 09:18

## 2020-01-01 RX ADMIN — SODIUM CHLORIDE: 9 INJECTION, SOLUTION INTRAVENOUS at 09:55

## 2020-01-01 RX ADMIN — PHENYLEPHRINE HYDROCHLORIDE 50 MCG: 10 INJECTION INTRAVENOUS at 08:42

## 2020-01-01 RX ADMIN — FENTANYL CITRATE 25 MCG: 50 INJECTION INTRAMUSCULAR; INTRAVENOUS at 08:14

## 2020-01-01 RX ADMIN — IPRATROPIUM BROMIDE AND ALBUTEROL SULFATE 1 AMPULE: .5; 3 SOLUTION RESPIRATORY (INHALATION) at 08:01

## 2020-01-01 RX ADMIN — MINERAL OIL, PETROLATUM, PHENYLEPHRINE HCL: 14; 74.9; .25 OINTMENT RECTAL at 08:20

## 2020-01-01 RX ADMIN — CLOPIDOGREL 75 MG: 75 TABLET, FILM COATED ORAL at 10:26

## 2020-01-01 RX ADMIN — ATORVASTATIN CALCIUM 10 MG: 10 TABLET, FILM COATED ORAL at 17:58

## 2020-01-01 RX ADMIN — HYDRALAZINE HYDROCHLORIDE 50 MG: 50 TABLET, FILM COATED ORAL at 20:20

## 2020-01-01 ASSESSMENT — PAIN SCALES - GENERAL
PAINLEVEL_OUTOF10: 0
PAINLEVEL_OUTOF10: 0
PAINLEVEL_OUTOF10: 10
PAINLEVEL_OUTOF10: 0
PAINLEVEL_OUTOF10: 0
PAINLEVEL_OUTOF10: 10
PAINLEVEL_OUTOF10: 0
PAINLEVEL_OUTOF10: 0
PAINLEVEL_OUTOF10: 6
PAINLEVEL_OUTOF10: 0
PAINLEVEL_OUTOF10: 10
PAINLEVEL_OUTOF10: 0
PAINLEVEL_OUTOF10: 6
PAINLEVEL_OUTOF10: 8
PAINLEVEL_OUTOF10: 0
PAINLEVEL_OUTOF10: 8
PAINLEVEL_OUTOF10: 0
PAINLEVEL_OUTOF10: 0
PAINLEVEL_OUTOF10: 3
PAINLEVEL_OUTOF10: 10
PAINLEVEL_OUTOF10: 0
PAINLEVEL_OUTOF10: 10
PAINLEVEL_OUTOF10: 0
PAINLEVEL_OUTOF10: 0
PAINLEVEL_OUTOF10: 4
PAINLEVEL_OUTOF10: 0
PAINLEVEL_OUTOF10: 0

## 2020-01-01 ASSESSMENT — PULMONARY FUNCTION TESTS
PIF_VALUE: 1
PIF_VALUE: 0
PIF_VALUE: 1
PIF_VALUE: 0
PIF_VALUE: 1
PIF_VALUE: 0
PIF_VALUE: 1
PIF_VALUE: 0
PIF_VALUE: 1
PIF_VALUE: 1
PIF_VALUE: 0
PIF_VALUE: 1
PIF_VALUE: 0
PIF_VALUE: 1
PIF_VALUE: 0
PIF_VALUE: 1
PIF_VALUE: 0
PIF_VALUE: 0
PIF_VALUE: 1
PIF_VALUE: 0
PIF_VALUE: 1
PIF_VALUE: 0
PIF_VALUE: 1
PEFR_L/MIN: 23
PIF_VALUE: 1
PIF_VALUE: 0
PIF_VALUE: 1
PIF_VALUE: 0
PIF_VALUE: 1
PIF_VALUE: 1

## 2020-01-01 ASSESSMENT — ENCOUNTER SYMPTOMS
RHINORRHEA: 0
SHORTNESS OF BREATH: 1
VOMITING: 0
CHEST TIGHTNESS: 0
COUGH: 1
SHORTNESS OF BREATH: 0
PHOTOPHOBIA: 0
COUGH: 0
NAUSEA: 0
EYE DISCHARGE: 0
ABDOMINAL DISTENTION: 0
WHEEZING: 0
BACK PAIN: 0
ABDOMINAL PAIN: 0

## 2020-01-01 ASSESSMENT — PAIN DESCRIPTION - LOCATION
LOCATION: FOOT
LOCATION: FOOT
LOCATION: KNEE
LOCATION: FOOT

## 2020-01-01 ASSESSMENT — PAIN DESCRIPTION - PAIN TYPE
TYPE: ACUTE PAIN

## 2020-01-01 ASSESSMENT — PAIN SCALES - WONG BAKER: WONGBAKER_NUMERICALRESPONSE: 4

## 2020-01-01 ASSESSMENT — PAIN - FUNCTIONAL ASSESSMENT: PAIN_FUNCTIONAL_ASSESSMENT: 0-10

## 2020-01-01 ASSESSMENT — PAIN DESCRIPTION - ORIENTATION
ORIENTATION: RIGHT
ORIENTATION: LEFT
ORIENTATION: LEFT

## 2020-01-01 ASSESSMENT — PAIN DESCRIPTION - DESCRIPTORS
DESCRIPTORS: ACHING;DISCOMFORT
DESCRIPTORS: ACHING;DISCOMFORT;SORE;TENDER

## 2020-01-01 ASSESSMENT — COPD QUESTIONNAIRES: CAT_SEVERITY: NO INTERVAL CHANGE

## 2020-01-01 ASSESSMENT — PAIN DESCRIPTION - FREQUENCY: FREQUENCY: INTERMITTENT

## 2020-01-28 NOTE — PROGRESS NOTES
graft     Edema     History of tobacco abuse     Hyperlipidemia     Hypertension     Hypertension     Osteoarthritis     PVD (peripheral vascular disease) (HCC)     Superficial thrombosis of leg     Unspecified sinusitis (chronic)     Unspecified urinary incontinence     Unspecified vitamin D deficiency        Past Surgical History:   Procedure Laterality Date    APPENDECTOMY      CARDIAC CATHETERIZATION  10/14/2019    COLONOSCOPY      EYE SURGERY Bilateral 12/2015    cataracts/Jeramie Senior    HYSTERECTOMY, VAGINAL  8/13/1974    JOINT REPLACEMENT Right     Knee    MASTECTOMY Left     SHOULDER ARTHROPLASTY Left 2016       Current Outpatient Medications   Medication Sig Dispense Refill    clopidogrel (PLAVIX) 75 MG tablet Take 1 tablet by mouth daily 30 tablet 3    furosemide (LASIX) 40 MG tablet Take 40 mg by mouth daily      celecoxib (CELEBREX) 200 MG capsule Take 200 mg by mouth daily      FLOVENT  MCG/ACT inhaler       atorvastatin (LIPITOR) 10 MG tablet Take 10 mg by mouth daily.  warfarin (COUMADIN) 4 MG tablet Take 4 mg by mouth daily.  losartan-hydrochlorothiazide (HYZAAR) 50-12.5 MG per tablet Take 1 tablet by mouth daily.  montelukast (SINGULAIR) 10 MG tablet Take 10 mg by mouth nightly.  metoprolol tartrate (LOPRESSOR) 25 MG tablet Take 0.5 tablets by mouth 2 times daily (Patient not taking: Reported on 1/28/2020) 60 tablet 3    albuterol (ACCUNEB) 1.25 MG/3ML nebulizer solution Inhale 1 ampule into the lungs every 6 hours as needed for Wheezing      ferrous sulfate 325 (65 Fe) MG tablet Take 325 mg by mouth daily (with breakfast)      PROVENTIL  (90 BASE) MCG/ACT inhaler       NONFORMULARY daily. VITAMIN D3 1000 units/CALCIUM 1200 units.  Cholecalciferol (VITAMIN D3) 1000 UNITS TABS Take  by mouth daily.  Calcium Carbonate-Vit D-Min (CALCIUM 1200 PO) Take  by mouth daily.  Acetaminophen (TYLENOL PO) Take  by mouth as needed. negative  Allergy and Immunology ROS: negative  Hematological and Lymphatic ROS: negative  Endocrine ROS: negative  Respiratory ROS: no cough, shortness of breath, or wheezing  Cardiovascular ROS: no chest pain or dyspnea on exertion  Gastrointestinal ROS: per HPI  Genito-Urinary ROS: no dysuria, trouble voiding, or hematuria  Musculoskeletal ROS: negative      Objective   Vitals:    01/28/20 1105   BP: 131/80   Pulse: 73   Temp: 99 °F (37.2 °C)     General:in no apparent distress, well developed and well nourished, alert and oriented times 3  Eyes: No gross abnormalities. Ears, Nose, Throat: hearing grossly normal bilaterally  Neck: neck supple and non tender without mass  Lungs: clear to auscultation without wheezes or rales   Heart: S1S2, no mumurs, RRR  Abdomen: soft, nontender, no HSM, no guarding, no rebound, no masses  Extremity: 2+ pitting edema bilaterally  Neuro: CN II-XII grossly intact      Assessment     3  81-year-old female with 1 year history of worsening rectal bleeding with associated weight loss      Plan     1. At this point the patient is extremely high risk for any sort of anesthetic. She currently does have COPD and also has severe aortic stenosis and from appearance on exam today seems to have some ongoing heart failure. Her GI bleed is concerning and certainly I do think that she will need endoscopic evaluation at least with colonoscopy. However due to her significant medical history and her ongoing active heart issues this would make her extremely high risk for even monitored anesthesia care. I did discuss with her that the procedure as well as anesthesia would put her at significantly increased risk of stroke and/or heart attack. With that said I think that the safest option would be to have her get the colonoscopy at a tertiary care center where if there were complications during the case that they would be more equipped to handle.   I have made referral to general surgery in

## 2020-02-21 NOTE — TELEPHONE ENCOUNTER
[facility] spoke with pharmacist   Goldie Sheriff in Miralax 17 g daily qty 1 bottle no refills under Dr. Glroy Francois. I'm mailing bowel first class. Insurance won't pay for Miralax unless there is a sig so that's why the rx will say 17 g daily.

## 2020-02-27 NOTE — ANESTHESIA PRE PROCEDURE
Historical Provider, MD       Current medications:    No current facility-administered medications for this encounter. Allergies:  No Known Allergies    Problem List:    Patient Active Problem List   Diagnosis Code    PVD (peripheral vascular disease) ([facility] I73.9    Dermatophytosis of nail B35.1    Corns and callosities L84    Venous insufficiency (chronic) (peripheral) I87.2    HT (hammer toe) M20.40    Asymptomatic bilateral carotid artery stenosis I65.23    Left leg swelling M79.89    Cellulitis of left leg L03. 80    S/P angioplasty with stent Z95.820       Past Medical History:        Diagnosis Date    Anemia     Aortic valve disease 01/20/2020    dr Bandar Vasquez     or 3/20/20     Asthma     Backache, unspecified     Blood in stool     Breast cancer, left (HCC)     left    Chronic venous insufficiency     [facility]     Coronary atherosclerosis of unspecified type of vessel, native or graft     dr Bandar Vasquez    Dry skin     itching    Edema     Nevarez catheter present     Full dentures     History of blood transfusion 01/20/2020    2 units blood rectal bleeding    Hyperlipidemia     Hypertension     Impaired mobility     cane    Loss of weight     MI (myocardial infarction) ([facility] [address] [facility]     x2    Osteoarthritis     Poor appetite     PVD (peripheral vascular disease) ([facility])     Superficial thrombosis of leg     Unspecified sinusitis (chronic)     Unspecified urinary incontinence     Unspecified vitamin D deficiency     Wears glasses        Past Surgical History:        Procedure Laterality Date    APPENDECTOMY      CARDIAC CATHETERIZATION  10/14/2019    COLONOSCOPY      EYE SURGERY Bilateral 12/2015    cataracts/Jeramie Senior    HYSTERECTOMY, VAGINAL  8/13/1974    JOINT REPLACEMENT Right     Knee    MASTECTOMY Left     SHOULDER ARTHROPLASTY Left 2016       Social History:    Social History     Tobacco Use    Smoking status: Current Every

## 2020-02-27 NOTE — BRIEF OP NOTE
Brief Postoperative Note  ______________________________________________________________    Patient: Johnnie Lawler  YOB: 1940  MRN: [id number]  Date of Procedure: 2/27/2020    Pre-Op Diagnosis: RECTAL BLEEDING    Post-Op Diagnosis: Same       Procedure(s):  SIGMOIDOSCOPY DIAGNOSTIC FLEXIBLE    Anesthesia: Monitor Anesthesia Care    Surgeon(s):  Flako Pineda IV, DO    Assistant: Estefania Ybarra, PGY 5    Estimated Blood Loss (mL): less than 5 mL    Complications: Other: unable to pass scope past 30 cm     Specimens:   * No specimens in log *    Implants:  * No implants in log *      Drains:   Urethral Catheter Non-latex (Active)       Findings: Wound Class IV; unable to pass scope past 30 cm. Will order barium enema.      Brianna Card DO  Date: 2/27/2020  Time: 2:47 PM

## 2020-02-27 NOTE — H&P
[address] [facility]  History and Physical      Pt Name: Julio Adkins  MRN: [id number]  YOB: 1940  Date of evaluation: 2/27/2020      I have examined the patient and reviewed the H&P/Consult completed above, and there are no changes to the patient or plans. No significant changes since clinic visit. General: awake and alert. [place]  HEENT: NCAT  Lungs: resp even and unlabored  Heart: RRR  Abd: soft, non-distended, non-tender  Ext: no cyanosis or edema    A/P:  66 y/o female with rectal bleeding    - diagnostic colonoscopy and plan for discharge post procedure    Electronically signed by Chanelle Anderson DO on 2/27/2020 at 12:40 PM        Chief Complaint   Patient presents with    Rectal Bleeding         HxCC:  66 y/o female with hx of blood in stool referred to me by general surgery in [place]. The surgeon determined the patient was at high risk for anesthetic due to COPD and aortic stenosis and would be better suited to have the procedure done at [facility]. Patient states for approximately the past year she has noticed intermittent rectal bleeding which she states in the past is stained toilet paper when she wipes. Patient is on plavix and coumadin. Denies abdominal pain.  Denies fevers, chills, or sweats.           Vitals:     02/03/20 1135   Pulse: 68   Resp: 14   SpO2: 94%             Patient Active Problem List   Diagnosis    [facility]) [facility] [address] [facility]    Dermatophytosis of nail    Corns and callosities    Venous insufficiency (chronic) (peripheral)    HT (hammer toe)    Asymptomatic bilateral carotid artery stenosis    Left leg swelling    Cellulitis of left leg    S/P angioplasty with stent         Current Facility-Administered Medications          Current Outpatient Medications   Medication Sig Dispense Refill    warfarin (COUMADIN) 1 MG tablet Take 1 mg by mouth daily monday        hydrochlorothiazide (MICROZIDE) 12.5 MG capsule Take

## 2020-02-27 NOTE — OP NOTE
Operative Note  ______________________________________________________________    Patient: Omar Parnell  YOB: 1940  MRN: [id number]  Date of Procedure: 2/27/2020    Pre-Op Diagnosis: RECTAL BLEEDING    Post-Op Diagnosis: Same; hyperplastic polyp       Procedure(s):  SIGMOIDOSCOPY DIAGNOSTIC FLEXIBLE    Anesthesia: Monitor Anesthesia Care    Surgeon(s):  Mustapha Pineda IV, DO    Assistant: Arti Olsen, PGY 5    Estimated Blood Loss (mL): less than 5 mL    Complications: Other: unable to pass scope past 30 cm     Specimens:   * No specimens in log *    Implants:  * No implants in log *      Drains:   Urethral Catheter Non-latex (Active)       Findings: Wound Class IV; unable to pass scope past 30 cm. Will order barium enema. No biopsy of polyp due to need for enema. Indications: This is a 68 y/o female who was referred to the clinic from General Surgery in [place] for colonoscopy due to her h/o aortic stenosis, COPD, and use of blood thinners. She had previous complaint of rectal bleeding. The risks and benefits of the procedure were discussed in detail and signed consent was obtained. Operative Details: The patient was given IV conscious sedation per anesthesia. The patient was given supplemental O2 via nasal cannula. The patient's SPO2 remained above 90% throughout the procedure. The colonoscope was inserted per rectum and advanced. Her rectum and sigmoid colon was very tortuous likely due to her previous hysterectomy. The colonoscope was only advanced to about 20 cm. There were several diverticula seen and there was a hyperplastic polyp noted. It was not biopsied due to need for barium enema to evaluate the rest of her colon. IMPRESSION/PLAN:   1. Incomplete colonoscopy, will obtain barium enema  2.  Follow up in clinic in 2 weeks, will need repeat flex sig for biopsy of hyperplastic polyp          Ba Foreman DO  Date: 2/27/2020  Time: 4:15 PM

## 2020-02-28 NOTE — ANESTHESIA POSTPROCEDURE EVALUATION
Department of Anesthesiology  Postprocedure Note    Patient: Donna De La Garza  MRN: [id number]  YOB: 1940  Date of evaluation: 2/28/2020  Time:  12:02 AM     Procedure Summary     Date:  02/27/20 Room / Location:  [address]    Anesthesia Start:  6453 Anesthesia Stop:  4111    Procedure:  [address] [facility] (N/A ) Diagnosis:  (RECTAL BLEEDING)    Surgeon:  Rebecca Rose IV, DO Responsible Provider:  Madhu Christianson MD    Anesthesia Type:  MAC ASA Status:  3          Anesthesia Type: MAC    Connie Phase I:      Connie Phase II: Connie Score: 10    Last vitals: Reviewed and per EMR flowsheets.    POST-OP ANESTHESIA NOTE       BP (!) 160/72   Pulse 71   Temp 99.5 °F (37.5 °C) (Infrared)   Resp 22   Ht 5' (1.524 m)   Wt 100 lb (45.4 kg)   LMP  (LMP Unknown)   SpO2 93%   BMI 19.53 kg/m²    Pain Assessment: 0-10  Pain Level: 0         Anesthesia Post Evaluation    Patient location during evaluation: PACU  Patient participation: complete - patient participated  Level of consciousness: awake  Pain score: 0  Airway patency: patent  Nausea & Vomiting: no nausea and no vomiting  Complications: no  Cardiovascular status: hemodynamically stable  Respiratory status: acceptable

## 2020-03-09 NOTE — TELEPHONE ENCOUNTER
Patient calling in because she had a colonoscopy done on 2/27/2020 and she would like the results, please advise.

## 2020-03-18 NOTE — PROGRESS NOTES
Following Dr. Marychuy Candelario of care:    Changed 16 FR catheter changed without difficulty. Once balloon was deflated, removed catheter in total without difficulty. Patient's vagina was cleansed with betadine. 16FR  ruiz was inserted without difficulty. Upon urine return, balloon inflated with 10cc sterile water. Ruiz catheter was hooked up to leg bag with straps. Patient instructed on catheter care including draining catheter bag and keeping catheter bag above the knee to prevent pulling on catheter causing blood.

## 2020-06-08 PROBLEM — I35.0 SEVERE AORTIC STENOSIS: Status: ACTIVE | Noted: 2020-01-01

## 2020-06-08 PROBLEM — Z95.5 PRESENCE OF STENT IN CORONARY ARTERY IN PATIENT WITH CORONARY ARTERY DISEASE: Status: ACTIVE | Noted: 2020-01-01

## 2020-06-08 PROBLEM — I25.10 PRESENCE OF STENT IN CORONARY ARTERY IN PATIENT WITH CORONARY ARTERY DISEASE: Status: ACTIVE | Noted: 2020-01-01

## 2020-06-08 PROBLEM — F17.200 SMOKER UNMOTIVATED TO QUIT: Status: ACTIVE | Noted: 2020-01-01

## 2020-06-08 PROBLEM — J44.9 STAGE 3 SEVERE COPD BY GOLD CLASSIFICATION (HCC): Status: ACTIVE | Noted: 2020-01-01

## 2020-06-08 NOTE — PROGRESS NOTES
Provider   umeclidinium-vilanterol (ANORO ELLIPTA) 62.5-25 MCG/INH AEPB inhaler Inhale 1 puff into the lungs daily 6/3/20  Yes Korey Ramachandran MD   warfarin (COUMADIN) 1 MG tablet Take 1 mg by mouth daily monday   Yes Historical Provider, MD   hydrochlorothiazide (MICROZIDE) 12.5 MG capsule Take 12.5 mg by mouth daily   Yes Historical Provider, MD   losartan (COZAAR) 50 MG tablet Take 50 mg by mouth daily   Yes Historical Provider, MD   metoprolol tartrate (LOPRESSOR) 25 MG tablet Take 0.5 tablets by mouth 2 times daily 10/15/19  Yes MASON Meadows CNP   clopidogrel (PLAVIX) 75 MG tablet Take 1 tablet by mouth daily 10/16/19  Yes MASON Meadows CNP   furosemide (LASIX) 40 MG tablet Take 80 mg by mouth 2 times daily    Yes Historical Provider, MD   albuterol (ACCUNEB) 1.25 MG/3ML nebulizer solution Inhale 1 ampule into the lungs every 6 hours as needed for Wheezing   Yes Historical Provider, MD   celecoxib (CELEBREX) 200 MG capsule Take 200 mg by mouth daily   Yes Historical Provider, MD   FLOVENT  MCG/ACT inhaler Inhale 1 puff into the lungs 2 times daily  7/28/15  Yes Historical Provider, MD   PROVENTIL  (90 BASE) MCG/ACT inhaler Inhale 1 puff into the lungs 4 times daily  4/15/15  Yes Historical Provider, MD   atorvastatin (LIPITOR) 10 MG tablet Take 10 mg by mouth daily. Yes Historical Provider, MD   warfarin (COUMADIN) 4 MG tablet Take 2 mg by mouth daily [id number]   Yes Historical Provider, MD   montelukast (SINGULAIR) 10 MG tablet Take 10 mg by mouth nightly. Yes Historical Provider, MD   Acetaminophen (TYLENOL PO) Take  by mouth as needed.    Yes Historical Provider, MD              REVIEW OF SYSTEMS:    CONSTITUTIONAL:  negative for  fevers, chills, sweats, fatigue, malaise, anorexia and weight loss  EYES:  negative for  double vision, blurred vision, dry eyes, eye discharge and redness  HEENT:  negative for  hearing loss, tinnitus, ear COPD and valvular heart disease  Discussed with patient and her daughter in detail  Advised her to quit smoking  Follow-up with 1 month      Requested Prescriptions     Signed Prescriptions Disp Refills    umeclidinium-vilanterol (ANORO ELLIPTA) 62.5-25 MCG/INH AEPB inhaler 1 each 3     Sig: Inhale 1 puff into the lungs daily       There are no discontinued medications. Mike Blake received counseling on the following healthy behaviors: nutrition, exercise and medication adherence    Patient given educational materials : see patient instruction       Discussed use, benefit, and side effects of prescribed medications. Barriers to medication compliance addressed. All patient questions answered. Pt voiced understanding. I hope this updates you on my evaluation and clinical thinking. Thank you for allowing me to participate in his care. Sincerely,      Electronically signed by Michael Lugo MD on 6/8/2020 at 2:32 PM       Please note that this chart was generated using voice recognition Dragon dictation software. Although every effort was made to ensure the accuracy of this automated transcription, some errors in transcription may have occurred.

## 2020-07-16 NOTE — TELEPHONE ENCOUNTER
Phone call on 7/15/20  to pt and son, Silvia Betancourt, by writeli and Grisel Moreno. Phone encounter with son, Silvia Betancourt, who relays information to pt, Janiya Marin. Explained need for testing including CT imaging and PFT for work up for TAVR procedure. Pt and son are agreeable to testing/work up if days are limited for travelling to [place], [place]. It is agreed one trip to [facility] will be required for CT imaging/PFT and another episode of traveling to [facility] for the actual TAVR procedure itself. Covid testing will be performed in the home, if possible, or rapid testing will be performed on the pt when she arrives at the facility  on both of these days. Pt expresses agreement to this testing. Pt has been living with her daughter in [place]. Yazan's phone no. Is [phone]. He prefers to be contacted for all planning of testing for work up,  and planning of her TAVR procedure. Silvia Betancourt confirms pt was admitted to [facility] approximately 2 weeks ago. This admit is not reflected in Care Everywhere so [facility] will be contacted for pt's most recent labs and details of her admit.

## 2020-07-31 NOTE — TELEPHONE ENCOUNTER
Conversation with Jeni Bolton updating him on lab values. Explained current lab values, and  risk of IV contrast on pt's kidneys in layman's terms. Simba Zuniga  verbalizes understanding. Pt has not followed up with Nephrology since admit at [facility]. Writer shares that discharge summary from [facility] on 7/1  states to follow up with nephro in 4 weeks. Phone no. to Dr lozoya's office given to son to make f/u appt. Simba Zuniga  understands renal appt would not have to take place before MERRY is planned. Will reach out to [employer] re: their presense  at MERRY, and reach out to  for MERRY scheduling.
Dicussed with Shalini Lainez, on 7/30,  that plans are being made for a MERRY on Mon Aug 10 with a potential date of tues Aug 11 for TAVR for Brandyn Mendoza. She is profoundly [place], so heidi will relay message to her, and he confirms transportation will be available for her for these dates. Pt is on coumadin , she will be contacted next week regarding when to stop med. [facility]ses [facility] in [place] as preferred pharmacy. Bonnie Corbin confirms family will be able to assist pt with Lovenox injections to bridge pt while off of coumadin. When MERRY timing is confirmed by [facility] staff, we will be back in touch with pt and her family. Bonnie Corbin verbalizes understanding.
Due to TIFFANIE diagnosis of last admit at [facility], pt should have labs to check renal chemistry. IV contrast will be needed for pt's CT imaging prior to TAVR. Spoke with pt's son, Trista Wong, last week and mailed to him BMP order to his home address. Trista Wong calls today stating order has not yet arrived in the [address] mail and he would like the order faxed to [facility] lab. Order Sent via fax to [phone] and receipt of order confirmed by Jason Pitts at [facility]. Trista Wong called at [phone] & updated order received and lab hours relayed. Writer requested lab results be faxed back to [facility] team for evaluation.
Unable to reach son, Josy Jay. LMOM. Pt's labs from [facility] were received and  reviewed. Pt with Renal insufficiency, bun/Cr 53/2.0 and GFR 25.6. CT contrast is  not an option for Ms. Benjy Sauceda due to renal chemistry. Dr. Damian Mancuso has been updated on lab values and awaiting advice from Dr Damian Mancuso on next options for pt. Will attempt to reach Mr. Kate Winter again on Mon 7/27/20.
Plan

## 2020-08-04 PROBLEM — Z79.01 ANTICOAGULATED: Status: ACTIVE | Noted: 2020-01-01

## 2020-08-04 PROBLEM — N31.2 ATONIC BLADDER: Status: ACTIVE | Noted: 2020-01-01

## 2020-08-04 PROBLEM — R33.9 URINARY RETENTION: Status: ACTIVE | Noted: 2020-01-01

## 2020-08-04 NOTE — TELEPHONE ENCOUNTER
Reach pt at [phone], her residence in [place], [place]. Updated on coumadin plan, last day is 8/6, with plans to start lovenox injections on Fri 8/7, syringe order already called in to [facility]. NPO at 0000 on Mon 8/10 for MERRY.   KCCQ completed and Oliveira index completed. MMSE was started, and will be completed day of MERRY. Pt states she tries to be acitve at home as much as she can. She folds clothes, washes dishes. Is occasionally able to bake cookies. She feels better in the am. Her supplemental O2 was increased from 2L to 3 L per NC at time of last discharge. Pt is advised to call with any questions that she may have. CHF Functional Classification / Stages:    CHF    NYHA Functional Classification I  MilD    []  II  Mild    []  III Moderate    [x]  IV Severe    []                   [address] Cardiomyopathy Questionnaire [place])  The following questions refer to your heart failure and how it may affect your life. Please read and complete the  following questions. There are no right or wrong answers. Please carmine the answer that best applies to you. 1. Heart failure affects different people in different ways. Some feel shortness of breath while others feel fatigue. Please indicate how much you are limited by heart failure (shortness of breath or fatigue) in your ability to do  the following activities over the past 2 weeks. Activity                       Extremely      Limited Quite a bit    Limited Moderatly    Limited Slightly         Limited Not at all      Limted Limited for other   reasons / or no activity    1 2 3 4 5 6   a. Showering/bathing   []  []  []  [x]  []  []    b. Walking 1 block on level ground   []  []   []  []   []  [x]    c. Hurrying or jogging (As if to catch a bus) []  []   []  []    []  [x]   Total Score:               2. Over the past 2 weeks, how many times did you have swelling in your feet, ankles or legs when you woke up in the morning?     Every much does your heart failure affect your lifestyle? Please indicate how your heart failure may have limited  your participation in the following activities over the past 2 weeks. Activity                       Severely      Limited Limited  Quite a bit   Moderately   Limited Slightly         Limited Did not      Limit at all Does not apply  or did not do   for other reasons    1 2 3 4 5 6   a. Hobbies, recreational activuities  []   [x]  []   []   []   []     b. Working or doing householdchores  []   [x]  []    []   []  []     c. Visiting family or friends out of your home [x]    []  []   []    []  []   Total Score: Total Score Now:    33    Oliveira score 6/6. MMSE to be completed when the pt arrives for her MERRY.      [facility]

## 2020-08-04 NOTE — TELEPHONE ENCOUNTER
Contacted pt's pcp., Dr Fernandez Ortiz, who manages pt's anticoagulation. Per Aide Fountain LPN, pt's last INR was 3.0 on 6/26/20. Their office to fax  pt's most recent labs. PCP office updated on pt's coumadin to be stopped on 8/7/20, 4 days prior to planned TAVR, and bridged on lovenox. Med order already sent to her pharm. LPN verbalizes understanding. Called pt at her residence, [phone]. No answer,  unable to [facility]. Called son, Liliya Fernandez, and updated him, last day of coumadin to be Thursday, Aug 6th. Hold coumadin effective 8/7 and start  Lovenox injections. Syringes x 8 already sent to [address] [place] [address] in [place]. Advised to pharmacy for visual instructions on how to inject BID. Reminded MERRY is scheduled for Mon aug 10th. NPO starting midnight . Ok sip of water with meds in the am.   To report to 2 nd [facility] for temp and mask, then be directed to registration. Report at 1100 for MERRY and  rapid covid testing. Updated him that Elgin Palacio could not be reached due to no answer. Liliya Fernandez verbalizes understanding.

## 2020-08-06 PROBLEM — D69.6 THROMBOCYTOPENIA (HCC): Status: ACTIVE | Noted: 2020-01-01

## 2020-08-06 NOTE — TELEPHONE ENCOUNTER
Writer jessica'd phone call from [email or web address] from [facility]. Leo. She states she needs to speak with Urology Department for a patient of Dr Raymon Plasencia. She states patient is scheduled for a valve replacement at [facility] on 8/11/2020. The patient's family has informed her that Ivon Baird is to come to [place] to meet with the Urology nurses on 8/13/2020 for her      16fr. Cath change. The patient's family wants to know if patient could have catheter [facility] instead of having to come to [place] to do it 2 days after valve replacement. Please call Mansi Capone at [facility] at [phone]. You can leave a voice message if she misses your call and let her know a good time for her to call you back.

## 2020-08-06 NOTE — TELEPHONE ENCOUNTER
Contacted [facility], let them know it is okay for patient to have ruiz exchanged there and to consult urology if needed.

## 2020-08-07 PROBLEM — N18.30 STAGE 3 CHRONIC KIDNEY DISEASE (HCC): Status: ACTIVE | Noted: 2020-01-01

## 2020-08-07 PROBLEM — M40.209 KYPHOSIS: Status: ACTIVE | Noted: 2020-01-01

## 2020-08-07 PROBLEM — I25.10 CORONARY ARTERY DISEASE INVOLVING NATIVE CORONARY ARTERY WITHOUT ANGINA PECTORIS: Status: ACTIVE | Noted: 2020-01-01

## 2020-08-10 PROBLEM — Z98.62 HISTORY OF ANGIOPLASTY: Status: ACTIVE | Noted: 2019-10-14

## 2020-08-10 PROBLEM — I35.0 AORTIC STENOSIS, SEVERE: Status: ACTIVE | Noted: 2019-07-25

## 2020-08-10 PROBLEM — I65.29 CAROTID ARTERY STENOSIS: Status: ACTIVE | Noted: 2018-03-08

## 2020-08-10 NOTE — PROGRESS NOTES
To patient's bedside in [facility] unit to complete  MMSE and fragility testing. MMSE score: 26/30   testin,8,8 KG. Pt's son is updated per telephone the time of pt's TAVR procedure in the am.     Pt taken back to cath lab suite for MERRY procedure.        [facility]

## 2020-08-10 NOTE — PROCEDURES
[facility]  3D MERRY PreOP TAVR          Today's Date: 8/10/2020  Primary/Ordering Cardiologist:   Indication:     Operators:  Primary:Dr. Angie Armando      Patient seen and examined. History and Physical reviewed. Labs reviewed. After informed consent was obtained with explanation of the risks and benefits, the patient was brought to [facility] Cath lab. All sedation was administered via the Anesthesia department. The oropharynx was pre anesthetisized with cetacaine spray. A single intubation effort was required. MERRY Positive findings:    Structures:    Normal LV function with EF 55%  No wall motion abnormalities  Normal wall thickness  No pericardial effusion  Left Atrium/SUSANNA- Dilated LA, no clot   IntraAtrial Septum- Normal  Descending Thoracic Aorta: Mild plaque    Valves:    Mitral valve: Thickened leaflets with moderate regurgitation. Aortic valve:Trileaflet. Calcified with thickened leaflets and severely restricted opening . Tricuspid valve: Normal .No valvular vegetations or thrombus identified. Pulmonic valve: not well visualized     Preop TAVR measurements:   - Annulus Diameter- 18.9 x 24.6 mm  - Annulus Perimeter- 73 mm  - Sinus of Valsalva Diameter- 31.3 LCC x 30.5 RCC x 30.8 NCC mm  - Sinus of valsalva height: 20.3 mm   - Coronary Ostia Height- LM height is 10 mm, RCA height is 9.3 mm  - LVOT diameter- 17.5 x 26.1 mm  - LVOT perimeter- 71.8 mm  - JOSE MIGUEL 0.76 cm2    Summary:     1. A MERRY was performed without complications. 2. LVEF >55%  3. No thrombus or valvular vegetation identified  4. Calcified aortic leaflets with severely restricted opening   5.  Moderate Mitral regurgitation           Electronically signed by Saba Maki MD on 8/10/2020 at 4:23 PM    [facility]  [phone]      I have reviewed the case / procedure with resident / fellow  I have examined the patient personally  Patient agree with treatment plan, correction innotes was made as appropriate, and discussed final arrangement based on  my evaluation and exam.    Risk and benefit of procedure if planned were explained in details. Procedure was performed by me, with all aspect of the procedure being done using standard protocol. Note was modified based on my own assessment and treatment.     Jose Francisco Saxena MD  [facility]

## 2020-08-10 NOTE — PROGRESS NOTES
Pt. Poor historian regarding medication regiment. Report she was given meds for itching/swelling of left upper forearm/elbow and uncertain of BP med regiment other than she takes norvasc and has been told not to take further meds.

## 2020-08-10 NOTE — PROGRESS NOTES
RN called lab because they were unable to draw patient INR prior to lovenox shot. Lab told RN that they will be here shortly, when they have time, to draw patients labs.

## 2020-08-10 NOTE — H&P
[facility] Cardiology   History & Physical               Today's Date: 8/10/2020  Patient Name: Stu Tinoco  Date of admission: No admission date for patient encounter. Patient's age: 67 y. o., 1940  Admission Dx: No admission diagnoses are documented for this encounter. Reason for Admission:  MERRY for evaluation of AS    CHIEF COMPLAINT:    No chief complaint on file. History Obtained From:  patient, electronic medical record    HISTORY OF PRESENT ILLNESS:      The patient is a 67 y.o.  female who is presents today for a MERRY for further evaluation of her Aortic Stenosis. Her PMH is significant for CAD with PTCA -ANA LAD in Sept 2019, PVD - Venous insufficiency, Carotid disease - Asymptomatic, Severe aortic stenosis with mean gradient 34 mmHg and valve area 0.7 cm2, H/O Atrial fibrillation, converted to NSR, Smoker / COPD. She was seen in the clinic by Dr. Marvin Dang on 7/10/2020. She was previously advised that she is a candidate for TAVR based on the information that was gathered during her heart catheterization as the severity of the valve tightness, in addition to her recurrent CHF exacerbations. However because of her recent rectal bleeding and need for GI evaluation, evaluation of her candidacy for TAVR was deferred until GI clearance for anticoagulation. GI has recently cleared her for anticoagulation use. She was admitted to the hospital for CHF exacerbation approximately at the end of June, which correlates with her current problem related to her severe aortic stenosis. The patient denied any chest pain, denied any recent syncope. The patient has multiple bruises on her skin.   Past Medical History:   has a past medical history of TIFFANIE (acute kidney injury) ([address] Anemia, Aortic valve disease, Asthma, Atrial fibrillation ([address] Backache, unspecified, Blood in stool, Breast cancer, left ([address] Chronic venous insufficiency, COPD (chronic obstructive pulmonary disease) ([address] hours as needed for Wheezing    Historical Provider, MD   celecoxib (CELEBREX) 200 MG capsule Take 200 mg by mouth daily    Historical Provider, MD   FLOVENT  MCG/ACT inhaler Inhale 1 puff into the lungs 2 times daily  7/28/15   Historical Provider, MD   PROVENTIL  (90 BASE) MCG/ACT inhaler Inhale 1 puff into the lungs 4 times daily  4/15/15   Historical Provider, MD   atorvastatin (LIPITOR) 10 MG tablet Take 10 mg by mouth daily. Historical Provider, MD   warfarin (COUMADIN) 4 MG tablet Take 2 mg by mouth daily [id number]    Historical Provider, MD   montelukast (SINGULAIR) 10 MG tablet Take 10 mg by mouth nightly. Historical Provider, MD   Acetaminophen (TYLENOL PO) Take  by mouth as needed. Historical Provider, MD        No current facility-administered medications for this encounter. Allergies:  Patient has no known allergies. Social History:   reports that she has been smoking cigarettes. She has a 47.00 pack-year smoking history. She has never used smokeless tobacco. She reports that she does not drink alcohol or use drugs. Family History: family history includes Arthritis in her father and mother; Cancer in her sister; Heart Disease in her brother; Other in her mother. REVIEW OF SYSTEMS:      · Constitutional: there has been no unanticipated weight loss. · Eyes: No visual changes or diplopia. · ENT: No Headaches  · Cardiovascular:  Remaining as above  · Respiratory: No cough  · Gastrointestinal: No abdominal pain. No change in bowel or bladder habits. · Genitourinary: No dysuria, trouble voiding, or hematuria. · Neurological: No headache.     PHYSICAL EXAM:      LMP  (LMP Unknown)    No intake or output data in the 24 hours ending 08/10/20 0929    Constitutional and General Appearance:   alert, cooperative, no distress and appears stated age  HEENT:  · PERRL, EOMI  Respiratory:  · Normal excursion and expansion without use of accessory muscles  · Resp Auscultation:  Good respiratory effort. No for increased work of breathing. On auscultation: crackles appreciate bilaterally at bases  Cardiovascular:  · Regular rate and rhythm  · G5/N0  · Systolic murmur appreciated at right sternal border. · The apical impulse is not displaced  Abdomen:  · Soft  · Bowel sounds present  · Non-tender to palpation  Extremities:  · No cyanosis or clubbing  · +2 pitting edema upto knees  Skin:  · Warm and dry  Neurological:  · Alert and oriented. · Moves all extremities well    Labs:     CBC: No results for input(s): WBC, HGB, HCT, PLT in the last 72 hours. BMP: No results for input(s): NA, K, CO2, BUN, CREATININE, LABGLOM, GLUCOSE in the last 72 hours. Pro-BNP:  No results for input(s): PROBNP in the last 72 hours. BNP: No results for input(s): BNP in the last 72 hours. PT/INR: No results for input(s): PROTIME, INR in the last 72 hours. APTT:No results for input(s): APTT in the last 72 hours. CARDIAC ENZYMES:No results for input(s): CKTOTAL, CKMB, CKMBINDEX, TROPONINI in the last 72 hours. Invalid input(s):  TROPONINT  No results for input(s): TROPONINT in the last 72 hours. FASTING LIPID PANEL:No results found for: HDL, LDLDIRECT, LDLCALC, TRIG  LIVER PROFILE:No results for input(s): AST, ALT, LABALBU in the last 72 hours. Patient's Active Problem List  Active Problems:    * No active hospital problems. *  Resolved Problems:    * No resolved hospital problems. *        IMPRESSION:  1. Severe aortic stenosis with mean gradient 34 mmHg and valve area 0.7 cm2  2. CAD with PTCA -ANA LAD in Sept 2019  3. PVD - Venous insufficiency  4. Carotid disease - Asymptomatic  5. H/O Atrial fibrillation, converted to NSR  6. Smoker / COPD. RECOMMENDATIONS:  Proceed with MERRY  Follow up on recommendations post MERRY    Transesophageal echocardiography (procedure) has been fully reviewed with the patient and written informed consent has been obtained.  The risks,

## 2020-08-10 NOTE — CARE COORDINATION
Case Management Initial Discharge Plan  Mathew Ronel,             Met with:patient to discuss discharge plans. Information verified: address, contacts, phone number, , insurance Yes    Emergency Contact/Next of Kin name & number: Amarilis Hewitt [phone]    PCP: Emely Killian MD  Date of last visit:     Insurance Provider: Medical mutual medicare    Discharge Planning    Living Arrangements:  Aliciaber Cristela Harden has 2 stories   stairs to climb to get into front door, stairs to climb to reach second floor  Location of bedroom/bathroom in home main    Patient able to perform ADL's:Independent    Current Services (outpatient & in home) none  DME equipment: walker  DME provider:     Receiving oral anticoagulation therapy? Yes    If indicated:   Physician managing anticoagulation treatment:   Where does patient obtain lab work for ATC treatment? Potential Assistance Needed:  Home Care, [facility]    Patient agreeable to home care: No  [place] of choice provided:  no    Prior SNF/Rehab Placement and Facility: no  Agreeable to SNF/Rehab: No  [place] of choice provided: no     Evaluation: no    Expected Discharge date:       Patient expects to be discharged to:  home  Follow Up Appointment: Best Day/ Time:      Transportation provider: family  Transportation arrangements needed for discharge: No    Readmission Risk              Risk of Unplanned Readmission:        10             Does patient have a readmission risk score greater than 14?: No  If yes, follow-up appointment must be made within 7 days of discharge.      Goals of Care:       Discharge Plan: TAVR tomorrow, goal is home          Electronically signed by Carmelina Sweet RN on 8/10/20 at 3:20 PM EDT

## 2020-08-10 NOTE — PROGRESS NOTES
Patient admitted, consent signed, all questions answered. Pt ready for procedure. Call light to reach with side rails up 2 of 2. Son at bedside with patient.

## 2020-08-10 NOTE — PROGRESS NOTES
Pt. C/o wheezing, saturations  Maintained at >95% on 3L O2 NC. Pt. Requested inhaler, though had received inhaler within 2 hours. Respiratory contacted to see if Prn dose could be administered.

## 2020-08-11 NOTE — ANESTHESIA POSTPROCEDURE EVALUATION
Department of Anesthesiology  Postprocedure Note    Patient: Daniel Castro  MRN: [id number]  YOB: 1940  Date of evaluation: 8/11/2020  Time:  10:33 AM     Procedure Summary     Date:  08/11/20 Room / Location:  [facility] Cath Lab    Anesthesia Start:  7893 Anesthesia Stop:  0015    Procedure:  TAVR W/ ANESTHESIA Diagnosis:      Scheduled Providers: Lenin Segovia MD Responsible Provider:  Lenin Segovia MD    Anesthesia Type:  MAC ASA Status:  4          Anesthesia Type: MAC    Connie Phase I:      Connie Phase II:      Last vitals: Reviewed and per EMR flowsheets.        Anesthesia Post Evaluation    Patient location during evaluation: ICU  Patient participation: complete - patient participated  Level of consciousness: awake and alert  Pain score: 0  Airway patency: patent  Nausea & Vomiting: no vomiting and no nausea  Complications: no  Cardiovascular status: hemodynamically stable  Respiratory status: acceptable  Hydration status: stable

## 2020-08-11 NOTE — OP NOTE
[facility]  TAVR Procedure Note                 Procedure Date:   8/11/2020  Patient name:  Zabrina Roberts  Date of admission:  8/10/2020 12:45 PM  MRN:   [id number]  YOB: 1940    Reason for Admission:  TAVR procedure, severe aortic stenosis    ASA Class:    [] I [x] II [] III [] IV    Mallampati Class:  [] I [] II [x] III [] IV    CHF NYHA Classification / Stages:    III    KCCQ-12:    33      PREOPERATIVE DIAGNOSIS:    1. Severe aortic stenosis. 2. Advance Age. 79  3. Frailty. severe  4. Renal insuffiencey. 5. Anemia   6. Thrombocytopena  7. Severe COPD      POSTOPERATIVE DIAGNOSIS:    1. Same      PROCEDURE PERFORMED:       1. Transcatheter aortic valve replacement using a 29 mm CoreValve via  left Femoral approach. Serial # [id number]  2. Closure devices for both common femoral arteries  3. Selective angiography [x]Femoral [x]Iliac  4. Multiple aortic root injections  5. Temporary venous pacemaker placement via left Femoral vein. Operators:    Interventional Cardiologist:  [x] Delmis Morocho M.D. [] Lenin Brennan MD [] Jared Mariscal MD   [] FANNIE Maravilla M.D    CV Surgeon:       [] AFUA Ricardo MD [x] Kwame Gonzales M.D    Anesthesia:     [] General [x] MAC    History Obtained From:  Patient and medical records    HISTORY OF PRESENT ILLNESS:      The patient Zabrina Roberts is a 67years old who had been thoroughly evaluated by the TAVR team. Known with severe symptomatic aortic stenosis and was deemed to be high risk by 1 surgeons. Patient was referred for transcatheter aortic valve replacement.     Past Medical History:   has a past medical history of Acute on chronic respiratory failure ([address] TIFFANIE (acute kidney injury) ([address] Anemia, Anticoagulated, Aortic valve disease, Asthma, Atonic bladder, Atrial fibrillation (HCC), Backache, unspecified, Blood in stool, Breast cancer, left ([address] Cachexia ([address] Chronic venous insufficiency, CKD (chronic kidney disease), COPD (chronic obstructive pulmonary disease) ([facility] Coronary atherosclerosis of unspecified type of vessel, native or graft, Current smoker, Dry skin, Edema, Nevarez catheter present, Full dentures, History of blood transfusion, Hyperlipidemia, Hypertension, Impaired mobility, Kyphosis, Loss of weight, MI (myocardial infarction) ([facility] Osteoarthritis, Poor appetite, PVD (peripheral vascular disease) ([facility] Superficial thrombosis of leg, Unspecified sinusitis (chronic), Unspecified urinary incontinence, Unspecified vitamin D deficiency, and Wears glasses. Past Surgical History:   has a past surgical history that includes Hysterectomy, vaginal (8/13/1974); Appendectomy; Mastectomy (Left); joint replacement (Right); eye surgery (Bilateral, 12/2015); Total shoulder arthroplasty (Left, 2016); Colonoscopy; Cardiac catheterization (10/14/2019); and sigmoidoscopy (N/A, 2/27/2020). DESCRIPTION OF PROCEDURE:    After a sterile prep and drape and administration of appropriate preoperative prophylactic antibiotics, the patient anterior chest, abdomen and legs were maticulously prepped and draped in a sterile manner. A time - out procedure was completed. Access:  [x] Femoral  [] Subclavian   [] LV Apical     [x] Right  [x] Left      Arterial cannulation in both femoral arteries and veins were obtained. Fluoroscopy was utilized. A pigtail catheter was then positioned over the noncoronary cusp of the aortic valve. The patient then was fully heparinized, the left common femoral artery access was used to advance 18 [place] of [place] sheath over the stiff wire. A  Temporary pacemaker was inserted:  [x] Yes  [] No    If yes, insertion side:  [] Right [x] Left     Left in place:   [x] Yes  [] No     The AL1 catheter was advanced through the sheath to cross the aortic valve and measure the transvalvular gradient, which was about 38 mmHg.  Having completed this, we then put the [facility] MINNE wire in the left ventricle,     Balloon valvular plasty was done:

## 2020-08-11 NOTE — H&P
TAVR Consult & Documentation                Today's Date: 8/11/2020  Patient Name: Cyrus Hawkins  Date of admission: 8/10/2020 12:45 PM  Patient's age: 67 y. o., 1940  Admission Dx: Severe aortic stenosis [I35.0]  Severe aortic stenosis [I35.0]    Reason for Consult:  Cardiac evaluation    Requesting Physician: Randell Michel MD    CHIEF COMPLAINT:  SOB    History Obtained From:  patient    HISTORY OF PRESENT ILLNESS:      The patient is a 67 y.o.  female who is admitted to the hospital for TAVR procedure. She is known with:    1. CAD with PTCA -ANA LAD in Sept 2019  2. PVD - Venous insufficiency. 3. Carotid disease - Asymptomatic  4. Severe aortic stenosis with mean gradient 34 mmHg and valve area 0.7 cm2  5. H/O Atrial fibrillation, converted to NSR  6. Smoker / COPD  7. Recent cellulitis left leg 1/2020  8. Anemia due to rectal bleeding Jan 2020 / Post transfusion     Her recurrent admission for SOB related to complexicity of atrial fib and critical aortic stenosis, making additional evaluation difficult, including some GI procedure to identify source of anemia. Patient reported that she does not want to live this way, she is not able to walk for few feet, with LE edema and recurrent CHF as reported despite preserved LV function. The patient also is anemic now for some time, and colonoscopy was limited but did not show any major findings. Her cath as above. Her MERRY yesterday was done for measurement to avoid using contrast due to her renal insufficiency, and measurement were documented below.       Past Medical History:   has a past medical history of Acute on chronic respiratory failure ([address] TIFFANIE (acute kidney injury) ([address] Anemia, Anticoagulated, Aortic valve disease, Asthma, Atonic bladder, Atrial fibrillation (HCC), Backache, unspecified, Blood in stool, Breast cancer, left ([address] Cachexia ([address] Chronic venous insufficiency, CKD (chronic kidney disease), COPD (chronic obstructive pulmonary disease) ([facility] Coronary atherosclerosis of unspecified type of vessel, native or graft, Current smoker, Dry skin, Edema, Nevarez catheter present, Full dentures, History of blood transfusion, Hyperlipidemia, Hypertension, Impaired mobility, Kyphosis, Loss of weight, MI (myocardial infarction) ([facility] Osteoarthritis, Poor appetite, PVD (peripheral vascular disease) [facility] Superficial thrombosis of leg, Unspecified sinusitis (chronic), Unspecified urinary incontinence, Unspecified vitamin D deficiency, and Wears glasses. Past Surgical History:   has a past surgical history that includes Hysterectomy, vaginal (8/13/1974); Appendectomy; Mastectomy (Left); joint replacement (Right); eye surgery (Bilateral, 12/2015); Total shoulder arthroplasty (Left, 2016); Colonoscopy; Cardiac catheterization (10/14/2019); and sigmoidoscopy (N/A, 2/27/2020). Home Medications:    Prior to Admission medications    Medication Sig Start Date End Date Taking? Authorizing Provider   clopidogrel (PLAVIX) 75 MG tablet Take 75 mg by mouth daily 10/16/19  Yes Historical Provider, MD   metoprolol tartrate (LOPRESSOR) 25 MG tablet Take 25 mg by mouth 2 times daily 10/15/19  Yes Historical Provider, MD   umeclidinium-vilanterol (ANORO ELLIPTA) 62.5-25 MCG/INH AEPB inhaler Inhale 1 puff into the lungs daily 6/3/20  Yes Bob Fails, MD   albuterol (ACCUNEB) 1.25 MG/3ML nebulizer solution Inhale 1 ampule into the lungs every 6 hours as needed for Wheezing   Yes Historical Provider, MD   FLOVENT  MCG/ACT inhaler Inhale 1 puff into the lungs 2 times daily  7/28/15  Yes Historical Provider, MD   PROVENTIL  (90 BASE) MCG/ACT inhaler Inhale 1 puff into the lungs 4 times daily  4/15/15  Yes Historical Provider, MD   atorvastatin (LIPITOR) 10 MG tablet Take 10 mg by mouth daily. Yes Historical Provider, MD   montelukast (SINGULAIR) 10 MG tablet Take 10 mg by mouth nightly.    Yes Historical Provider, MD Acetaminophen (TYLENOL PO) Take  by mouth as needed.    Yes Historical Provider, MD   celecoxib (CELEBREX) 200 MG capsule Take 200 mg by mouth 2 times daily    Historical Provider, MD   losartan (COZAAR) 50 MG tablet Take 50 mg by mouth    Historical Provider, MD   atorvastatin (LIPITOR) 10 MG tablet Take 10 mg by mouth    Historical Provider, MD   furosemide (LASIX) 80 MG tablet Take 80 mg by mouth 2 times daily    Historical Provider, MD   hydroCHLOROthiazide (MICROZIDE) 12.5 MG capsule Take 12.5 mg by mouth    Historical Provider, MD   warfarin (COUMADIN) 1 MG tablet Take 1 mg by mouth daily monday    Historical Provider, MD   hydrochlorothiazide (MICROZIDE) 12.5 MG capsule Take 12.5 mg by mouth daily    Historical Provider, MD   metoprolol tartrate (LOPRESSOR) 25 MG tablet Take 0.5 tablets by mouth 2 times daily 10/15/19   Talon Comes, APRN - CNP   clopidogrel (PLAVIX) 75 MG tablet Take 1 tablet by mouth daily 10/16/19   Talon Comes, APRN - CNP   furosemide (LASIX) 40 MG tablet Take 80 mg by mouth 2 times daily     Historical Provider, MD   warfarin (COUMADIN) 4 MG tablet Take 2 mg by mouth daily [id number]    Historical Provider, MD      Current Facility-Administered Medications: vancomycin (VANCOCIN) 750 mg in dextrose 5 % 250 mL IVPB, 750 mg, Intravenous, Once  0.9 % sodium chloride bolus, 20 mL, Intravenous, Once  0.9 % sodium chloride bolus, 250 mL, Intravenous, Once  0.9 % sodium chloride infusion, , Intravenous, Continuous  atorvastatin (LIPITOR) tablet 10 mg, 10 mg, Oral, Daily  albuterol sulfate  (90 Base) MCG/ACT inhaler 1 puff, 1 puff, Inhalation, 4x Daily  fluticasone (FLOVENT HFA) 220 MCG/ACT inhaler 1 puff, 1 puff, Inhalation, BID  albuterol (PROVENTIL) nebulizer solution 1.25 mg, 1.25 mg, Nebulization, Q6H PRN  metoprolol tartrate (LOPRESSOR) tablet 12.5 mg, 12.5 mg, Oral, BID  glycopyrrolate-formoterol (BEVESPI) 9-4.8 MCG/ACT inhaler 1 puff, 1 puff, Inhalation, Daily  [START ON 8/12/2020] celecoxib (CELEBREX) capsule 200 mg, 200 mg, Oral, BID  [START ON 8/12/2020] acetaminophen (TYLENOL) tablet 650 mg, 650 mg, Oral, Q8H PRN  [START ON 8/12/2020] montelukast (SINGULAIR) tablet 10 mg, 10 mg, Oral, Nightly  [START ON 8/12/2020] clopidogrel (PLAVIX) tablet 75 mg, 75 mg, Oral, Daily  furosemide (LASIX) tablet 80 mg, 80 mg, Oral, BID  albuterol sulfate  (90 Base) MCG/ACT inhaler 2 puff, 2 puff, Inhalation, Q6H PRN  enoxaparin (LOVENOX) injection 50 mg, 1 mg/kg, Subcutaneous, Once  losartan (COZAAR) tablet 50 mg, 50 mg, Oral, Daily  hydroCHLOROthiazide (MICROZIDE) capsule 12.5 mg, 12.5 mg, Oral, Daily  clopidogrel (PLAVIX) tablet 300 mg, 300 mg, Oral, Once  hydrALAZINE (APRESOLINE) injection 10 mg, 10 mg, Intravenous, Q6H PRN    Allergies:  Patient has no known allergies. Social History:   reports that she has been smoking cigarettes. She has a 47.00 pack-year smoking history. She has never used smokeless tobacco. She reports that she does not drink alcohol or use drugs. Family History: family history includes Arthritis in her father and mother; Cancer in her sister; Heart Disease in her brother; Other in her mother. No h/o sudden cardiac death. No for premature CAD    REVIEW OF SYSTEMS:    · Constitutional: there has been no unanticipated weight loss, but she is a little and low BMI. There's been lacking energy to do anything  · Eyes: No visual changes or diplopia. No scleral icterus. · ENT: No Headaches, hearing loss or vertigo. No mouth sores or sore throat. · Cardiovascular: CAD, stent, atrial fib, and critical aortic stenosis  · Respiratory: Known with severe COPD  · Gastrointestinal: No abdominal pain, appetite loss, blood in stools. No change in bowel or bladder habits. · Genitourinary: No dysuria, trouble voiding, or hematuria. Recent GI work up did not show any active bleeding.   · Musculoskeletal:  No gait disturbance, No weakness or joint complaints. · Integumentary: No rash or pruritis. · Neurological: No headache, diplopia, change in muscle strength, numbness or tingling. No change in gait, balance, coordination, mood, affect, memory, mentation, behavior. · Psychiatric: No anxiety, or depression. · Endocrine: No temperature intolerance. No excessive thirst, fluid intake, or urination. No tremor. · Hematologic/Lymphatic: No abnormal bruising or bleeding, blood clots or swollen lymph nodes. · Allergic/Immunologic: No nasal congestion or hives. PHYSICAL EXAM:      BP (!) 146/75   Pulse 76   Temp 98.2 °F (36.8 °C) (Oral)   Resp 16   Ht 4' 11\" (1.499 m)   Wt 99 lb 3.3 oz (45 kg)   LMP  (LMP Unknown)   SpO2 100%   BMI 20.04 kg/m²    Constitutional and General Appearance: alert, cooperative, no distress and appears stated age  HEENT: PERRL, no cervical lymphadenopathy. No masses palpable. Normal oral mucosa  Respiratory:  · Normal excursion and expansion without use of accessory muscles  · Resp Auscultation: Good respiratory effort. No for increased work of breathing. On auscultation: clear to auscultation bilaterally  Cardiovascular:  · The apical impulse is not displaced  · Heart tones are crisp and normal. regular S1 and S2.  · Jugular venous pulsation Normal  · The carotid upstroke is normal in amplitude and contour without delay or bruit  · Peripheral pulses are symmetrical and full   Abdomen:   · No masses or tenderness  · Bowel sounds present  Extremities:  ·  No Cyanosis or Clubbing  ·  Lower extremity edema: No  ·  Skin: Warm and dry  Neurological:  · Alert and oriented.   · Moves all extremities well  · No abnormalities of mood, affect, memory, mentation, or behavior are noted    DATA Needed to complete full TAVR evaluation:    EKG:            MERRY:    Preop TAVR measurements:   - Annulus Diameter- 18.9 x 24.6 mm  - Annulus Perimeter- 73 mm  - Sinus of Valsalva Diameter- 31.3 LCC x 30.5 RCC x 30.8 NCC mm  - Sinus of valsalva ([facility]    Stage 3 chronic kidney disease ([address]    Kyphosis    Coronary artery disease involving native coronary artery without angina pectoris       RECOMMENDATIONS:  1. Plan on TAVR size between 26 to 29 based on final imaging during procedure. Access through the right Femoral after reviewing the cath angiogram.  2. Temporary pacer during procedure. Access is the left side. 3. Transfuse one unit of blood to maintain Hgb above 8.  4. Minimal contrast use to minimize effect on renal status. I had and the [facility] heart team had a long discussion with patient and her son. All risk and benefits were explained, and the significant risk of proceeding based on STS score, on the other hand if we are able to improve her CO that could help the other problem to live a better quality of life, which she insist that is what she want, but also expressed many time she cant live like this, in and out of the hospital and also with that much of SOB. She and her son understand the risk of death, respiratory failure, bleeding and shutting down her kidney. Discussed with patient, family and other anesthesia and Nurse.     Electronically signed by Enma Taylor MD on 8/11/2020 at 6:56 AM  [phone]           [phone]

## 2020-08-11 NOTE — ANESTHESIA PRE PROCEDURE
Department of Anesthesiology  Preprocedure Note       Name:  Romain Israel   Age:  67 y.o.  :  1940                                          MRN:  [id number]         Date:  2020      Surgeon: * Surgery not found *    Procedure:     Medications prior to admission:   Prior to Admission medications    Medication Sig Start Date End Date Taking? Authorizing Provider   clopidogrel (PLAVIX) 75 MG tablet Take 75 mg by mouth daily 10/16/19  Yes Historical Provider, MD   metoprolol tartrate (LOPRESSOR) 25 MG tablet Take 25 mg by mouth 2 times daily 10/15/19  Yes Historical Provider, MD   umeclidinium-vilanterol (ANORO ELLIPTA) 62.5-25 MCG/INH AEPB inhaler Inhale 1 puff into the lungs daily 6/3/20  Yes Crow Johnston MD   albuterol (ACCUNEB) 1.25 MG/3ML nebulizer solution Inhale 1 ampule into the lungs every 6 hours as needed for Wheezing   Yes Historical Provider, MD   FLOVENT  MCG/ACT inhaler Inhale 1 puff into the lungs 2 times daily  7/28/15  Yes Historical Provider, MD   PROVENTIL  (90 BASE) MCG/ACT inhaler Inhale 1 puff into the lungs 4 times daily  4/15/15  Yes Historical Provider, MD   atorvastatin (LIPITOR) 10 MG tablet Take 10 mg by mouth daily. Yes Historical Provider, MD   montelukast (SINGULAIR) 10 MG tablet Take 10 mg by mouth nightly. Yes Historical Provider, MD   Acetaminophen (TYLENOL PO) Take  by mouth as needed.    Yes Historical Provider, MD   celecoxib (CELEBREX) 200 MG capsule Take 200 mg by mouth 2 times daily    Historical Provider, MD   losartan (COZAAR) 50 MG tablet Take 50 mg by mouth    Historical Provider, MD   atorvastatin (LIPITOR) 10 MG tablet Take 10 mg by mouth    Historical Provider, MD   furosemide (LASIX) 80 MG tablet Take 80 mg by mouth 2 times daily    Historical Provider, MD   hydroCHLOROthiazide (MICROZIDE) 12.5 MG capsule Take 12.5 mg by mouth    Historical Provider, MD   warfarin (COUMADIN) 1 MG tablet Take 1 mg by mouth daily monday    Historical Provider, ON 8/12/2020] montelukast (SINGULAIR) tablet 10 mg  10 mg Oral Nightly Pretty Miguel MD        [START ON 8/12/2020] clopidogrel (PLAVIX) tablet 75 mg  75 mg Oral Daily Pretty Miguel MD        furosemide (LASIX) tablet 80 mg  80 mg Oral BID Pretty Miguel MD   80 mg at 08/11/20 0619    albuterol sulfate  (90 Base) MCG/ACT inhaler 2 puff  2 puff Inhalation Q6H PRN Pretty Miguel MD   2 puff at 08/11/20 0501    enoxaparin (LOVENOX) injection 50 mg  1 mg/kg Subcutaneous Once Pretty Miguel MD        losartan (COZAAR) tablet 50 mg  50 mg Oral Daily Pretty Miguel MD   50 mg at 08/11/20 0507    hydroCHLOROthiazide (MICROZIDE) capsule 12.5 mg  12.5 mg Oral Daily Pretty Miguel MD   12.5 mg at 08/11/20 0507    clopidogrel (PLAVIX) tablet 300 mg  300 mg Oral Once Pretty Miguel MD        hydrALAZINE (APRESOLINE) injection 10 mg  10 mg Intravenous Q6H PRN Sai Dominguez MD   10 mg at 08/10/20 1814       Allergies:  No Known Allergies    Problem List:    Patient Active Problem List   Diagnosis Code    Per[facility] I73.9    Dermatophytosis of nail B35.1    Corns and callosities L84    Peripheral venous insufficiency I87.2    HT (hammer toe) M20.40    Carotid artery stenosis I65.29    Swelling of left lower extremity M79.89    Cellulitis of left lower limb L03. 80    History of angioplasty Z98.62    Aortic stenosis, severe I35.0    COPD (chronic obstructive pulmonary disease) ([facility]) J44.9    Presence of stent in coronary artery in patient with coronary artery disease I25.10, Z95.5    Smoker unmotivated to quit F17.200    A-fib ([facility] I48.91    Anticoagulated Z79.01    Urinary retention R33.9    Atonic bladder N31.2    Hypertension I10    Thrombocytopenia (HCC) D69.6    Stage 3 chronic kidney disease (HCC) N18.3    Kyphosis M40.209    Coronary artery disease involving native coronary artery without angina pectoris I25.10       Past Medical History:        Diagnosis Date    Acute on chronic respiratory failure (HCC)     [facility]ANIE [facility]) [facility]     Anemia     Anticoagulated     Aortic valve disease 01/20/2020    dr Margareth Hilton     or 3/20/20     Asthma     Atonic bladder     Atrial fibrillation (HCC)     Backache, unspecified     Blood in stool     Breast cancer, left ([facility] pt unsure of year, she was in her 64's    left    Cachexia ([facility]     Chronic venous insufficiency     CKD (chronic kidney disease)     COPD (chronic obstructive pulmonary disease) ([facility]     Coronary atherosclerosis of unspecified type of vessel, native or graft     dr Riley Sanchez Current smoker     Dry skin     itching    Edema     Nevarez catheter present     Full dentures     History of blood transfusion 01/20/2020    2 units blood rectal bleeding    Hyperlipidemia     Hypertension     Impaired mobility     cane    Kyphosis     Loss of weight     MI (myocardial infarction) (HCC)     x2    Osteoarthritis     Poor appetite     PVD (peripheral vascular disease) (HCC)     Superficial thrombosis of leg     Unspecified sinusitis (chronic)     Unspecified urinary incontinence     Unspecified vitamin D deficiency     Wears glasses        Past Surgical History:        Procedure Laterality Date    APPENDECTOMY      CARDIAC CATHETERIZATION  10/14/2019    COLONOSCOPY      EYE SURGERY Bilateral 12/2015    cataracts/ROB Senior[facility]wana    HYSTERECTOMY, VAGINAL  8/13/1974    JOINT REPLACEMENT Right     Knee    MASTECTOMY Left     pt unsure of year, she was in her 64's     SHOULDER ARTHROPLASTY Left 2016    SIGMOIDOSCOPY N/A 2/27/2020    SIGMOIDOSCOPY DIAGNOSTIC FLEXIBLE performed by Astrid Lundborg Canos IV, DO at [facility]       Social History:    Social History     Tobacco Use    Smoking status: Current Every Day Smoker     Packs/day: 1.00     Years: 47.00     Pack years: 47.00     Types: Cigarettes    Smokeless tobacco: Never Used    Tobacco comment: now 1/2 ppd; prev 1 pack   Substance Use [address]    Drug/Infectious Status (If Applicable):  No results found for: HIV, HEPCAB    COVID-19 Screening (If Applicable):   Lab Results   Component Value Date    COVID19 Not Detected 08/10/2020         Anesthesia Evaluation  Patient summary reviewed no history of anesthetic complications:   Airway: Mallampati: II  TM distance: >3 FB   Neck ROM: full  Mouth opening: > = 3 FB Dental:    (+) lower dentures and upper dentures      Pulmonary:normal exam    (+) COPD: no interval change,  asthma: seasonal asthma,                            Cardiovascular:    (+) hypertension: no interval change, valvular problems/murmurs: AS, past MI: no interval change, CAD: non-obstructive, dysrhythmias: atrial fibrillation, murmur,       ECG reviewed  Rhythm: irregular  Rate: abnormal  Echocardiogram reviewed                  Neuro/Psych:   (+) neuromuscular disease:,             GI/Hepatic/Renal: Neg GI/Hepatic/Renal ROS            Endo/Other: Negative Endo/Other ROS   (+) blood dyscrasia: anemia:., .                 Abdominal:           Vascular:                                      Anesthesia Plan      MAC     ASA 4       Induction: intravenous. arterial line    Anesthetic plan and risks discussed with patient. Use of blood products discussed with patient whom consented to blood products. Plan discussed with CRNA.                   Abdon Canales MD   8/11/2020

## 2020-08-11 NOTE — PROGRESS NOTES
Eliza to unit. Update on pt's status and c/o of bilateral foot pain. Notified her of labs and ekg. No new orders rec'd. While at bedside pt's HR increased to 169. Notified that pt has used pacer on a couple of occasions since returning to room from procedure.

## 2020-08-11 NOTE — PROGRESS NOTES
Dr. Gonsalo Vogel at bedside. Updated on patient status. Will continue to monitor patients HR and BP. Dr. Gonsalo Vogel will clarify with Dr. Damian Mancuso about anticoagulation.

## 2020-08-11 NOTE — PROGRESS NOTES
RN contacted Dr. Halina Gomez for clarification on Lovenox administration. Dr. Halina Gomez said to just hold the Lovenox.

## 2020-08-11 NOTE — PROGRESS NOTES
Patient admitted, consent signed, all questions answered. Pt ready for procedure. Call light to reach with side rails up 2 of 2.  armando groins clipped. No family at bedside with patient.

## 2020-08-11 NOTE — PROGRESS NOTES
Dr. Alba Derry in consent for anesthesia signed, Hgb. 7.7  Order given to start unit of blood on [facility]. Resp. Called to do  Treatment as ordered.

## 2020-08-11 NOTE — PROGRESS NOTES
Dr. Leigha Leo at bedside. Orders received for a one time does of 12.5 mg of fentanyl for pain. Will continue to monitor.

## 2020-08-11 NOTE — PROGRESS NOTES
Chuyita NP with urology on the phone. Updated on patients request to have chronic ruiz replaced while in hospital and orders received to change ruiz. Updated on patients UA results as well. Will continue to monitor.

## 2020-08-11 NOTE — PROGRESS NOTES
Spoke to [facility] about pt's HR in 90's. States hold lopressor for now.  No other orders at present time

## 2020-08-11 NOTE — PROGRESS NOTES
Dr. Ty Nguyen contacted about patient continuous pain. Orders received for 5 mg Oxycodone q6h PRN.  Will continue to monitor

## 2020-08-12 NOTE — PROGRESS NOTES
Physical Therapy    Facility/Department: [facility] 1  Initial Assessment    NAME: Roshan Wyatt  : 1940  MRN: [id number]  S/p TAVR  Date of Service: 2020    Discharge Recommendations: Further therapy recommended at discharge. PT Equipment Recommendations  Equipment Needed: Yes  Mobility Devices: [place] Poisson: Rolling    Assessment   Body structures, Functions, Activity limitations: Decreased functional mobility ; Decreased balance;Decreased strength;Decreased endurance; Increased pain  Assessment: The pt required Min A x2 to complete sit to stand and Min A x2 progressing to CGA to ambulate with RW. The pt currently requires 24hr physical assistance due to high risk of falls. Recommend continued physical therapy to maximize safety and progress toward prior level of independence. Prognosis: Good  Decision Making: Medium Complexity  PT Education: Goals;PT Role;Plan of Care; Functional Mobility Training  REQUIRES PT FOLLOW UP: Yes  Activity Tolerance  Activity Tolerance: Patient limited by endurance; Patient limited by pain       Patient Diagnosis(es): The primary encounter diagnosis was Aortic stenosis, severe. A diagnosis of Severe aortic stenosis was also pertinent to this visit.      has a past medical history of Acute on chronic respiratory failure ([address] TIFFANIE (acute kidney injury) ([address] Anemia, Anticoagulated, Aortic valve disease, Asthma, Atonic bladder, Atrial fibrillation (HCC), Backache, unspecified, Blood in stool, Breast cancer, left ([address] Cachexia ([address] Chronic venous insufficiency, CKD (chronic kidney disease), COPD (chronic obstructive pulmonary disease) ([address] Coronary atherosclerosis of unspecified type of vessel, native or graft, Current smoker, Dry skin, Edema, Nevarez catheter present, Full dentures, History of blood transfusion, Hyperlipidemia, Hypertension, Impaired mobility, Kyphosis, Loss of weight, MI (myocardial infarction) ([address] Osteoarthritis, Poor appetite, PVD (peripheral vascular disease) ([facility] Superficial thrombosis of leg, Unspecified sinusitis (chronic), Unspecified urinary incontinence, Unspecified vitamin D deficiency, and Wears glasses. has a past surgical history that includes Hysterectomy, vaginal (8/13/1974); Appendectomy; Mastectomy (Left); joint replacement (Right); eye surgery (Bilateral, 12/2015); Total shoulder arthroplasty (Left, 2016); Colonoscopy; Cardiac catheterization (10/14/2019); and sigmoidoscopy (N/A, 2/27/2020). Restrictions  Restrictions/Precautions  Restrictions/Precautions: Bedrest with Bathroom Privileges, Fall Risk  Required Braces or Orthoses?: No  Position Activity Restriction  Other position/activity restrictions: TAVR 8/10  Vision/Hearing  Vision: Impaired  Vision Exceptions: Wears glasses at all times  Hearing: Exceptions to [facility]  Hearing Exceptions: Hard of hearing/hearing concerns     Subjective  General  Patient assessed for rehabilitation services?: Yes  Response To Previous Treatment: Not applicable  Family / Caregiver Present: No  Follows Commands: Within Functional Limits  Subjective  Subjective: RN and pt agreeable to PT. Pt supine in bed upon arrival, pleasant and cooperative throughout. Pt very [place]  Pain Screening  Patient Currently in Pain: Yes  Pain Assessment  Pain Assessment: 0-10  Pain Level: 10  Pain Type: Acute pain  Pain Location: Foot  Pain Orientation: Left  Pain Descriptors: Aching;Discomfort  Non-Pharmaceutical Pain Intervention(s): Repositioned; Ambulation/Increased Activity  Vital Signs  Patient Currently in Pain: Yes       Orientation  Orientation  Overall Orientation Status: Within Functional Limits  Social/Functional History  Social/Functional History  Lives With: Son  Type of Home: House  Home Layout: Two level, Performs ADL's on one level, Able to Live on Main level with bedroom/bathroom  Bathroom Shower/Tub: Tub/Shower unit(sponge bathing recently)  Bathroom Equipment: Grab bars in [address] [place] [address] bed, 4 wheeled walker, Oxygen(3L O2, ambulates with 4WW)  Receives Help From: Family  ADL Assistance: Independent  Homemaking Assistance: Needs assistance  Homemaking Responsibilities: Yes(shares with son)  Meal Prep Responsibility: Secondary  Laundry Responsibility: Secondary  Cleaning Responsibility: Secondary  Shopping Responsibility: Secondary  Ambulation Assistance: Independent(with 0UH)  Transfer Assistance: Independent  Active : No  Patient's  Info: Son  Occupation: Retired  Leisure & Hobbies: baking, cooking  Additional Comments: Pt reports son is supportive and able to assist prn. Cognition   Cognition  Overall Cognitive Status: Exceptions  Following Commands: Follows one step commands with repetition; Follows one step commands with increased time  Attention Span: Attends with cues to redirect  Safety Judgement: Decreased awareness of need for assistance;Decreased awareness of need for safety  Problem Solving: Decreased awareness of errors;Assistance required to implement solutions  Insights: Decreased awareness of deficits  Initiation: Requires cues for some  Sequencing: Requires cues for some  Cognition Comment: Pt very [place] with fair/poor follow through of directions for safety; requiring VC and tactile cues    Objective          Joint Mobility  Spine: WFL  ROM RLE: WFL  ROM LLE: WFL  ROM RUE: WFL- AROM shoulder flexion ~90 degrees  ROM LUE: WFL  Strength RLE  Strength RLE: WFL  Strength LLE  Strength LLE: WFL  Strength RUE  Strength RUE: WFL  Strength LUE  Strength LUE: WFL  Tone RLE  RLE Tone: Normotonic  Tone LLE  LLE Tone: Normotonic  Motor Control  Gross Motor?: WFL  Sensation  Overall Sensation Status: WFL  Bed mobility  Supine to Sit: Minimal assistance  Scooting: Minimal assistance  Comment: HOB elevated, increased time, supine in bed upon arrival, retired to bedside chair following ambulation  Transfers  Sit to Stand: Minimal Assistance;2 Person Assistance  Stand to sit: Minimal Assistance  Bed to Chair: Minimal assistance  Comment: Verbal cues for UE placement with fair return  Ambulation  Ambulation?: Yes  Ambulation 1  Surface: level tile  Device: Rolling Walker  Assistance: Minimal assistance(Min A x2 progressing to CGA)  Quality of Gait: antalgic, decreased LLE WB  Gait Deviations: Decreased step length;Decreased step height;Decreased arm swing;Decreased head and trunk rotation; Slow Saray; Shuffles  Distance: 10ft  Comments: Verbal cues for increased R step length with good return  Stairs/Curb  Stairs?: No     Balance  Posture: Poor  Sitting - Static: Good;-  Sitting - Dynamic: Fair;+  Standing - Static: Fair  Standing - Dynamic: Fair;-  Comments: standing balance assessed with RW        Plan   Plan  Times per week: 5-6x/wk  Current Treatment Recommendations: Strengthening, ROM, Balance Training, Functional Mobility Training, Transfer Training, Gait Training, Endurance Training, Home Exercise Program, Safety Education & Training, Patient/Caregiver Education & Training, Stair training  Safety Devices  Type of devices: Nurse notified, Call light within reach, Gait belt, Left in chair, Chair alarm in place  Restraints  Initially in place: No      AM-PAC Score  AM-PAC Inpatient Mobility Raw Score : 16 (08/12/20 1516)  AM-PAC Inpatient T-Scale Score : 40.78 (08/12/20 1516)  Mobility Inpatient CMS 0-100% Score: 54.16 (08/12/20 1516)  Mobility Inpatient CMS G-Code Modifier : CK (08/12/20 1516)          Goals  Short term goals  Time Frame for Short term goals: 14 visits  Short term goal 1: Perform bed mobility and functional transfers independently  Short term goal 2: Ambulate 300ft with RW and SBA  Short term goal 3: Participate in 30 minutes of therapy to demo increased endurance  Short term goal 4: Demo Fair+ dynamic standing balance to decrease risk of falls  Short term goal 5: Ascend/descend 3 steps with HR and Min A       Therapy Time   Individual Concurrent Group Co-treatment   Time In 1305 [facility]         Minutes 30         Timed Code Treatment Minutes: 10 Minutes       Arti Lamas, PT

## 2020-08-12 NOTE — PROGRESS NOTES
while inpatient. Thank you for the consult. Will continue to follow.     Bradford Garnica RPh, ANIBALP  Clinical Pharmacist Medication Management  8/12/2020  11:43 AM

## 2020-08-12 NOTE — PROGRESS NOTES
Physician Progress Note      Kevin Del Rio  CSN #:                  [id number]  :                       1940  ADMIT DATE:       8/10/2020 12:45 PM  DISCH DATE:  RESPONDING  PROVIDER #:        [id number] A MIRTHA HAMMOND          QUERY TEXT:    Patient admitted for TAVR, noted to have UTI and Chronic ruiz catheter. If possible, please document in the progress notes and discharge summary if   you are evaluating and/or treating any of the following: The medical record reflects the following:  Risk Factors: 67 yr old w/ atonic bladder  Clinical Indicators: UA postivie nitrities, leukocytes and many bacteria. Chronic ruiz cathert due to atonic bladder. Exchange due 2020  Treatment: IV Rocephin, Urine Cx, Urology consult    Thank you in advance for your time, please contact me if I can be of any   assistance. Carlos Mcclelland RN, [facility], Supervisor  [phone]  Options provided:  -- UTI due to chronic indwelling urinary catheter  -- UTI not due to indwelling urinary catheter  -- Other - I will add my own diagnosis  -- Disagree - Not applicable / Not valid  -- Disagree - Clinically unable to determine / Unknown  -- Refer to Clinical Documentation Reviewer    PROVIDER RESPONSE TEXT:    This patient?s UTI is not due to the indwelling urinary catheter.     Query created by: Erika Bradley on 2020 9:32 AM      Electronically signed by:  Dallas Doty PA-C 2020 11:51 AM

## 2020-08-12 NOTE — PLAN OF CARE
PROVIDE ADEQUATE OXYGENATION WITH ACCEPTABLE SP02/ABG'S    [x]  IDENTIFY APPROPRIATE OXYGEN THERAPY  [x]   MONITOR SP02/ABG'S AS NEEDED   [x]   PATIENT EDUCATION AS NEEDED    BRONCHOSPASM/BRONCHOCONSTRICTION     [x]         IMPROVE AERATION/BREATH SOUNDS  [x]   ADMINISTER BRONCHODILATOR THERAPY AS APPROPRIATE  [x]   ASSESS BREATH SOUNDS  []   IMPLEMENT AEROSOL/MDI PROTOCOL  [x]   PATIENT EDUCATION AS NEEDED    Pt home level of care.

## 2020-08-12 NOTE — PROGRESS NOTES
Occupational Therapy   Occupational Therapy Initial Assessment  Date: 2020   Patient Name: Cyrus Hawkins  MRN: [id number]     : 1940    Date of Service: 2020    Discharge Recommendations:  Patient would benefit from continued therapy after discharge     Assessment   Performance deficits / Impairments: Decreased functional mobility ; Decreased ADL status; Decreased high-level IADLs;Decreased safe awareness;Decreased cognition;Decreased endurance;Decreased coordination;Decreased balance  Assessment: Patient demonstrates decreased balance, cognition, and safety awareness affecting performance in ADLs and greatly impacting safety. Pt is very [place] affecting follow through of education to increase safety with functional activity, increasing need for assistance to reduce fall risk. OT services are warranted to address functional deficits and maximize safety and performance in ADL tasks for safe return to PLOF. Pt would benefit from continued therapy services upon d/c. Prognosis: Good  Decision Making: Medium Complexity  Patient Education: OT role, OT POC, purpose of evaluation, importance of OOB activity, hand placement for transfers, awareness of balance deicits for donning socks, importance of elevating B LE's - fair return  REQUIRES OT FOLLOW UP: Yes  Activity Tolerance  Activity Tolerance: Patient limited by fatigue;Patient limited by pain;Treatment limited secondary to medical complications (free text)  Activity Tolerance: [place]  Safety Devices  Safety Devices in place: Yes  Type of devices: All fall risk precautions in place; Left in chair;Call light within reach;Nurse notified;Gait belt; Chair alarm in place  Restraints  Initially in place: No         Patient Diagnosis(es): The primary encounter diagnosis was Aortic stenosis, severe. A diagnosis of Severe aortic stenosis was also pertinent to this visit.      has a past medical history of Acute on chronic respiratory failure ([address] TIFFANIE (acute kidney injury) ([facility] Anemia, Anticoagulated, Aortic valve disease, Asthma, Atonic bladder, Atrial fibrillation (HCC), Backache, unspecified, Blood in stool, Breast cancer, left ([facility] Cachexia ([facility] Chronic venous insufficiency, CKD (chronic kidney disease), COPD (chronic obstructive pulmonary disease) ([facility] [address] Coronary atherosclerosis of unspecified type of vessel, native or graft, Current smoker, Dry skin, Edema, Nevraez catheter present, Full dentures, History of blood transfusion, Hyperlipidemia, Hypertension, Impaired mobility, Kyphosis, Loss of weight, MI (myocardial infarction) ([facility] Osteoarthritis, Poor appetite, PVD (peripheral vascular disease) ([facility] [address] [facility] Superficial thrombosis of leg, Unspecified sinusitis (chronic), Unspecified urinary incontinence, Unspecified vitamin D deficiency, and Wears glasses. has a past surgical history that includes Hysterectomy, vaginal (8/13/1974); Appendectomy; Mastectomy (Left); joint replacement (Right); eye surgery (Bilateral, 12/2015); Total shoulder arthroplasty (Left, 2016); Colonoscopy; Cardiac catheterization (10/14/2019); and sigmoidoscopy (N/A, 2/27/2020). Restrictions  Restrictions/Precautions  Restrictions/Precautions: Bedrest with Bathroom Privileges  Required Braces or Orthoses?: No  Position Activity Restriction  Other position/activity restrictions: TAVR 8/10    Subjective   General  Patient assessed for rehabilitation services?: Yes  Family / Caregiver Present: No  General Comment  Comments: RN Ok'd patient for OT/PT evaluation. Pt pleasant and cooperative throughout. Patient Currently in Pain: Yes  Pain Assessment  Pain Assessment: Faces  Lew-Baker Pain Rating: Hurts little more  Pain Type: Acute pain  Pain Location: Foot  Pain Orientation: Left  Non-Pharmaceutical Pain Intervention(s): Emotional support;Elevation; Ambulation/Increased Activity; Therapeutic presence  Response to Pain Intervention: Patient Satisfied  Vital Signs  Patient Currently in Pain: Yes    Social/Functional History  Social/Functional History  Lives With: Son  Type of Home: House  Home Layout: Two level, Performs ADL's on one level, Able to Live on Main level with bedroom/bathroom  Bathroom Shower/Tub: Tub/Shower unit(sponge bathing recently)  Bathroom Equipment: Grab bars in [address] [facility] bed, 4 wheeled walker, Oxygen(3L O2, ambulates with 4WW)  Receives Help From: Family  ADL Assistance: Independent  Homemaking Assistance: Needs assistance  Homemaking Responsibilities: Yes(shares with son)  Meal Prep Responsibility: Secondary  Laundry Responsibility: Secondary  Cleaning Responsibility: Secondary  Shopping Responsibility: Secondary  Ambulation Assistance: Independent(with 8AH)  Transfer Assistance: Independent  Active : No  Patient's  Info: Son  Occupation: Retired  Leisure & Hobbies: baking, cooking  Additional Comments: Pt reports son is supportive and able to assist prn. Objective   Vision Exceptions: Wears glasses at all times  Hearing: Exceptions to [facility]  Hearing Exceptions: Hard of hearing/hearing concerns          Balance  Sitting Balance: Contact guard assistance(EOB for ~7-8 minutes donning socks and gown)  Standing Balance: Minimal assistance(progressing to CGA EOB)  Functional Mobility  Functional - Mobility Device: 4-Wheeled Walker  Activity: Other  Assist Level: Minimal assistance  Functional Mobility Comments: Min X2 progressing to CGA with verbal instruction with good follow through  ADL  Feeding: Independent  Grooming: Modified independent   UE Bathing: Stand by assistance; Increased time to complete  LE Bathing: Contact guard assistance; Increased time to complete  UE Dressing: Increased time to complete;Contact guard assistance(OT facilitated doffing/donning gown sitting EOB)  LE Dressing: Minimal assistance; Increased time to complete(OT facilitated donning socks sitting EOB;  Pt unable to complete d/t bed being too high and decreased ROM to distal LE's;)  Toileting: Contact guard assistance; Increased time to complete  Tone RUE  RUE Tone: Normotonic  Tone LUE  LUE Tone: Normotonic  Coordination  Movements Are Fluid And Coordinated: Yes     Bed mobility  Supine to Sit: Minimal assistance  Scooting: Minimal assistance  Comment: Increased time to complete with HOB raised; retired to bedside recliner at end of session  Transfers  Sit to stand: Minimal assistance;2 Person assistance  Stand to sit: Minimal assistance  Transfer Comments: verbal cues for hand placement     Cognition  Overall Cognitive Status: Exceptions  Following Commands: Follows one step commands with repetition; Follows one step commands with increased time  Attention Span: Attends with cues to redirect  Safety Judgement: Decreased awareness of need for assistance;Decreased awareness of need for safety  Problem Solving: Decreased awareness of errors;Assistance required to implement solutions  Insights: Decreased awareness of deficits  Initiation: Requires cues for some  Sequencing: Requires cues for some  Cognition Comment: Pt very [place] with fair/poor follow through of directions for safety; requiring VC and tactile cues        Sensation  Overall Sensation Status: WFL        LUE AROM : WFL  LUE General AROM: decreased shoulder flexion ~80 degrees;   Left Hand AROM: WFL  RUE AROM : WFL  RUE General AROM: decreased shoulder flexion ~90 degree  Right Hand AROM: WFL  LUE Strength  Gross LUE Strength: Exceptions to [facility]  L Shoulder Horiz ABduction: 4+/5  L Shoulder Horiz ADduction: 4+/5  L Elbow Flex: 4+/5  L Elbow Ext: 4+/5  L Hand General: 4+/5  RUE Strength  Gross RUE Strength: Exceptions to [facility]  R Shoulder Horiz ABduction: 4+/5  R Shoulder Horiz ADduction: 4+/5  R Elbow Flex: 4+/5  R Elbow Ext: 4+/5  R Hand General: 4+/5              Plan   Plan  Times per week: 3-4x/wk  Current Treatment Recommendations: Strengthening, Balance Training, Functional Mobility Training, Equipment Evaluation, Education, & procurement, Patient/Caregiver Education & Training, Endurance Training, Home Management Training, Self-Care / ADL, Safety Education & Training    AM-PAC Score        AM-PAC Inpatient Daily Activity Raw Score: 20 (08/12/20 1503)  AM-PAC Inpatient ADL T-Scale Score : 42.03 (08/12/20 1503)  ADL Inpatient CMS 0-100% Score: 38.32 (08/12/20 1503)  ADL Inpatient CMS G-Code Modifier : Deanna Grumbling (08/12/20 1503)    Goals  Short term goals  Time Frame for Short term goals: Patient will, by discharge  Short term goal 1: demo UB ADLs at [facility]  Short term goal 2: demo LB ADLs at [facility] using AE PRN  Short term goal 3: demo functional transfers/mobility at Supervision using LRD to engage in ADLs safely  Short term goal 4: demo ~8 min of dynamic standing balance at SBA to engage in ADLs safely       Therapy Time   Individual Concurrent Group Co-treatment   Time In 1413         Time Out 1442         Minutes 29         Timed Code Treatment Minutes: Kev Ponce [place] [address], OTR/L

## 2020-08-12 NOTE — PROGRESS NOTES
Called to bedside by DERRICK Apple, pt states she is short of breath and the masks, NRB or simple, are making her feel worse. High Flow nasal cannula applied. Pt tolerated well with coaching, but still appears very agitated. Covering physician Jolie Marlow informed, one-time ativan ordered, ABG, and stat CXR. Pt appears like her WOB is easy but still states she is short of breath. DERRICK Apple updated.

## 2020-08-12 NOTE — PROGRESS NOTES
Spoke with daughter Liana Vargas, pt lives at home with son Junior Trinh in [place]. According to daughter there are only 2 steps to front door and pts bedroom/bathroom is on main floor. Daughter also stated that her mom has chronic leg and foot pain for the last several years. Also spoke with son Trista Wong, who will be picking her up tomorrow, would like to be notified as soon as possible of when discharge will happen.

## 2020-08-12 NOTE — PROGRESS NOTES
[facility]   Progress Note                 Date:   8/12/2020  Patient name:  Keagan De La Cruz  Date of admission:  8/10/2020 12:45 PM  MRN:   [id number]  YOB: 1940  PCP:    Elias Mike MD    Reason for Admission: Severe aortic stenosis [I35.0]  Severe aortic stenosis [I35.0]      Subjective:       Clinical Changes / Abnormalities:     Patient seen and examined. Post TAVR POD 1  No CP. Mild SOB on hiflo. LE pain - chronic      I/O last 3 completed shifts: In: 2152 [I.V.:1602; Blood:300; IV Piggyback:250]  Out: 4571 [Urine:1400; Blood:20]  No intake/output data recorded.       I/O since admission: -0.16 liters      Medications:   Scheduled Meds:    sodium chloride  20 mL Intravenous Once    sodium chloride flush  10 mL Intravenous 2 times per day    aspirin  81 mg Oral Daily    cefTRIAXone (ROCEPHIN) IV  1 g Intravenous Q24H    sodium chloride  250 mL Intravenous Once    atorvastatin  10 mg Oral Daily    albuterol sulfate HFA  1 puff Inhalation 4x Daily    fluticasone  1 puff Inhalation BID    metoprolol tartrate  12.5 mg Oral BID    glycopyrrolate-formoterol  1 puff Inhalation Daily    celecoxib  200 mg Oral BID    montelukast  10 mg Oral Nightly    clopidogrel  75 mg Oral Daily    furosemide  80 mg Oral BID    enoxaparin  1 mg/kg Subcutaneous Once    losartan  50 mg Oral Daily    hydroCHLOROthiazide  12.5 mg Oral Daily     Continuous Infusions:    sodium chloride 75 mL/hr at 08/11/20 0955    sodium chloride 75 mL/hr at 08/11/20 0619     CBC:   Recent Labs     08/11/20  0352 08/11/20 2122 08/12/20  0457   WBC 3.6 5.3 6.3   HGB 7.7* 8.9* 8.9*    162 170     BMP:    Recent Labs     08/10/20  2203 08/11/20  0352 08/11/20  1033 08/12/20  0457 08/12/20  0609    143 142 141  --    K 3.8 3.8 3.9 3.7  --     103 103 101  --    CO2 29 30 27 28  --    BUN 49* 48*  --  43*  --    CREATININE 1.68* 1.65*  --  1.60* 2.14*   GLUCOSE 76 66*  --  101*  -- Hepatic:   Recent Labs     08/11/20  0352   BILITOT 0.30     Troponin:   Recent Labs     08/11/20  1033 08/11/20  1703   TROPHS 315* 421*     BNP: No results for input(s): BNP in the last 72 hours. Lipids: No results for input(s): CHOL, HDL in the last 72 hours. Invalid input(s): LDLCALCU  INR:   Recent Labs     08/11/20  0352 08/12/20  0457   INR 1.5 1.6       Objective:   Vitals: BP (!) 143/55   Pulse 101   Temp 98.8 °F (37.1 °C) (Oral)   Resp 21   Ht 4' 11\" (1.499 m)   Wt 99 lb 3.3 oz (45 kg)   LMP  (LMP Unknown)   SpO2 100%   BMI 20.04 kg/m²   General appearance: Alert. No acute distress. HEENT: Head: Normal, normocephalic, atraumatic. Neck: no JVD, trachea midline  Lungs: Clear to auscultation bilaterally, no wheeze or crackles  Heart: Regular rate and rhythm, S1, S2 normal, no murmur  Abdomen: Soft, non-tender  Extremities: No edema  Neurologic: No focal neurologic deficits    ECHO: pending    CATH: 10/2019   Single vessel signbificant CAD, mid LAD 90%, required PTCA -ANA   Minimal disease in RCA and LCX   Lower normal LV functiion   Severe aortic stenosis with mean gradient 34 mmHg and valve area < 0.7 cm2   Abdominal aorta gramm and iliac arteries, suggest no serious stenosis    Assessment:   1. Severe AS (mean gradient 34 mmHg and valve area 0.7 cm2) s/p TAVR 29mm Corevalve on 8/11/20  2. Post op new LBBB. 3. CAD with PTCA -ANA LAD in Sept 2019  4. Preserved EF  5. Paroxysmal afib  6. Smoker  7. COPD  8. HTN  9. Cellulitis of left leg  10. Carotid artery disease  11. Peripheral vascular disease        Plan / Recommendations:   1. Continue aspirin, plavix, statin  2. Continue HCTZ 12.5 mg qd  3. Start lopressor 12.5 mg bid with parameters  4. IV lasix 40 mg bid (switched from po). Monitor I/Os. 5. Resume coumadin, dosing per pharmacy  6. Echo 48 hrs post op  7. Remove pacer. Not required overnight. Remove arterial line.    8. Consult pulmonary - on Hiflo, switched to IV diuresis    Thank you for allowing us to participate in [facility]. Electronically signed on 08/12/20 at 9:21 AM by:    Harvey Menendez MD   Fellow, [address] [facility]    Attending Cardiologist Addendum: I have reviewed and performed the history, physical, subjective, objective, assessment, and plan with the resident/fellow and agree with the note. I performed the history and physical personally. I have made changes to the note above as needed. Thank you for allowing me to participate in the care of this patient, please do not hesitate to call if you have any questions. Elisha Bautista DO, [facility] [place], [address] [facility]  [email or web address]  [phone]

## 2020-08-12 NOTE — CARE COORDINATION
Met with patient to discuss transitional planning.   Plan is home with [facility], will consider home care, given list

## 2020-08-12 NOTE — CONSULTS
blood rectal bleeding    Hyperlipidemia     Hypertension     Impaired mobility     cane    Kyphosis     Loss of weight     MI (myocardial infarction) (HCC)     x2    Osteoarthritis     Poor appetite     PVD (peripheral vascular disease) (HCC)     Superficial thrombosis of leg     Unspecified sinusitis (chronic)     Unspecified urinary incontinence     Unspecified vitamin D deficiency     Wears glasses      PAST SURGICAL HISTORY:        Procedure Laterality Date    APPENDECTOMY      CARDIAC CATHETERIZATION  10/14/2019    COLONOSCOPY      EYE SURGERY Bilateral 12[place]2015    [place]    HYSTERECTOMY, VAGINAL  8/13/1974    JOINT REPLACEMENT Right     Knee    MASTECTOMY Left     pt unsure of year, she was in her 64's     SHOULDER ARTHROPLASTY Left 2016    SIGMOIDOSCOPY N/A 2/27/2020    SIGMOIDOSCOPY DIAGNOSTIC FLEXIBLE performed by Felipe Pineda IV, DO at [facility]     FAMILY HISTORY:       Problem Relation Age of Onset    Other Mother         COPD    Arthritis Mother     Arthritis Father     Cancer Sister         breast    Heart Disease Brother      SOCIAL HISTORY:   TOBACCO:   reports that she has been smoking cigarettes. She has a 47.00 pack-year smoking history. She has never used smokeless tobacco.  ETOH:  reports no history of alcohol use. DRUGS: reports no history of drug use. ALLERGIES:    No Known Allergies      HOME MEDICATIONS:  Prior to Admission medications    Medication Sig Start Date End Date Taking?  Authorizing Provider   clopidogrel (PLAVIX) 75 MG tablet Take 75 mg by mouth daily 10/16/19  Yes Historical Provider, MD   metoprolol tartrate (LOPRESSOR) 25 MG tablet Take 25 mg by mouth 2 times daily 10/15/19  Yes Historical Provider, MD   umeclidinium-vilanterol (ANORO ELLIPTA) 62.5-25 MCG/INH AEPB inhaler Inhale 1 puff into the lungs daily 6/3/20  Yes Shannon Coats MD   albuterol (ACCUNEB) 1.25 MG/3ML nebulizer solution Inhale 1 ampule into the lungs every 6 hours as needed for Wheezing   Yes Historical Provider, MD   FLOVENT  MCG/ACT inhaler Inhale 1 puff into the lungs 2 times daily  7/28/15  Yes Historical Provider, MD   PROVENTIL  (90 BASE) MCG/ACT inhaler Inhale 1 puff into the lungs 4 times daily  4/15/15  Yes Historical Provider, MD   atorvastatin (LIPITOR) 10 MG tablet Take 10 mg by mouth daily. Yes Historical Provider, MD   montelukast (SINGULAIR) 10 MG tablet Take 10 mg by mouth nightly. Yes Historical Provider, MD   Acetaminophen (TYLENOL PO) Take  by mouth as needed.    Yes Historical Provider, MD   celecoxib (CELEBREX) 200 MG capsule Take 200 mg by mouth 2 times daily    Historical Provider, MD   losartan (COZAAR) 50 MG tablet Take 50 mg by mouth    Historical Provider, MD   atorvastatin (LIPITOR) 10 MG tablet Take 10 mg by mouth    Historical Provider, MD   furosemide (LASIX) 80 MG tablet Take 80 mg by mouth 2 times daily    Historical Provider, MD   hydroCHLOROthiazide (MICROZIDE) 12.5 MG capsule Take 12.5 mg by mouth    Historical Provider, MD   warfarin (COUMADIN) 1 MG tablet Take 1 mg by mouth daily monday    Historical Provider, MD   hydrochlorothiazide (MICROZIDE) 12.5 MG capsule Take 12.5 mg by mouth daily    Historical Provider, MD   metoprolol tartrate (LOPRESSOR) 25 MG tablet Take 0.5 tablets by mouth 2 times daily 10/15/19   MASON Dimas CNP   clopidogrel (PLAVIX) 75 MG tablet Take 1 tablet by mouth daily 10/16/19   MASON Dimas CNP   furosemide (LASIX) 40 MG tablet Take 80 mg by mouth 2 times daily     Historical Provider, MD   warfarin (COUMADIN) 4 MG tablet Take 2 mg by mouth daily [id number]    Historical Provider, MD     IMMUNIZATIONS:  Most Recent Immunizations   Administered Date(s) Administered    Influenza Virus Vaccine 01/01/2014    Pneumococcal Polysaccharide ([id number]) 10/28/2006       REVIEW OF SYSTEMS:  Review of Systems   Constitutional: Negative for soft, nontender, nondistended, no masses or organomegaly  Neurological - DTR's normal and symmetric, motor and sensory grossly normal bilaterally  Extremities - peripheral pulses normal, no pedal edema, no clubbing or cyanosis  Skin - normal coloration and turgor, no rashes, no suspicious skin lesions noted     DATA REVIEW     Medications: Current Inpatient  Scheduled Meds:   furosemide  40 mg Intravenous BID    metoprolol tartrate  12.5 mg Oral BID    potassium chloride  40 mEq Oral Once    heparin (porcine)  5,000 Units Subcutaneous 3 times per day    warfarin (COUMADIN) daily dosing (placeholder)   Other RX Placeholder    warfarin  2 mg Oral Once    sodium chloride  20 mL Intravenous Once    sodium chloride flush  10 mL Intravenous 2 times per day    aspirin  81 mg Oral Daily    cefTRIAXone (ROCEPHIN) IV  1 g Intravenous Q24H    sodium chloride  250 mL Intravenous Once    atorvastatin  10 mg Oral Daily    albuterol sulfate HFA  1 puff Inhalation 4x Daily    fluticasone  1 puff Inhalation BID    glycopyrrolate-formoterol  1 puff Inhalation Daily    celecoxib  200 mg Oral BID    montelukast  10 mg Oral Nightly    clopidogrel  75 mg Oral Daily    losartan  50 mg Oral Daily    hydroCHLOROthiazide  12.5 mg Oral Daily     Continuous Infusions:   sodium chloride 75 mL/hr at 08/11/20 0955    sodium chloride 75 mL/hr at 08/11/20 0619     INPUT/OUTPUT:  In: 752 [I.V.:752]  Out: 925 [Urine:925]  Date 08/12/20 0000 - 08/12/20 2359   Shift 8111-6064 5945-5868 6564-4464 24 Hour Total   INTAKE   I.V.(mL/kg) 105(2.3)   105(2.3)   Shift Total(mL/kg) 105(2.3)   105(2.3)   OUTPUT   Urine(mL/kg/hr) 575(1.6)   575   Shift Total(mL/kg) 575(12.8)   575(12.8)   Weight (kg) 45 45 45 45       LABS:-  ABG:   Recent Labs     08/12/20  0609   POCPH 7.462*   POCPCO2 42.9   POCPO2 89.2   POCHCO3 30.7*   [id number] 97     CBC:   Recent Labs     08/11/20  0352 08/11/20  2122 08/12/20  0457   WBC 3.6 5.3 6.3   HGB 7.7* 8.9* 8.9*   HCT 26.5* 27.9* 29.3*   MCV 91.7 87.2 88.5    162 170   RBC 2.89* 3.20* 3.31*   MCH 26.6 27.8 26.9   MCHC 29.1 31.9 30.4   RDW 20.7* 20.0* 19.8*     BMP:   Recent Labs     08/10/20  2203 08/11/20  0352 08/11/20  1033 08/12/20  0457 08/12/20  0609    143 142 141  --    K 3.8 3.8 3.9 3.7  --     103 103 101  --    CO2 29 30 27 28  --    BUN 49* 48*  --  43*  --    CREATININE 1.68* 1.65*  --  1.60* 2.14*   GLUCOSE 76 66*  --  101*  --      Liver Function Test:   Recent Labs     08/11/20  0352   LABALBU 2.4*   BILITOT 0.30     Amylase/Lipase:  No results for input(s): AMYLASE, LIPASE in the last 72 hours. Coagulation Profile:   Recent Labs     08/11/20  0352 08/12/20  0457   INR 1.5 1.6   PROTIME 15.4* 16.1*     Cardiac Enzymes:  No results for input(s): CKTOTAL, CKMB, CKMBINDEX, TROPONINI in the last 72 hours. Lactic Acid:  No results found for: LACTA  BNP:   No results found for: BNP  D-Dimer:  No results found for: DDIMER  Others:   No results found for: TSH, Q4OAIJX, W4YKBBM, THYROIDAB, FT3, T4FREE  No results found for: JONES, RHEUMFACTOR, SEDRATE, CRP  No results found for: Charleen Rodes  No results found for: IRON, TIBC, FERRITIN  No results found for: SPEP, UPEP  No results found for: PSA, CEA, , [id number],   Microbiology:    Pathology:    Radiology Reports:  XR CHEST PORTABLE   Final Result   Decreased left pleural effusion. Persistent moderate bilateral pleural   effusions and lung opacities. XR CHEST PORTABLE   Final Result   Findings compatible with pulmonary edema with small-moderate size pleural   effusions. Pulmonary Function test:    Polysomnogram:    Echocardiogram:   No results found for this or any previous visit. Cardiac Catheterization:   No results found for this or any previous visit. ASSESSMENT AND PLAN     Assessment:    // Active Problems: Aortic stenosis, severe  Resolved Problems:    * No resolved hospital problems. *  PVD  CAD s/p stent  COPD  afib with anticoagulation  Thrombocytopenia  CKD Stage III  HTN    Plan:  Post TAVR continue diuresis lasix IV 40 BID  Betablockers, antiplatelets, statins, anticoagulation per cardiology  singulair and bevespi, montelukast continue     Raj Joseph M.D. Pulmonary and Critical Care Medicine           8/12/2020, 2:58 PM    Please note that this chart was generated using voice recognition Dragon dictation software. Although every effort was made to ensure the accuracy of this automated transcription, some errors in transcription may have occurred.

## 2020-08-13 NOTE — PROGRESS NOTES
Pharmacy Note  Warfarin Consult follow-up      Recent Labs     08/13/20  0759   INR 1.4     Recent Labs     08/11/20  2122 08/12/20  0457 08/13/20  0759   HGB 8.9* 8.9* 8.4*   HCT 27.9* 29.3* 28.3*    170 146       Current warfarin drug-drug interactions: acetaminophen, ceftriaxone, celecoxib, clopidogrel, heparin    Date INR Dose   8/11/2020 1.5 none   8/12/2020 1.6 2mg   8/13/2020 1.4 3 mg       Notes:  Give 3 mg today on 8/13/2020. Dr Peña Barrera office clarified the patient's home dosing as 2 mg daily. Daily PT/INR while inpatient.      Mendy Schwartz PharmGUSTAVO 8/13/2020 10:22 AM

## 2020-08-13 NOTE — PROGRESS NOTES
Physical Therapy  Facility/Department: [facility] CAR 1  Daily Treatment Note  NAME: Markos Gonzalez  : 1940  MRN: [id number]    Date of Service: 2020    Discharge Recommendations:    Further therapy recommended at discharge. PT Equipment Recommendations  Equipment Needed: Yes  Mobility Devices: Jonathon Profit: Rolling      Assessment     Body structures, Functions, Activity limitations: Decreased functional mobility ; Decreased balance;Decreased strength;Decreased endurance; Increased pain  Assessment: The pt required Min A x2 to complete sit to stand and Min A x2 progressing to CGA to ambulate with RW. The pt currently requires 24hr physical assistance due to high risk of falls. Recommend continued physical therapy to maximize safety and progress toward prior level of independence. Prognosis: Good  Decision Making: Medium Complexity  PT Education: Goals;PT Role;Plan of Care; Functional Mobility Training  REQUIRES PT FOLLOW UP: Yes  Activity Tolerance  Activity Tolerance: Patient limited by endurance; Patient limited by pain         Patient Diagnosis(es): The primary encounter diagnosis was Aortic stenosis, severe. Diagnoses of Severe aortic stenosis and Atrial fibrillation, unspecified type [facility]) were also pertinent to this visit.      has a past medical history of Acute on chronic respiratory failure ([address] TIFFANIE (acute kidney injury) ([address] Anemia, Anticoagulated, Aortic valve disease, Asthma, Atonic bladder, Atrial fibrillation (HCC), Backache, unspecified, Blood in stool, Breast cancer, left ([address] Cachexia ([address] Chronic venous insufficiency, CKD (chronic kidney disease), COPD (chronic obstructive pulmonary disease) ([address] Coronary atherosclerosis of unspecified type of vessel, native or graft, Current smoker, Dry skin, Edema, Nevarez catheter present, Full dentures, History of blood transfusion, Hyperlipidemia, Hypertension, Impaired mobility, Kyphosis, Loss of weight, MI (myocardial infarction) ([address] Osteoarthritis, Poor appetite, PVD (peripheral vascular disease) ([facility] Superficial thrombosis of leg, Unspecified sinusitis (chronic), Unspecified urinary incontinence, Unspecified vitamin D deficiency, and Wears glasses. has a past surgical history that includes Hysterectomy, vaginal (8/13/1974); Appendectomy; Mastectomy (Left); joint replacement (Right); eye surgery (Bilateral, 12/2015); Total shoulder arthroplasty (Left, 2016); Colonoscopy; Cardiac catheterization (10/14/2019); and sigmoidoscopy (N/A, 2/27/2020). Restrictions  Restrictions/Precautions  Restrictions/Precautions: Bedrest with Bathroom Privileges, Fall Risk  Required Braces or Orthoses?: No  Position Activity Restriction  Other position/activity restrictions: TAVR 8/10  Subjective   Pt sitting up in her chair. Pt has no c/o pain. Orientation    Overall Orientation Status: Within Functional Limits  Objective     Transfers  Sit to Stand: Minimal Assistance;2 Person Assistance  Stand to sit: Minimal Assistance  Comment: Verbal cues for UE placement with fair return  Ambulation  Ambulation?: Yes  Ambulation 1  Surface: level tile  Device: Rolling Walker  Assistance: Minimal assistance(Min A x2 progressing to CGA)  Quality of Gait: antalgic, decreased LLE WB  Gait Deviations: Decreased step length;Decreased step height;Decreased arm swing;Decreased head and trunk rotation; Slow Saray; Shuffles  Distance: 100ft  Stairs/Curb  Stairs?: No   Balance  Posture: Poor  Sitting - Static: Good;-  Sitting - Dynamic: Fair;+  Standing - Static: Fair  Standing - Dynamic: Fair;-  Comments: standing balance assessed with RW     Goals  Short term goals  Time Frame for Short term goals: 14 visits  Short term goal 1: Perform bed mobility and functional transfers independently  Short term goal 2: Ambulate 300ft with RW and SBA  Short term goal 3: Participate in 30 minutes of therapy to demo increased endurance  Short term goal 4: Demo Fair+ dynamic standing balance to decrease risk of falls  Short term goal 5: Ascend/descend 3 steps with HR and Min A    Plan    Plan  Times per week: 5-6x/wk  Current Treatment Recommendations: Strengthening, ROM, Balance Training, Functional Mobility Training, Transfer Training, Gait Training, Endurance Training, Home Exercise Program, Safety Education & Training, Patient/Caregiver Education & Training, Stair training  Safety Devices  Type of devices: Nurse notified, Call light within reach, Gait belt, Left in chair, Chair alarm in place  Restraints  Initially in place: No     Therapy Time   Individual Concurrent Group Co-treatment   Time In  320         Time Out  345         Minutes  [facility]

## 2020-08-13 NOTE — CARE COORDINATION
Urology Update:    Urine culture and sensitivities reviewed from 8/11/2020. Patient has been treated for acute UTI with IV Rocephin since 8/11/2020. Urology recommendation is to transition to PO Keflex 500mg Q12 hours for 5 days, can begin today. Please continue to monitor patient's PT/INR if she remains on Warfarin. She should follow up by 9/10/2020 with Dr. Chung Tijerina office for her next catheter exchange, as well as to ensure she has recovered from UTI.     Elisa Sanchez PA-C  Urology Service   8/13/2020 2:24 PM

## 2020-08-13 NOTE — PROGRESS NOTES
[facility]   Progress Note                 Date:   8/13/2020  Patient name:  Clarissa Perez  Date of admission:  8/10/2020 12:45 PM  MRN:   [id number]  YOB: 1940  PCP:    Dank Bales MD    Reason for Admission: Severe aortic stenosis [I35.0]  Severe aortic stenosis [I35.0]      Subjective:       Clinical Changes / Abnormalities:     Patient seen and examined. Post TAVR POD 2  No CP. Hiflo weaned off yesterday. Pacer removed yesterday. Labs pending  HR 60-70s. I/O last 3 completed shifts: In: 4183 [P.O.:720; I.V.:427]  Out: 1100 [Urine:1100]  No intake/output data recorded.       I/O since admission: -0.121 liters      Medications:   Scheduled Meds:    furosemide  40 mg Intravenous BID    metoprolol tartrate  12.5 mg Oral BID    potassium chloride  40 mEq Oral Once    heparin (porcine)  5,000 Units Subcutaneous 3 times per day    warfarin (COUMADIN) daily dosing (placeholder)   Other RX Placeholder    sodium chloride  20 mL Intravenous Once    sodium chloride flush  10 mL Intravenous 2 times per day    aspirin  81 mg Oral Daily    cefTRIAXone (ROCEPHIN) IV  1 g Intravenous Q24H    sodium chloride  250 mL Intravenous Once    atorvastatin  10 mg Oral Daily    albuterol sulfate HFA  1 puff Inhalation 4x Daily    fluticasone  1 puff Inhalation BID    glycopyrrolate-formoterol  1 puff Inhalation Daily    celecoxib  200 mg Oral BID    montelukast  10 mg Oral Nightly    clopidogrel  75 mg Oral Daily    losartan  50 mg Oral Daily    hydroCHLOROthiazide  12.5 mg Oral Daily     Continuous Infusions:    sodium chloride 75 mL/hr at 08/11/20 0955    sodium chloride 75 mL/hr at 08/11/20 0619     CBC:   Recent Labs     08/11/20  0352 08/11/20  2122 08/12/20  0457   WBC 3.6 5.3 6.3   HGB 7.7* 8.9* 8.9*    162 170     BMP:    Recent Labs     08/10/20  2203 08/11/20  0352 08/11/20  1033 08/12/20  0457 08/12/20  0609    143 142 141  --    K 3.8 3.8 3.9 3.7 --     103 103 101  --    CO2 29 30 27 28  --    BUN 49* 48*  --  43*  --    CREATININE 1.68* 1.65*  --  1.60* 2.14*   GLUCOSE 76 66*  --  101*  --      Hepatic:   Recent Labs     08/11/20  0352   BILITOT 0.30     Troponin:   Recent Labs     08/11/20  1033 08/11/20  1703   TROPHS 315* 421*     BNP: No results for input(s): BNP in the last 72 hours. Lipids: No results for input(s): CHOL, HDL in the last 72 hours. Invalid input(s): LDLCALCU  INR:   Recent Labs     08/11/20  0352 08/12/20  0457   INR 1.5 1.6       Objective:   Vitals: /69   Pulse 70   Temp 97.6 °F (36.4 °C) (Oral)   Resp 14   Ht 4' 11\" (1.499 m)   Wt 121 lb 7.6 oz (55.1 kg)   LMP  (LMP Unknown)   SpO2 100%   BMI 24.53 kg/m²   General appearance: Alert. No acute distress. HEENT: Head: Normal, normocephalic, atraumatic. Neck: no JVD, trachea midline  Lungs: Clear to auscultation bilaterally, no wheeze or crackles  Heart: Regular rate and rhythm, S1, S2 normal, no murmur  Abdomen: Soft, non-tender  Extremities: No edema  Neurologic: No focal neurologic deficits    ECHO: pending read    CATH: 10/2019   Single vessel signbificant CAD, mid LAD 90%, required PTCA -ANA   Minimal disease in RCA and LCX   Lower normal LV functiion   Severe aortic stenosis with mean gradient 34 mmHg and valve area < 0.7 cm2   Abdominal aorta gramm and iliac arteries, suggest no serious stenosis    Assessment:   1. Severe AS (mean gradient 34 mmHg and valve area 0.7 cm2) s/p TAVR 29mm Corevalve on 8/11/20  2. Post op new LBBB. 3. CAD with PTCA -ANA LAD in Sept 2019  4. Preserved EF  5. Paroxysmal afib  6. Smoker  7. COPD  8. HTN  9. Cellulitis of left leg  10. Carotid artery disease  11. Peripheral vascular disease        Plan / Recommendations:   1. Continue plavix, statin. Will dc aspirin. 2. Continue HCTZ 12.5 mg qd and losartan 50 mg.   3. Continue lopressor 12.5 mg bid with parameters  4. IV lasix 40 mg bid (switched from po).  Monitor I/Os.  5. Resume coumadin, dosing per pharmacy. No bridging. 6. Echo read pending    Thank you for allowing us to participate in [facility]. Electronically signed on 08/13/20 at 8:19 AM by:    Mone Jimenez MD   Fellow, [address] [place] [address]      Attending Physician Statement  I have discussed the care of Blade Hoang, including pertinent history and exam findings,  with the cardiology fellow/resident. I have seen and examined the patient and the key elements of all parts of the encounter have been performed by me.      Robbie Trevino MD, [facility] [place]

## 2020-08-13 NOTE — PROGRESS NOTES
frequency/urgency  Musculoskeletal: negative for joint pain or joint swelling  Neurological: negative for numbness/tingling, seizures or weakness  Dermatological: negative for pruritus or rash    OBJECTIVE     VITAL SIGNS:   LAST-  /64   Pulse 62   Temp 98 °F (36.7 °C) (Oral)   Resp 22   Ht 4' 11\" (1.499 m)   Wt 121 lb 7.6 oz (55.1 kg)   LMP  (LMP Unknown)   SpO2 100%   BMI 24.53 kg/m²   8-24 HR RANGE-  TEMP Temp  Av.3 °F (36.8 °C)  Min: 97.6 °F (36.4 °C)  Max: 99.6 °F (04.4 °C)   BP Systolic (57SLA), FHR:425 , Min:98 , RDD:444      Diastolic (95HCT), MWM:86, Min:52, Max:71     PULSE Pulse  Av.3  Min: 62  Max: 74   RR Resp  Av.5  Min: 20  Max: 24   O2 SAT SpO2  Av.3 %  Min: 97 %  Max: 100 %   OXYGEN DELIVERY O2 Flow Rate (L/min)  Avg: 3.3 L/min  Min: 3 L/min  Max: 4 L/min     Systemic Examination:   Physical Exam  General appearance - looks comfortable and in no acute distress  Mental status - alert, oriented to person, place, and time  Eyes - pupils equal and reactive, extraocular eye movements intact  Mouth - mucous membranes moist, pharynx normal without lesions  Neck - supple, no significant adenopathy  Chest - Chest was symmetrical without dullness to percussion. Breath sounds bilaterally were clear to auscultation. There were no wheezes, rhonchi or rales.   There is no intercostal recession or use of accessory muscles  Heart - normal rate, regular rhythm, normal S1, S2, no murmurs, rubs, clicks or gallops  Abdomen - soft, nontender, nondistended, no masses or organomegaly  Neurological - alert, oriented, normal speech, no focal findings or movement disorder noted  Extremities - peripheral pulses normal, no pedal edema, no clubbing or cyanosis  Skin - normal coloration and turgor, no rashes, no suspicious skin lesions noted     DATA REVIEW     Medications:  Scheduled Meds:   warfarin  3 mg Oral Once    furosemide  40 mg Intravenous BID    metoprolol tartrate  12.5 mg Oral BID    potassium chloride  40 mEq Oral Once    heparin (porcine)  5,000 Units Subcutaneous 3 times per day    warfarin (COUMADIN) daily dosing (placeholder)   Other RX Placeholder    sodium chloride  20 mL Intravenous Once    sodium chloride flush  10 mL Intravenous 2 times per day    cefTRIAXone (ROCEPHIN) IV  1 g Intravenous Q24H    sodium chloride  250 mL Intravenous Once    atorvastatin  10 mg Oral Daily    albuterol sulfate HFA  1 puff Inhalation 4x Daily    fluticasone  1 puff Inhalation BID    glycopyrrolate-formoterol  1 puff Inhalation Daily    celecoxib  200 mg Oral BID    montelukast  10 mg Oral Nightly    clopidogrel  75 mg Oral Daily    losartan  50 mg Oral Daily    hydroCHLOROthiazide  12.5 mg Oral Daily     Continuous Infusions:   sodium chloride 75 mL/hr at 08/11/20 0955    sodium chloride 75 mL/hr at 08/11/20 0619     LABS:-  ABG:   Recent Labs     08/12/20  0609   POCPH 7.462*   POCPCO2 42.9   POCPO2 89.2   POCHCO3 30.7*   [id number] 97     CBC:   Recent Labs     08/11/20  2122 08/12/20  0457 08/13/20  0759   WBC 5.3 6.3 5.7   HGB 8.9* 8.9* 8.4*   HCT 27.9* 29.3* 28.3*   MCV 87.2 88.5 92.5    170 146   RBC 3.20* 3.31* 3.06*   MCH 27.8 26.9 27.5   MCHC 31.9 30.4 29.7   RDW 20.0* 19.8* 19.9*     BMP:   Recent Labs     08/11/20  0352 08/11/20  1033 08/12/20  0457 08/12/20  0609 08/13/20  0759    142 141  --  140   K 3.8 3.9 3.7  --  4.3    103 101  --  101   CO2 30 27 28  --  29   BUN 48*  --  43*  --  43*   CREATININE 1.65*  --  1.60* 2.14* 1.55*   GLUCOSE 66*  --  101*  --  85     Liver Function Test:   Recent Labs     08/11/20  0352   LABALBU 2.4*   BILITOT 0.30     Amylase/Lipase:  No results for input(s): AMYLASE, LIPASE in the last 72 hours.   Coagulation Profile:   Recent Labs     08/11/20  0352 08/12/20  0457 08/13/20  0759   INR 1.5 1.6 1.4   PROTIME 15.4* 16.1* 14.7*     Cardiac Enzymes:  No results for input(s): CKTOTAL, CKMB, CKMBINDEX, TROPONINI in the last 72 hours. Lactic Acid:  No results found for: LACTA  BNP:   No results found for: BNP  D-Dimer:  No results found for: DDIMER  Others:   No results found for: TSH, L3FPTPB, F9YKYPO, THYROIDAB, FT3, T4FREE  No results found for: JONES, RHEUMFACTOR, SEDRATE, CRP  No results found for: LABURIC, URICACID  No results found for: IRON, TIBC, FERRITIN  No results found for: SPEP, UPEP  No results found for: PSA, CEA, , [id number],     Input/Output:    Intake/Output Summary (Last 24 hours) at 8/13/2020 1421  Last data filed at 8/13/2020 0600  Gross per 24 hour   Intake 727 ml   Output 500 ml   Net 227 ml       Microbiology:  Urine Culture:  No components found for: CURINE  Blood Culture:  No components found for: CBLOOD, CFUNGUSBL  Recent Labs     08/11/20  1045   [facility] . URINE   SPECIAL NOT REPORTED   CULTURE ESCHERICHIA COLI >[id number] CFU/ML*       Pathology:    Radiology images:    Radiology reports:  XR CHEST PORTABLE   Final Result   Bilateral pleural effusions with bibasilar pulmonary opacities, not   significantly changed suggesting atelectasis or pneumonia         XR CHEST PORTABLE   Final Result   Decreased left pleural effusion. Persistent moderate bilateral pleural   effusions and lung opacities. XR CHEST PORTABLE   Final Result   Findings compatible with pulmonary edema with small-moderate size pleural   effusions. Echocardiogram:   No results found for this or any previous visit. Cardiac Catheterization:   No results found for this or any previous visit. ASSESSMENT AND PLAN     Assessment:    // Active Problems: Aortic stenosis, severe  Resolved Problems:    * No resolved hospital problems.  *  PVD  CAD s/p stent  COPD  afib with anticoagulation  Thrombocytopenia  CKD Stage III  HTN    Plan:  Post TAVR continue diuresis lasix IV 40 BID  Betablockers, antiplatelets, statins, anticoagulation per cardiology  singulair and bevespi, montelukast continue     I updated the patient regarding the current clinical condition, provisional diagnosis and management plan. I addressed concerns and answered all questions to the best of my abilities. It was my pleasure to evaluate Es Fisher today. We will continue to follow. I would like to thank you for allowing me to participate in the care of this patient. Please feel free to call with any further questions or concerns. Flora Perez M.D. Pulmonary and Critical Care Medicine           8/13/2020, 2:21 PM    Please note that this chart was generated using voice recognition Dragon dictation software. Although every effort was made to ensure the accuracy of this automated transcription, some errors in transcription may have occurred.

## 2020-08-13 NOTE — DISCHARGE SUMMARY
[facility]  Discharge Note                 Name:  Cyrus Hawkins  YOB: 1940  Social Security Number:  [id number]  Medical Record Number:  [id number]    Date of Admission:  8/10/2020  Date of Discharge:  2020    Admitting physician: Randell Michel MD    Discharge Attending: Dianna Bender MD  Primary Care Physician: Marla Ureña MD  Consultants: Cardiology  Discharge to Home  Condition at discharge: [address] LIST:  Patient Active Problem List   Diagnosis    Peripheral vascular disease [facility]    Dermatophytosis of nail    Corns and callosities    Peripheral venous insufficiency    HT (hammer toe)    Carotid artery stenosis    Swelling of left lower extremity    Cellulitis of left lower limb    History of angioplasty    Aortic stenosis, severe    COPD (chronic obstructive pulmonary disease) ([facility]    Presence of stent in coronary artery in patient with coronary artery disease    Smoker unmotivated to quit    A-fib ([facility]    Anticoagulated    Urinary retention    Atonic bladder    Hypertension    Thrombocytopenia ([facility]    Stage 3 chronic kidney disease ([facility]    Kyphosis    Coronary artery disease involving native coronary artery without angina pectoris         Procedures:TAVR    HOSPITAL COURSE :           The patient was admitted for:   Hospital Procedures if any: TAVR  Medications changes recommendation: Please see below        Discharge exam:   Vitals:    20 1133   BP:    Pulse:    Resp: 22   Temp:    SpO2: 100%     Neuro: normal  Chest: Clear to ausculation. No wheezing. Cardiac: Cor RRR  Abdomen/groin: soft, non-tender, without masses or organomegaly  Lower extremity edema: none     Follow up with primary care provider 1 week  Follow up with cardiology 4 weeks  Follow up with other consultant physicians at their directions.     Discharge Medications:   Domo Ho   Home Medication Instructions LF Printed on:08/13/20 2052   Medication Information                      Acetaminophen (TYLENOL PO)  Take  by mouth as needed. albuterol (ACCUNEB) 1.25 MG/3ML nebulizer solution  Inhale 1 ampule into the lungs every 6 hours as needed for Wheezing             atorvastatin (LIPITOR) 10 MG tablet  Take 10 mg by mouth daily. celecoxib (CELEBREX) 200 MG capsule  Take 200 mg by mouth 2 times daily             cephALEXin (KEFLEX) 500 MG capsule  Take 1 capsule by mouth 2 times daily             clopidogrel (PLAVIX) 75 MG tablet  Take 1 tablet by mouth daily             enoxaparin (LOVENOX) 100 MG/ML injection  Inject 0.4 mLs into the skin 2 times daily for 3 days Start on Friday, 8/7/20. FLOVENT  MCG/ACT inhaler  Inhale 1 puff into the lungs 2 times daily              furosemide (LASIX) 80 MG tablet  Take 80 mg by mouth 2 times daily             hydrochlorothiazide (MICROZIDE) 12.5 MG capsule  Take 12.5 mg by mouth daily             losartan (COZAAR) 50 MG tablet  Take 50 mg by mouth             metoprolol tartrate (LOPRESSOR) 25 MG tablet  Take 0.5 tablets by mouth 2 times daily             montelukast (SINGULAIR) 10 MG tablet  Take 10 mg by mouth nightly.              PROVENTIL  (90 BASE) MCG/ACT inhaler  Inhale 1 puff into the lungs 4 times daily              umeclidinium-vilanterol (ANORO ELLIPTA) 62.5-25 MCG/INH AEPB inhaler  Inhale 1 puff into the lungs daily             warfarin (COUMADIN) 1 MG tablet  Take 1 mg by mouth daily monday             warfarin (COUMADIN) 4 MG tablet  Take 2 mg by mouth daily [id number]                Current Facility-Administered Medications: warfarin (COUMADIN) tablet 3 mg, 3 mg, Oral, Once  potassium chloride (KLOR-CON M) extended release tablet 40 mEq, 40 mEq, Oral, PRN **OR** potassium bicarb-citric acid (EFFER-K) effervescent tablet 40 mEq, 40 mEq, Oral, PRN **OR** potassium chloride 10 mEq/100 mL IVPB (Peripheral Line), 10 mEq, Intravenous, PRN  furosemide (LASIX) injection 40 mg, 40 mg, Intravenous, BID  metoprolol tartrate (LOPRESSOR) tablet 12.5 mg, 12.5 mg, Oral, BID  potassium chloride (KLOR-CON M) extended release tablet 40 mEq, 40 mEq, Oral, Once  heparin (porcine) injection 5,000 Units, 5,000 Units, Subcutaneous, 3 times per day  warfarin (COUMADIN) daily dosing (placeholder), , Other, RX Placeholder  0.9 % sodium chloride bolus, 20 mL, Intravenous, Once  0.9 % sodium chloride infusion, , Intravenous, Continuous  sodium chloride flush 0.9 % injection 10 mL, 10 mL, Intravenous, 2 times per day  sodium chloride flush 0.9 % injection 10 mL, 10 mL, Intravenous, PRN  acetaminophen (TYLENOL) tablet 650 mg, 650 mg, Oral, Q4H PRN  labetalol (NORMODYNE;TRANDATE) injection 10 mg, 10 mg, Intravenous, Q30 Min PRN  cefTRIAXone (ROCEPHIN) 1 g IVPB in 50 mL D5W minibag, 1 g, Intravenous, Q24H  oxyCODONE (ROXICODONE) immediate release tablet 5 mg, 5 mg, Oral, Q6H PRN  0.9 % sodium chloride bolus, 250 mL, Intravenous, Once  0.9 % sodium chloride infusion, , Intravenous, Continuous  atorvastatin (LIPITOR) tablet 10 mg, 10 mg, Oral, Daily  albuterol sulfate  (90 Base) MCG/ACT inhaler 1 puff, 1 puff, Inhalation, 4x Daily  fluticasone (FLOVENT HFA) 220 MCG/ACT inhaler 1 puff, 1 puff, Inhalation, BID  albuterol (PROVENTIL) nebulizer solution 1.25 mg, 1.25 mg, Nebulization, Q6H PRN  glycopyrrolate-formoterol (BEVESPI) 9-4.8 MCG/ACT inhaler 1 puff, 1 puff, Inhalation, Daily  celecoxib (CELEBREX) capsule 200 mg, 200 mg, Oral, BID  montelukast (SINGULAIR) tablet 10 mg, 10 mg, Oral, Nightly  clopidogrel (PLAVIX) tablet 75 mg, 75 mg, Oral, Daily  albuterol sulfate  (90 Base) MCG/ACT inhaler 2 puff, 2 puff, Inhalation, Q6H PRN  losartan (COZAAR) tablet 50 mg, 50 mg, Oral, Daily  hydroCHLOROthiazide (MICROZIDE) capsule 12.5 mg, 12.5 mg, Oral, Daily  hydrALAZINE (APRESOLINE) injection 10 mg, 10 mg, Intravenous, Q6H PRN    Coronary Discharge Core Measure: Please indicate the medication given by X, and if not the reasons not given:    Not Given Reason  Given      Beta Blockers x      ACE-I x      Statins x      ASA       OAP (Plavix/Effient/Brilinta) x     Patient admitted for elective TAVR. Stable during hospitalization. Will discharge home per patient preference. Continue plavix, coumadin and lovenox bridge for 3 days. She will follow up with her primary care doctor for INR check and coumadin dosing early next week. Prescribed Keflex 500 mg bid per urology recommendations for UTI along with outpatient follow up. Discussed with patient and nursing. Medications and discharge instructions reviewed with patient and nursing.     Electronically signed by Korey Cooley MD on 8/13/2020 at 3:12 PM  [address] [place] [address] [facility]

## 2020-08-13 NOTE — PROGRESS NOTES
CLINICAL PHARMACY NOTE: MEDS TO [address] Select Patient?: No  Total # of Prescriptions Filled: 2   The following medications were delivered to the patient:  · Enoxaparin, cephalexin  Total # of Interventions Completed: 0  Time Spent (min): 0    Additional Documentation:

## 2020-08-13 NOTE — PROGRESS NOTES
Patient d/c. Paperwork and lap script gone over with patient and son Oneal Soulier. Patient understands lovenox shot and has done them to bridge for coumadin several times in the past, son Rolando Brown will be administering them to her daily. All questions answered and follow up gone over.

## 2020-08-13 NOTE — CARE COORDINATION
Transitional planning. Spoke with patient who said she does not need home care. She is going to go home with her daughter and she does not work. Pt states she will stay with her until she gets a little stronger and both her son and daughter can provide her with what she needs, including helping her walk. 1500 Per DERRICK Clemens, pt gets coumadin from Dr Francisca Turner and goes through him for her INR as well. Pt to get INR on Monday and  will write order for that and Adis Madrigal will give at discharge. She also needs lovenox injections to bridge with coumadin and she said her son Marcelina Dick will give her the shots and does not need home care. 0 PS Dr Kelle Fothergill to please print the INR order. Sintia Huston

## 2020-08-13 NOTE — PLAN OF CARE
Problem: Falls - Risk of:  Goal: Will remain free from falls  Description: Will remain free from falls  Outcome: Ongoing  Goal: Absence of physical injury  Description: Absence of physical injury  Outcome: Ongoing     Problem: Breathing Pattern - Ineffective:  Goal: Ability to achieve and maintain a regular respiratory rate will improve  Description: Ability to achieve and maintain a regular respiratory rate will improve  Outcome: Ongoing     Problem: Gas Exchange - Impaired:  Goal: Levels of oxygenation will improve  Description: Levels of oxygenation will improve  Outcome: Ongoing     Problem: Pain:  Goal: Pain level will decrease  Description: Pain level will decrease  Outcome: Ongoing  Goal: Control of acute pain  Description: Control of acute pain  Outcome: Ongoing  Goal: Control of chronic pain  Description: Control of chronic pain  Outcome: Ongoing     Problem: Skin Integrity:  Goal: Will show no infection signs and symptoms  Description: Will show no infection signs and symptoms  Outcome: Ongoing  Goal: Absence of new skin breakdown  Description: Absence of new skin breakdown  Outcome: Ongoing     Problem: Cardiac:  Goal: Ability to maintain vital signs within normal range will improve  Description: Ability to maintain vital signs within normal range will improve  Outcome: Ongoing  Goal: Cardiovascular alteration will improve  Description: Cardiovascular alteration will improve  Outcome: Ongoing     Problem: Health Behavior:  Goal: Will modify at least one risk factor affecting health status  Description: Will modify at least one risk factor affecting health status  Outcome: Ongoing  Goal: Identification of resources available to assist in meeting health care needs will improve  Description: Identification of resources available to assist in meeting health care needs will improve  Outcome: Ongoing     Problem: Physical Regulation:  Goal: Complications related to the disease process, condition or treatment will be avoided or minimized  Description: Complications related to the disease process, condition or treatment will be avoided or minimized  Outcome: Ongoing

## 2020-08-13 NOTE — PROGRESS NOTES
Patient refused turning on side, RN educated patient reasoning to prevent pressure ulcers. Patient states \"im going home tomorrow i'm not getting a bed sore here. \"

## 2020-08-17 NOTE — PROGRESS NOTES
CHF/HFpEF  -- Acute on Chronic Systolic and Diastolic CHF  -- Acute Systolic CHF/HFrEF  -- Acute Diastolic CHF/HFpEF  -- Acute Systolic and Diastolic CHF  -- Chronic Systolic CHF/HFrEF  -- Chronic Diastolic CHF/HFpEF  -- Chronic Systolic and Diastolic CHF  -- Other - I will add my own diagnosis  -- Disagree - Not applicable / Not valid  -- Disagree - Clinically unable to determine / Unknown  -- Refer to Clinical Documentation Reviewer    PROVIDER RESPONSE TEXT:    This patient has chronic diastolic CHF/HFpEF.     Query created by: Primo Cancer on 8/13/2020 9:07 AM      Electronically signed by:  Tyra Morin MD 8/17/2020 1:56 PM

## 2020-09-01 NOTE — PROGRESS NOTES
Following Dr. Gonzalez Cons of care:    Changed 16 FR catheter changed without difficulty. Once balloon was deflated, removed catheter in total without difficulty. Patient's vagina was cleansed with betadine. 16 FR ruiz was inserted without difficulty. Upon urine return, balloon inflated with 10cc sterile water. Ruiz catheter was hooked up to leg bag with straps. Patient instructed on catheter care including draining catheter bag and keeping catheter bag above the knee to prevent pulling on catheter causing blood.

## 2020-09-15 PROBLEM — D64.9 ANEMIA: Status: ACTIVE | Noted: 2020-01-01

## 2020-09-15 NOTE — ED PROVIDER NOTES
[facility] ED  Emergency Department Encounter  Emergency Medicine Resident     Pt Name: Romain Israel  MRN: [id number]  [place] 1940  Date of evaluation: 9/15/20  PCP:  Emile Yip MD    CHIEF COMPLAINT       Chief Complaint   Patient presents with    Shortness of Breath     SOB with BLE swelling. H/o COPD/CHF/A.fib. Denies chest pain, cough. Afebrile       HISTORY OFPRESENT ILLNESS  (Location/Symptom, Timing/Onset, Context/Setting, Quality, Duration, Modifying Factors,Severity.)      Romain Israel is a 67 y. o.yo female who presents with acute on chronic bilateral lower extremity edema. Patient comes with eldest son. Eldest son and patient states that patient has chronic bilateral lower extremity edema however over the past several weeks it has been increased. She has been taking her medications which include diuretic and antihypertensive. She is also on warfarin for history of A. fib. Last month she did have a TAVR. Denying any chest pain. Although shortness of breath was documented in the triage note, she is denying shortness of breath to me. No fever, chills, cough. History of mastectomy secondary to breast malignancy however this is been in remission for several years, no active malignancy that is known. She states she is also been experiencing frequent nosebleeds over the past several weeks. She states that she blows her nose and clots come out. She is not currently bleeding. She has not discussed this with her primary care provider. Currently in treatment for Shingles. Son is requesting that patient be placed on hospice. He states that his brother is mother's primary caregiver, who she lives with. She is not currently receiving home health. Currently not in a nursing home.     PAST MEDICAL / SURGICAL / SOCIAL / FAMILY HISTORY      has a past medical history of Acute on chronic respiratory failure ([address] TIFFANIE (acute kidney injury) ([address] Anemia, Anticoagulated, week: Not on file     Minutes per session: Not on file    Stress: Not on file   Relationships    Social connections     Talks on phone: Not on file     Gets together: Not on file     Attends Mandaeism service: Not on file     Active member of club or organization: Not on file     Attends meetings of clubs or organizations: Not on file     Relationship status: Not on file    Intimate partner violence     Fear of current or ex partner: Not on file     Emotionally abused: Not on file     Physically abused: Not on file     Forced sexual activity: Not on file   Other Topics Concern    Not on file   Social History Narrative    Not on file       Family History   Problem Relation Age of Onset    Other Mother         COPD    Arthritis Mother     Arthritis Father     Cancer Sister         breast    Heart Disease Brother         Allergies:  Patient has no known allergies. Home Medications:  Prior to Admission medications    Medication Sig Start Date End Date Taking?  Authorizing Provider   cephALEXin (KEFLEX) 500 MG capsule Take 1 capsule by mouth 2 times daily 8/13/20   Karli Hernandez MD   celecoxib (CELEBREX) 200 MG capsule Take 200 mg by mouth 2 times daily    Historical Provider, MD   losartan (COZAAR) 50 MG tablet Take 50 mg by mouth    Historical Provider, MD   furosemide (LASIX) 80 MG tablet Take 80 mg by mouth 2 times daily    Historical Provider, MD   umeclidinium-vilanterol (ANORO ELLIPTA) 62.5-25 MCG/INH AEPB inhaler Inhale 1 puff into the lungs daily 6/3/20   Dontrell Agee MD   warfarin (COUMADIN) 1 MG tablet Take 1 mg by mouth daily monday    Historical Provider, MD   hydrochlorothiazide (MICROZIDE) 12.5 MG capsule Take 12.5 mg by mouth daily    Historical Provider, MD   metoprolol tartrate (LOPRESSOR) 25 MG tablet Take 0.5 tablets by mouth 2 times daily 10/15/19   MASON Restrepo - CNP   clopidogrel (PLAVIX) 75 MG tablet Take 1 tablet by mouth daily 10/16/19   MASON Restrepo - CNP   albuterol (ACCUNEB) 1.25 MG/3ML nebulizer solution Inhale 1 ampule into the lungs every 6 hours as needed for Wheezing    Historical Provider, MD   FLOVENT  MCG/ACT inhaler Inhale 1 puff into the lungs 2 times daily  7/28/15   Historical Provider, MD   PROVENTIL  (90 BASE) MCG/ACT inhaler Inhale 1 puff into the lungs 4 times daily  4/15/15   Historical Provider, MD   atorvastatin (LIPITOR) 10 MG tablet Take 10 mg by mouth daily. Historical Provider, MD   warfarin (COUMADIN) 4 MG tablet Take 2 mg by mouth daily [id number]-Friday-sat-sun    Historical Provider, MD   montelukast (SINGULAIR) 10 MG tablet Take 10 mg by mouth nightly. Historical Provider, MD   Acetaminophen (TYLENOL PO) Take  by mouth as needed. Historical Provider, MD       REVIEW OFSYSTEMS    (2-9 systems for level 4, 10 or more for level 5)      Review of Systems   Constitutional: Negative for chills and fever. HENT: Positive for nosebleeds. Negative for congestion and rhinorrhea. Eyes: Negative for photophobia and visual disturbance. Respiratory: Negative for cough, shortness of breath and wheezing. Cardiovascular: Positive for leg swelling. Negative for chest pain and palpitations. Gastrointestinal: Negative for abdominal pain, nausea and vomiting. Genitourinary: Negative for frequency. Musculoskeletal: Negative for back pain. Skin: Positive for rash. Neurological: Negative for dizziness and headaches. PHYSICAL EXAM   (up to 7 for level 4, 8 or more forlevel 5)      INITIAL VITALS:   Vitals:    09/15/20 0902 09/15/20 0915 09/15/20 0920   BP: (!) 179/104     Pulse: 97  93   Resp:  26    Temp:   97.9 °F (36.6 °C)   TempSrc:   Oral   SpO2:  100% 100%   Weight:  121 lb (54.9 kg)        Physical Exam  Constitutional:       Comments: Cachectic   HENT:      Head: Normocephalic and atraumatic. Eyes:      Extraocular Movements: Extraocular movements intact.       Conjunctiva/sclera: Conjunctivae normal.   Neck:      Musculoskeletal: Normal range of motion and neck supple. Cardiovascular:      Rate and Rhythm: Normal rate. Rhythm irregular. Heart sounds: Normal heart sounds. Pulmonary:      Effort: Pulmonary effort is normal.      Breath sounds: Rhonchi present. Abdominal:      General: There is no distension. Palpations: Abdomen is soft. Tenderness: There is no abdominal tenderness. There is no guarding or rebound. Musculoskeletal: Normal range of motion. General: No deformity. Comments: 3+ pitting edema bilaterally with overlying venous stasis dermatitis   Skin:     Comments: Rash to anterior left chest   Neurological:      Mental Status: She is alert and oriented to person, place, and time. Mental status is at baseline. DIFFERENTIAL  DIAGNOSIS     PLAN (LABS / IMAGING / EKG):  Orders Placed This Encounter   Procedures    XR CHEST (2 VW)    Basic Metabolic Panel    Brain Natriuretic Peptide    CBC Auto Differential    Protime-INR    Troponin    Hemoglobin and Hematocrit, Blood, Post Transfusion    Troponin    Continuous Pulse Oximetry    Telemetry monitoring    VITAL SIGNS PER TRANSFUSION PROTOCOL    Verify informed consent    TRANSFUSION REACTION MANAGEMENT    Inpatient consult to Internal Medicine    Inpatient consult to Cardiology    EKG 12 Lead    TYPE AND SCREEN    PREPARE RBC (CROSSMATCH), 1 Units    PATIENT STATUS (FROM ED OR OR/PROCEDURAL) Inpatient       MEDICATIONS ORDERED:  Orders Placed This Encounter   Medications    furosemide (LASIX) injection 40 mg    AND Linked Order Group     pantoprazole (PROTONIX) injection 40 mg     sodium chloride (PF) 0.9 % injection 10 mL       Initial MDM/Plan: 67 y.o. female who presents with intermittent epistaxis for the past several weeks as well as acute on chronic bilateral lower extremity edema. History of A. fib on warfarin. Consider CHF exacerbation.   She is rhonchorous on auscultation and concern for pleural effusions. Will obtain x-ray. She is status post TAVR. Consider ACS as well and will pursue ACS work-up. She is not complaining of shortness of breath, cough, fever, chills, excess malaise, do not feel that COVID-19 is likely in this case. Also consider anemia given epistaxis and she has had a history of transfusions. We will follow-up CBC. Potential admission.     DIAGNOSTIC RESULTS / EMERGENCYDEPARTMENT COURSE / MDM     LABS:  Labs Reviewed   BASIC METABOLIC PANEL - Abnormal; Notable for the following components:       Result Value    BUN 43 (*)     CREATININE 1.68 (*)     Calcium 7.9 (*)     Anion Gap 8 (*)     GFR Non- 29 (*)     GFR  36 (*)     All other components within normal limits   BRAIN NATRIURETIC PEPTIDE - Abnormal; Notable for the following components:    Pro-BNP 13,461 (*)     All other components within normal limits   CBC WITH AUTO DIFFERENTIAL - Abnormal; Notable for the following components:    WBC 3.3 (*)     RBC 2.19 (*)     Hemoglobin 6.4 (*)     Hematocrit 21.9 (*)     RDW 18.4 (*)     Seg Neutrophils 66 (*)     Lymphocytes 21 (*)     Immature Granulocytes 2 (*)     Absolute Lymph # 0.71 (*)     All other components within normal limits   PROTIME-INR - Abnormal; Notable for the following components:    Protime 49.1 (*)     INR 5.0 (*)     All other components within normal limits   TROPONIN - Abnormal; Notable for the following components:    Troponin, High Sensitivity 86 (*)     All other components within normal limits   TROPONIN   TYPE AND SCREEN   PREPARE RBC (CROSSMATCH)         RADIOLOGY:  Xr Chest (2 Vw)    Result Date: 9/15/2020  EXAMINATION: TWO XRAY VIEWS OF THE CHEST 9/15/2020 9:37 am COMPARISON: 08/13/2020 HISTORY: ORDERING SYSTEM PROVIDED HISTORY: rhonchi TECHNOLOGIST PROVIDED HISTORY: rhonchi Reason for Exam: rhonchi Acuity: Unknown Type of Exam: Unknown FINDINGS: Examination is limited by low lung volumes and

## 2020-09-15 NOTE — ED PROVIDER NOTES
[facility]     Emergency Department     Faculty Note/ Attestation      Pt Name: Daniel Castro                                       MRN: [id number]  [place] 1940  Date of evaluation: 9/15/2020  Patients PCP:    Bashir Rodríguez MD    Attestation  I performed a history and physical examination of the patient/ or directly observed  and discussed management with the resident. I reviewed the residents note and agree with the documented findings and plan of care. Any areas of disagreement are noted on the chart. I was personally present for the key portions of any procedures. I have documented in the chart those procedures where I was not present during the key portions. I have reviewed the emergency nurses triage note. I agree with the chief complaint, past medical history, past surgical history, allergies, medications, social and family history as documented unless otherwise noted below. For Physician Assistant/ Nurse Practitioner cases/documentation I have personally evaluated this patient and have completed at least one if not all key elements of the E/M (history, physical exam, and MDM). Additional findings are as noted. Initial Screens:             Vitals:    Vitals:    09/15/20 0902   BP: (!) 179/104   Pulse: 97       Chief Complaint    No chief complaint on file. blood pressure is 179/104 (abnormal) and her pulse is 97.       DIAGNOSTIC RESULTS       RADIOLOGY:   XR CHEST (2 VW)    (Results Pending)     LABS:  Labs Reviewed   BASIC METABOLIC PANEL   BRAIN NATRIURETIC PEPTIDE   CBC WITH AUTO DIFFERENTIAL   PROTIME-INR   TROPONIN     EMERGENCY DEPARTMENT COURSE:     -------------------------    BP: (!) 179/104,  , Pulse: 97,      System Problem List     Patient Active Problem List   Diagnosis    Peripheral vascular disease ([facility]    Dermatophytosis of nail    Corns and callosities    Peripheral venous insufficiency    HT (hammer toe)    Carotid artery stenosis    Swelling of left lower extremity    Cellulitis of left lower limb    History of angioplasty    Aortic stenosis, severe    COPD (chronic obstructive pulmonary disease) (HCC)    Presence of stent in coronary artery in patient with coronary artery disease    Smoker unmotivated to quit    A-fib [facility]    Anticoagulated    Urinary retention    Atonic bladder    Hypertension    Thrombocytopenia (HCC)    Stage 3 chronic kidney disease ([facility]    Kyphosis    Coronary artery disease involving native coronary artery without angina pectoris     Comments  Chronic Prob List noted    Brought in by son who wants her in hospice/nursing facility  Also w/ BLE edema and frequent nose bleeds, on warfarin  C/O SOB  Labs, CXR, SW consult    hgb 6.4  Type and cross  Admit    EKG Interpretation    Interpreted by emergency department physician    Rhythm: atrial fibrillation - controlled  Rate: normal  Axis: normal  Ectopy: none  Conduction: normal  ST Segments: nonspecific, mild depression V5, V6  T Waves: non specific changes  Q Waves: none    Clinical Impression: atrial fibrillation (chronic), ST depression V5/V6    Sia England DO  Attending Emergency Physician     Sia England DO  09/15/20 [address] [facility]  09/21/20 5938

## 2020-09-15 NOTE — ED NOTES
Pt to ER with c/o SOB with BLE swelling. H/o COPD/CHF/A.fib. Denies chest pain, cough. Afebrile. Placed on full continuous monitor, no acute distress noted. Pt tachypneic.  Dr. Shivani Goldberg at bedside to evaluate the pt     Lyndsey Peck RN  09/15/20 2004

## 2020-09-15 NOTE — ED NOTES
Report given to [facility], [address] Tammie Flores, RN  09/15/20 1079

## 2020-09-15 NOTE — PROGRESS NOTES
Attending Physician Statement  I have discussed the case, including pertinent history and exam findings with the resident and the team.  I have seen and examined the patient and the key elements of the encounter have been performed by me. I agree with the assessment, plan and orders as documented by the resident. Review of Systems:   In addition to the pertinent positives and negatives as stated within HPI and the review of systems as documented in their notes, all other systems were reviewed when able to and are reported negative. 67years old female brought to the hospital because of increased lower extremity swelling. She is on Lasix at home but I am not sure how much compliant she is. She does have lower extremity edema. She is also on valacyclovir for shingles as per the medical records. Patient was found to be anemic as well. There is no active bleeding my opinion she has been having melanotic stools. GI was consulted in the emergency department. Admit as an inpatient. Start IV Protonix. Monitor hemoglobin. Transfuse PRBC and monitor  She has supratherapeutic INR and if any evidence of bleeding will reverse it after discussing with cardiology  Monitor renal function  We will attempt to get hold of the patient's son and get accurate history, if she is having epistasis being on Coumadin she will need ENT evaluation as well.   Started on nitro drip per cardiology  Follow-up on echocardiogram  Start iv lasix  Resume KAILEY Almazan MD  Attending Physician, Internal Medicine Service    Internal Medicine Residency Program  9/15/2020, 10:52 PM

## 2020-09-15 NOTE — CONSULTS
GASTROENTEROLOGY CONSULTATION NOTE      REASON FOR CONSULT: Melena, acute blood loss anemia    REQUESTING PHYSICIAN:  Maday Avalos MD    HISTORY OF PRESENT ILLNESS:    The patient is a 67 y.o. female with history of chronic A. fib, CAD on Coumadin who presents with melena and severe nosebleeds for the last 4 days. The patient states that since the past 4 days she has been having profuse amount of bleeding through her nose. She reports that the blood was 'pouring out\". She admits to swallowing large amounts of blood and started noticing black tarry stools as well. Denies any nausea, vomiting, fever, chills. Did feel lightheaded and dizzy at times. Denies any NSAID use. On presentation to the ED the patient was noted to have a hemoglobin of 6.5. Her baseline hemoglobin is 8-9 gm/dL range.     Medical History:   Past Medical History:   Diagnosis Date    Acute on chronic respiratory failure ([address]     TIFFANIE (acute kidney injury) ([address]     Anemia     Anticoagulated     Aortic valve disease 01/20/2020    dr Anna Saab     or 3/20/20     Asthma     Atonic bladder     Atrial fibrillation (HCC)     Backache, unspecified     Blood in stool     Breast cancer, left ([address] pt unsure of year, she was in her 64's    left    Cachexia ([address]     Chronic venous insufficiency     CKD (chronic kidney disease)     COPD (chronic obstructive pulmonary disease) ([address]     Coronary atherosclerosis of unspecified type of vessel, native or graft     dr Erendira Phan Current smoker     Dry skin     itching    Edema     Nevarez catheter present     Full dentures     History of blood transfusion 01/20/2020    2 units blood rectal bleeding    Hyperlipidemia     Hypertension     Impaired mobility     cane    Kyphosis     Loss of weight     MI (myocardial infarction) ([address]     x2    Osteoarthritis     Poor appetite     PVD (peripheral vascular disease) ([facility]     Superficial thrombosis of leg     Unspecified sinusitis (chronic)     Unspecified urinary incontinence     Unspecified vitamin D deficiency     Wears glasses        SURGICAL HISTORY:  Past Surgical History:   Procedure Laterality Date    APPENDECTOMY      CARDIAC CATHETERIZATION  10/14/2019    COLONOSCOPY      [place]    HYSTERECTOMY, VAGINAL  8/13/1974    JOINT REPLACEMENT Right     Knee    MASTECTOMY Left     pt unsure of year, she was in her 64's     SHOULDER ARTHROPLASTY Left 2016    SIGMOIDOSCOPY N/A 2/27/2020    SIGMOIDOSCOPY DIAGNOSTIC FLEXIBLE performed by Jessenia Pineda IV, DO at [facility]       MEDS:  Prior to Admission medications    Medication Sig Start Date End Date Taking?  Authorizing Provider   cephALEXin (KEFLEX) 500 MG capsule Take 1 capsule by mouth 2 times daily 8/13/20   Letty Calhoun MD   celecoxib (CELEBREX) 200 MG capsule Take 200 mg by mouth 2 times daily    Historical Provider, MD   losartan (COZAAR) 50 MG tablet Take 50 mg by mouth    Historical Provider, MD   furosemide (LASIX) 80 MG tablet Take 80 mg by mouth 2 times daily    Historical Provider, MD   umeclidinium-vilanterol (ANORO ELLIPTA) 62.5-25 MCG/INH AEPB inhaler Inhale 1 puff into the lungs daily 6/3/20   Luis Miguel De La Rosa MD   warfarin (COUMADIN) 1 MG tablet Take 1 mg by mouth daily monday    Historical Provider, MD   hydrochlorothiazide (MICROZIDE) 12.5 MG capsule Take 12.5 mg by mouth daily    Historical Provider, MD   metoprolol tartrate (LOPRESSOR) 25 MG tablet Take 0.5 tablets by mouth 2 times daily 10/15/19   MASON Kuo CNP   clopidogrel (PLAVIX) 75 MG tablet Take 1 tablet by mouth daily 10/16/19   MASON Kuo - CNP   albuterol (ACCUNEB) 1.25 MG/3ML nebulizer solution Inhale 1 ampule into the lungs every 6 hours as needed for Wheezing    Historical Provider, MD LANDAVENT  MCG/ACT inhaler Inhale 1 puff into the lungs 2 times daily  7/28/15   Historical Provider, MD   PROVENTIL  (90 BASE) MCG/ACT inhaler Inhale 1 puff into the lungs 4 times daily  4/15/15   Historical Provider, MD   atorvastatin (LIPITOR) 10 MG tablet Take 10 mg by mouth daily. Historical Provider, MD   warfarin (COUMADIN) 4 MG tablet Take 2 mg by mouth daily lhzf-kir-Yelryeyz-Friday-sat-sun    Historical Provider, MD   montelukast (SINGULAIR) 10 MG tablet Take 10 mg by mouth nightly. Historical Provider, MD   Acetaminophen (TYLENOL PO) Take  by mouth as needed. Historical Provider, MD     Scheduled Meds:   furosemide  40 mg Intravenous BID    metoprolol tartrate  25 mg Oral BID    atorvastatin  10 mg Oral Nightly     Continuous Infusions:   nitroGLYCERIN 80 mcg/min (09/15/20 1724)     PRN Meds:    No Known Allergies    SOCIAL HISTORY:   Social History     Socioeconomic History    Marital status:       Spouse name: Not on file    Number of children: Not on file    Years of education: Not on file    Highest education level: Not on file   Occupational History    Not on file   Social Needs    Financial resource strain: Not on file    Food insecurity     Worry: Not on file     Inability: Not on file    Transportation needs     Medical: Not on file     Non-medical: Not on file   Tobacco Use    Smoking status: Current Every Day Smoker     Packs/day: 1.00     Years: 47.00     Pack years: 47.00     Types: Cigarettes    Smokeless tobacco: Never Used    Tobacco comment: now 1/2 ppd; prev 1 pack   Substance and Sexual Activity    Alcohol use: No     Alcohol/week: 0.0 standard drinks    Drug use: No    Sexual activity: Not on file   Lifestyle    Physical activity     Days per week: Not on file     Minutes per session: Not on file    Stress: Not on file   Relationships    Social connections     Talks on phone: Not on file     Gets together: Not on file     Attends [employer] service: Not on Review   Recent Labs     09/15/20  0910 09/15/20  1324 09/15/20  1716   WBC 3.3*  --   --    HGB 6.4* 6.6* 6.1*   .0  --   --      --   --    INR 5.0*  --   --      --   --    K 4.6  --   --      --   --    CO2 31  --   --    BUN 43*  --   --    CREATININE 1.68*  --   --    GLUCOSE 83  --   --    CALCIUM 7.9*  --   --      Recent Labs     09/15/20  0910   INR 5.0*   PROTIME 49.1*       IMPRESSION: 28-year-old female with chronic A. fib on Coumadin, CAD who presents with significant nosebleeds, acute blood loss anemia with hemoglobin downtrending more than 2 g from baseline in the setting of iatrogenic coagulopathy with a INR of 5.0. Patient is hemodynamically stable. Suspect nosebleeds due to iatrogenic coagulopathy. 1. Acute blood loss anemia with hemoglobin downtrending from baseline. Most likely due to acute nosebleeds in the setting of iatrogenic coagulopathy with an INR of 5.0.  2. Melena due to ingestion of blood from the nasopharynx. 3. Iatrogenic coagulopathy      RECOMMENDATIONS:   1. Unlikely this is GI source of bleeding. 2. Please correct INR to within therapeutic range~2.0.  3. Monitor H&H and transfuse to keep hemoglobin more than 8 g/dL to avoid demand ischemia. 4. If hemoglobin continues to drop despite correction of INR, we will consider endoscopic evaluation. 5. Recommend ENT evaluation. 6. We will continue to monitor clinical course alongside primary team.    Thank you very much for allowing us to see your patient.     Electronically signed by Mary Grace Carlton MD on 9/15/2020 at 5:49 PM

## 2020-09-15 NOTE — ED NOTES
Pt updated with the decision for a blood transfusion for hgb 6.4.  Consent form signed by pt     Ingrid Ayoub RN  09/15/20 6044

## 2020-09-15 NOTE — H&P
[address]     Department of Internal Medicine - Staff Internal Medicine Teaching Service          ADMISSION NOTE/HISTORY AND PHYSICAL EXAMINATION   Date: 9/15/2020  Patient Name: Flakita Cruz  Date of admission: 9/15/2020  8:42 AM  YOB: 1940  PCP: Lele Fair MD  History Obtained From:  patient    CHIEF COMPLAINT     Chief complaint: Epistaxis and melena while on warfarin    HISTORY OF PRESENTING ILLNESS     The patient is a pleasant 67 y.o. female with past history of atrial fibrillation on warfarin, recent TAVR for severe aortic stenosis presented to ED with multiple episodes of epistaxis and melena with supratherapeutic INR at 5. Patient is a poor historian and is unable to give good history. She reports that for the couple of days she is having profuse epistaxis with Melena. She denied chest pain, abdominal pain, hematemesis or hematochezia. She has chronic Nevarez's catheter placed and chronic bilateral lower extremity swelling with some redness. In the ED, she was requiring 4 L of oxygen through nasal cannula and was breathing without any significant distress. Her blood pressure was elevated to 179/104 and was started on nitroglycerin drip by cardiology. She had acute on chronic anemia with hemoglobin of 6.4 requiring blood transfusion but her hemoglobin did not come up. Chest x-ray revealed bilateral pleural effusion unchanged from the previous one, along with pulmonary vascular congestion suggestive of fluid overload. She received 1 dose of Lasix 40 mg in the ED. When she was transferred to the floor, she started having significant respiratory distress and was requiring 6 L of oxygen through nasal cannula. She had wheezing all over her chest along with bilateral basilar crackles. She was given another dose of Lasix 40 mg along with breathing treatments leading to much improvement.   She was given another PRBC transfusion with a goal of keeping HYSTERECTOMY, VAGINAL  8/13/1974    JOINT REPLACEMENT Right     Knee    MASTECTOMY Left     pt unsure of year, she was in her 64's     SHOULDER ARTHROPLASTY Left 2016    SIGMOIDOSCOPY N/A 2/27/2020    SIGMOIDOSCOPY DIAGNOSTIC FLEXIBLE performed by John Paiz DO at [place] [id number]     Patient has no known allergies. MEDICATIONS PRIOR TO ADMISSION     Prior to Admission medications    Medication Sig Start Date End Date Taking? Authorizing Provider   cephALEXin (KEFLEX) 500 MG capsule Take 1 capsule by mouth 2 times daily 8/13/20   Clayton Rodríguez MD   celecoxib (CELEBREX) 200 MG capsule Take 200 mg by mouth 2 times daily    Historical Provider, MD   losartan (COZAAR) 50 MG tablet Take 50 mg by mouth    Historical Provider, MD   furosemide (LASIX) 80 MG tablet Take 80 mg by mouth 2 times daily    Historical Provider, MD   umeclidinium-vilanterol (ANORO ELLIPTA) 62.5-25 MCG/INH AEPB inhaler Inhale 1 puff into the lungs daily 6/3/20   Sterling Zaragoza MD   warfarin (COUMADIN) 1 MG tablet Take 1 mg by mouth daily monday    Historical Provider, MD   hydrochlorothiazide (MICROZIDE) 12.5 MG capsule Take 12.5 mg by mouth daily    Historical Provider, MD   metoprolol tartrate (LOPRESSOR) 25 MG tablet Take 0.5 tablets by mouth 2 times daily 10/15/19   MASON Sanz CNP   clopidogrel (PLAVIX) 75 MG tablet Take 1 tablet by mouth daily 10/16/19   MASON Sanz CNP   albuterol (ACCUNEB) 1.25 MG/3ML nebulizer solution Inhale 1 ampule into the lungs every 6 hours as needed for Wheezing    Historical Provider, MD   FLOVENT  MCG/ACT inhaler Inhale 1 puff into the lungs 2 times daily  7/28/15   Historical Provider, MD   PROVENTIL  (90 BASE) MCG/ACT inhaler Inhale 1 puff into the lungs 4 times daily  4/15/15   Historical Provider, MD   atorvastatin (LIPITOR) 10 MG tablet Take 10 mg by mouth daily.     Historical Provider, MD   warfarin (COUMADIN) 4 MG tablet Take 2 mg by mouth daily [id number]-Friday-sat-sun    Historical Provider, MD   montelukast (SINGULAIR) 10 MG tablet Take 10 mg by mouth nightly. Historical Provider, MD   Acetaminophen (TYLENOL PO) Take  by mouth as needed. Historical Provider, MD       SOCIAL HISTORY     Tobacco: Current  Alcohol: No  Illicits: No    FAMILY HISTORY     Family History   Problem Relation Age of Onset    Other Mother         COPD    Arthritis Mother     Arthritis Father     Cancer Sister         breast    Heart Disease Brother        PHYSICAL EXAM     Vitals: BP (!) 171/90   Pulse 80   Temp 98.5 °F (36.9 °C)   Resp 15   Wt 121 lb (54.9 kg)   LMP  (LMP Unknown)   SpO2 100%   BMI 24.44 kg/m²   Tmax: Temp (24hrs), Av.2 °F (36.8 °C), Min:97.9 °F (36.6 °C), Max:98.5 °F (36.9 °C)    Last Body weight:   Wt Readings from Last 3 Encounters:   09/15/20 121 lb (54.9 kg)   20 121 lb 7.6 oz (55.1 kg)   20 98 lb (44.5 kg)     Body Mass Index : Body mass index is 24.44 kg/m². PHYSICAL EXAMINATION:  Constitutional: This is a not well nourished, 25-29.9 - Overweight 67y.o. year old female who has difficulty hearing and is a poor historian. Head:normocephalic and atraumatic. EENT:  PERRLA. No conjunctival injections. Septum was midline, mucosa was without erythema, exudates or cobblestoning. No thrush was noted. Neck: Supple without thyromegaly. No elevated JVP. Trachea was midline. Shingles rash on left side of neck  Respiratory: Bilateral wheezing, along with basal crepitations. Cardiovascular: Regular without murmur, clicks, gallops or rubs. Abdomen: Slightly rounded and soft without organomegaly. No rebound, rigidity or guarding was appreciated. Lymphatic: No lymphadenopathy. Extremities: Bilateral chronic lower extremity edema, along with some redness and skin changes.   Neurological/Psychiatric: The patient's general behavior, level of consciousness, thought content and emotional status is 12.0 sec    INR 5.0 (HH)    Troponin    Collection Time: 09/15/20  9:10 AM   Result Value Ref Range    Troponin, High Sensitivity 86 (HH) 0 - 14 ng/L    Troponin T NOT REPORTED <0.03 ng/mL    Troponin Interp NOT REPORTED    TYPE AND SCREEN    Collection Time: 09/15/20  9:42 AM   Result Value Ref Range    Expiration Date 09/18/2020,2359     Arm Band Number [id number]     ABO/Rh O POSITIVE     Antibody Screen NEGATIVE     Unit Number [id number]     Product Code Leukocyte Reduced Red Cell     Unit Divison 00     Dispense Status ISSUED     Transfusion Status OK TO TRANSFUSE     Crossmatch Result COMPATIBLE    Troponin    Collection Time: 09/15/20 10:48 AM   Result Value Ref Range    Troponin, High Sensitivity 78 (HH) 0 - 14 ng/L    Troponin T NOT REPORTED <0.03 ng/mL    Troponin Interp NOT REPORTED    HEMOGLOBIN AND HEMATOCRIT, BLOOD    Collection Time: 09/15/20  1:24 PM   Result Value Ref Range    Hemoglobin 6.6 (LL) 11.9 - 15.1 g/dL    Hematocrit 22.1 (L) 36.3 - 47.1 %       Imaging:   Xr Chest (2 Vw)    Result Date: 9/15/2020  Cardiomegaly, pulmonary vascular congestion, and bibasilar airspace opacities. Moderate-sized bilateral pleural effusions. ASSESSMENT & PLAN       IMPRESSION  This is a 67 y.o. female with past history of afibrillation on warfarin, recent TAVR for severe aortic stenosis presented with 2-day history of epistaxis and melena while on warfarin and found to have supratherapeutic INR at 5 along with anemia with hemoglobin at 6.4. PLAN:    1. Acute on chronic anemia due to GI bleed-melena and epistaxis while on warfarin with admitting hemoglobin of 6.4 s/p 2 PRBC transfusion. Goal Hb >8 to avoid demand ischemia. H&H monitoring every 6 hours. Protonix 40 mg IV twice daily. GI consult for possible scope  2. Epistasis while on warfarin-resolved. Observe. ENT consult  3. Supratherapeutic INR on warfarin-hold warfarin all other AC/AP.   No impressive indication to reverse at this

## 2020-09-15 NOTE — ED PROVIDER NOTES
[address] HANDOFF       Handoff taken on the following patient from prior Attending Physician:  Pt Name: Es Fisher  PCP:  Yen Campos MD    Attestation  I was available and discussed any additional care issues that arose and coordinated the management plans with the resident(s) caring for the patient during my duty period. Any areas of disagreement with resident's documentation of care or procedures are noted on the chart. I was personally present for the key portions of any/all procedures during my duty period. I have documented in the chart those procedures where I was not present during the key portions. CHIEF COMPLAINT       Chief Complaint   Patient presents with    Shortness of Breath     SOB with BLE swelling. H/o COPD/CHF/A.fib. Denies chest pain, cough. Afebrile         CURRENT MEDICATIONS     Previous Medications  Previous Medications    ACETAMINOPHEN (TYLENOL PO)    Take  by mouth as needed. ALBUTEROL (ACCUNEB) 1.25 MG/3ML NEBULIZER SOLUTION    Inhale 1 ampule into the lungs every 6 hours as needed for Wheezing    ATORVASTATIN (LIPITOR) 10 MG TABLET    Take 10 mg by mouth daily. CELECOXIB (CELEBREX) 200 MG CAPSULE    Take 200 mg by mouth 2 times daily    CEPHALEXIN (KEFLEX) 500 MG CAPSULE    Take 1 capsule by mouth 2 times daily    CLOPIDOGREL (PLAVIX) 75 MG TABLET    Take 1 tablet by mouth daily    FLOVENT  MCG/ACT INHALER    Inhale 1 puff into the lungs 2 times daily     FUROSEMIDE (LASIX) 80 MG TABLET    Take 80 mg by mouth 2 times daily    HYDROCHLOROTHIAZIDE (MICROZIDE) 12.5 MG CAPSULE    Take 12.5 mg by mouth daily    LOSARTAN (COZAAR) 50 MG TABLET    Take 50 mg by mouth    METOPROLOL TARTRATE (LOPRESSOR) 25 MG TABLET    Take 0.5 tablets by mouth 2 times daily    MONTELUKAST (SINGULAIR) 10 MG TABLET    Take 10 mg by mouth nightly.     PROVENTIL  (90 BASE) MCG/ACT INHALER    Inhale 1 puff into the lungs 4 times daily UMECLIDINIUM-VILANTEROL (ANORO ELLIPTA) 62.5-25 MCG/INH AEPB INHALER    Inhale 1 puff into the lungs daily    WARFARIN (COUMADIN) 1 MG TABLET    Take 1 mg by mouth daily monday    WARFARIN (COUMADIN) 4 MG TABLET    Take 2 mg by mouth daily [id number]-Friday-sat-sun       Encounter Medications  Orders Placed This Encounter   Medications    furosemide (LASIX) injection 40 mg    AND Linked Order Group     pantoprazole (PROTONIX) injection 40 mg     sodium chloride (PF) 0.9 % injection 10 mL    furosemide (LASIX) injection 40 mg    DISCONTD: furosemide (LASIX) injection 20 mg    nitroGLYCERIN 50 mg in dextrose 5% 250 mL infusion       ALLERGIES     has No Known Allergies. RECENT VITALS:   Temp: 98.5 °F (36.9 °C),  Pulse: 80, Resp: 15, BP: (!) 171/90    RADIOLOGY:   XR CHEST (2 VW)   Final Result   Cardiomegaly, pulmonary vascular congestion, and bibasilar airspace   opacities. Moderate-sized bilateral pleural effusions.              LABS:  Labs Reviewed   BASIC METABOLIC PANEL - Abnormal; Notable for the following components:       Result Value    BUN 43 (*)     CREATININE 1.68 (*)     Calcium 7.9 (*)     Anion Gap 8 (*)     GFR Non- 29 (*)     GFR  36 (*)     All other components within normal limits   BRAIN NATRIURETIC PEPTIDE - Abnormal; Notable for the following components:    Pro-BNP 13,461 (*)     All other components within normal limits   CBC WITH AUTO DIFFERENTIAL - Abnormal; Notable for the following components:    WBC 3.3 (*)     RBC 2.19 (*)     Hemoglobin 6.4 (*)     Hematocrit 21.9 (*)     RDW 18.4 (*)     Seg Neutrophils 66 (*)     Lymphocytes 21 (*)     Immature Granulocytes 2 (*)     Absolute Lymph # 0.71 (*)     All other components within normal limits   PROTIME-INR - Abnormal; Notable for the following components:    Protime 49.1 (*)     INR 5.0 (*)     All other components within normal limits   TROPONIN - Abnormal; Notable for the following components:    Troponin, High Sensitivity 86 (*)     All other components within normal limits   TROPONIN - Abnormal; Notable for the following components:    Troponin, High Sensitivity 78 (*)     All other components within normal limits   HEMOGLOBIN AND HEMATOCRIT, BLOOD - Abnormal; Notable for the following components:    Hemoglobin 6.6 (*)     Hematocrit 22.1 (*)     All other components within normal limits   TYPE AND SCREEN   PREPARE RBC (CROSSMATCH)           PLAN/ TASKS OUTSTANDING     This patient is a 67 y.o. Female. This is an admitted and boarding patient, 61-year-old with shortness of breath, found to have melena and anemia to 6.4. Concern for GI bleed and CHF. Given blood x1, Protonix. This patient is under the care of the admitting service and I am available for any acute changes in the patient condition.       (Please note that portions of this note were completed with a voice recognition program.  Efforts were made to edit the dictations but occasionally words are mis-transcribed.)    Wang MD  Attending Emergency Physician       Keily Mars MD  09/15/20 0980

## 2020-09-15 NOTE — CONSULTS
Attestation signed by      Attending Physician Statement:    I have discussed the care of  Zuleima Prieto , including pertinent history and exam findings, with the Cardiology fellow/resident. I have seen and examined the patient and the key elements of all parts of the encounter have been performed by me. I agree with the assessment, plan and orders as documented by the fellow/resident, after I modified exam findings and plan of treatments, and the final version is my approved version of the assessment. Additional Comments:     Patient with single vessel CAD s/p PCI-LAD, PAF and severe aortic stenosis post recent TAVR in August 2020 admitted with worsening dyspnea and melanotic stools, she is very poor historian, denies any chest pain, Hb on admission 6.4, INR 5.0,  bilateral groins soft and without hematoma, no abdominal tenderness. Admit to CVICU. Transfuse PRBC x 2 units. Start Lasix IV 40 mg bid. Monitor serial Hb. Recheck INR. Hold coumadin and plavix. GI consult. Nitro drip for BP control. Resume BB. [facility]    Consult / H&P               Today's Date: 9/15/2020  Patient Name: Zuleima Prieto  Date of admission: 9/15/2020  8:42 AM  Patient's age: 67 y. o., 1940  Admission Dx: Anemia [D64.9]    Reason for Consult:  Cardiac evaluation    Requesting Physician: No admitting provider for patient encounter. CHIEF COMPLAINT: Shortness of breath, bilateral LE swelling    History Obtained From:  patient, electronic medical record    HISTORY OF PRESENT ILLNESS:      The patient is a 67 y.o.  female who is admitted to the hospital for LE swelling. Patient reports worsening of chronic bilateral lower extremity edema in the past few weeks. This was accompanied with mild shortness of breath worsened on exertion.   Patient has extensive cardiac history including recent TAVR 8/11/2020 and significant CAD status post stent 10/14/2019 in mid LAD, atrial fibrillation converted to NSR, CHF class II. Patient reports she has been taking medications as prescribed including diuretics. Patient is a poor historian, but reports she has noted significant epistaxis. 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 In the ED, noted to have melanic stool per staff. Hgb found to be 6.4. Blood pressure significantly elevated to  189/73. Past Medical History:   has a past medical history of Acute on chronic respiratory failure ([address] TIFFANIE (acute kidney injury) ([address] Anemia, Anticoagulated, Aortic valve disease, Asthma, Atonic bladder, Atrial fibrillation (HCC), Backache, unspecified, Blood in stool, Breast cancer, left ([address] Cachexia ([address] Chronic venous insufficiency, CKD (chronic kidney disease), COPD (chronic obstructive pulmonary disease) ([address] Coronary atherosclerosis of unspecified type of vessel, native or graft, Current smoker, Dry skin, Edema, Nevarez catheter present, Full dentures, History of blood transfusion, Hyperlipidemia, Hypertension, Impaired mobility, Kyphosis, Loss of weight, MI (myocardial infarction) ([address] Osteoarthritis, Poor appetite, PVD (peripheral vascular disease) ([address] Superficial thrombosis of leg, Unspecified sinusitis (chronic), Unspecified urinary incontinence, Unspecified vitamin D deficiency, and Wears glasses. Past Surgical History:   has a past surgical history that includes Hysterectomy, vaginal (8/13/1974); Appendectomy; Mastectomy (Left); joint replacement (Right); eye surgery (Bilateral, 12/2015); Total shoulder arthroplasty (Left, 2016); Colonoscopy; Cardiac catheterization (10/14/2019); and sigmoidoscopy (N/A, 2/27/2020). Home Medications:    Prior to Admission medications    Medication Sig Start Date End Date Taking?  Authorizing Provider   cephALEXin (KEFLEX) 500 MG capsule Take 1 capsule by mouth 2 times daily 8/13/20   Liya Lama MD   celecoxib (CELEBREX) 200 MG capsule Take 200 mg by mouth 2 times daily    Historical Provider, MD   losartan (COZAAR) 50 MG tablet Take 50 mg by mouth    Historical Provider, MD   furosemide (LASIX) 80 MG tablet Take 80 mg by mouth 2 times daily    Historical Provider, MD   umeclidinium-vilanterol (ANORO ELLIPTA) 62.5-25 MCG/INH AEPB inhaler Inhale 1 puff into the lungs daily 6/3/20   Natalie Chandra MD   warfarin (COUMADIN) 1 MG tablet Take 1 mg by mouth daily monday    Historical Provider, MD   hydrochlorothiazide (MICROZIDE) 12.5 MG capsule Take 12.5 mg by mouth daily    Historical Provider, MD   metoprolol tartrate (LOPRESSOR) 25 MG tablet Take 0.5 tablets by mouth 2 times daily 10/15/19   MASON Wheat CNP   clopidogrel (PLAVIX) 75 MG tablet Take 1 tablet by mouth daily 10/16/19   MASON Wheat CNP   albuterol (ACCUNEB) 1.25 MG/3ML nebulizer solution Inhale 1 ampule into the lungs every 6 hours as needed for Wheezing    Historical Provider, MD   FLOVENT  MCG/ACT inhaler Inhale 1 puff into the lungs 2 times daily  7/28/15   Historical Provider, MD   PROVENTIL  (90 BASE) MCG/ACT inhaler Inhale 1 puff into the lungs 4 times daily  4/15/15   Historical Provider, MD   atorvastatin (LIPITOR) 10 MG tablet Take 10 mg by mouth daily. Historical Provider, MD   warfarin (COUMADIN) 4 MG tablet Take 2 mg by mouth daily [id number]-Friday-sat-sun    Historical Provider, MD   montelukast (SINGULAIR) 10 MG tablet Take 10 mg by mouth nightly. Historical Provider, MD   Acetaminophen (TYLENOL PO) Take  by mouth as needed. Historical Provider, MD      Current Facility-Administered Medications: furosemide (LASIX) injection 40 mg, 40 mg, Intravenous, BID  nitroGLYCERIN 50 mg in dextrose 5% 250 mL infusion, 5 mcg/min, Intravenous, Continuous    Allergies:  Patient has no known allergies. Social History:   reports that she has been smoking cigarettes. She has a 47.00 pack-year smoking history.  She has displaced  · Mild systolic murmur, regular S1 and S2.  · Jugular venous pulsation Normal  · The carotid upstroke is normal in amplitude and contour without delay or bruit  · Peripheral pulses are symmetrical and full   Abdomen:   · Significant tenderness in lower abdomen and suprapubic area  · Bowel sounds present  Extremities:  ·  No Cyanosis or Clubbing  ·  Lower extremity edema: No  ·  Skin: Warm and dry  Neurological:  · Alert and oriented. · Moves all extremities well  · No abnormalities of mood, affect, memory, mentation, or behavior are noted    DATA:    Diagnostics:    EKG:  Sinus rhythm with Premature ventricular complexes or Fusion complexes  Left ventricular hypertrophy with QRS widening and repolarization abnormality  Abnormal ECG  When compared with ECG of 11-AUG-2020 10:28,  Sinus rhythm has replaced Atrial fibrillation  Left bundle branch block is no longer Present  ECHO:   Post TAVR echocardiogram  Good TAVR position with no significant abnormalities and improved mean  gradient to minimal,  Normal LV systolic function  Mild mitral regurgitation (Improved from before)  Mild tricuspid regurgitation  Increased PAP to 45 mmHg  Stress Test: not obtained. Cardiac Angiography:  CATH 10/14/19: Single vessel significant CAD, mid LAD 90%, required PTCA/ANA. Minimal disease in RCA and LCx. EF 50%. Severe AS with mean gradient 34 mmHg and valve area 0.7 cm2. Abdominal aorta gram and iliac arteries, suggest no serious stenosis. Labs:     CBC:   Recent Labs     09/15/20  0910   WBC 3.3*   HGB 6.4*   HCT 21.9*        BMP:   Recent Labs     09/15/20  0910      K 4.6   CO2 31   BUN 43*   CREATININE 1.68*   LABGLOM 29*   GLUCOSE 83     BNP: No results for input(s): BNP in the last 72 hours. PT/INR:   Recent Labs     09/15/20  0910   PROTIME 49.1*   INR 5.0*     APTT:No results for input(s): APTT in the last 72 hours.   CARDIAC ENZYMES:No results for input(s): CKTOTAL, CKMB, CKMBINDEX, TROPONINI in the last 72 hours. FASTING LIPID PANEL:No results found for: HDL, LDLDIRECT, LDLCALC, TRIG  LIVER PROFILE:No results for input(s): AST, ALT, LABALBU in the last 72 hours. IMPRESSION:    Patient Active Problem List   Diagnosis    Peripheral vascular disease ([facility]    Dermatophytosis of nail    Corns and callosities    Peripheral venous insufficiency    HT (hammer toe)    Carotid artery stenosis    Swelling of left lower extremity    Cellulitis of left lower limb    History of angioplasty    Aortic stenosis, severe    COPD (chronic obstructive pulmonary disease) ([facility]    Presence of stent in coronary artery in patient with coronary artery disease    Smoker unmotivated to quit    A-fib ([facility]    Anticoagulated    Urinary retention    Atonic bladder    Hypertension    Thrombocytopenia (HCC)    Stage 3 chronic kidney disease ([facility]    Kyphosis    Coronary artery disease involving native coronary artery without angina pectoris    Anemia     GI bleed. Anemia hgb 6.4, receiving 2U PRBC  CKD, not previously followed with nephro   Pulmonary congestion and bilateral effusions. AS s/p TAVR  CAD s/p PCI to LAD       RECOMMENDATIONS:  1. We will start nitroglycerin drip for blood pressure, target 160  2. Lasix 40 IV twice daily  3. PRBC  4. Recommend GI consult for evaluation of GI bleed resulting in blood loss anemia  5. Recommend nephrology consult for new onset CKD, requiring diuresis      Discussed with patient and Nurse.     Electronically signed by Kinsey Sharma MD on 9/15/2020 at 10:56 AM    [facility]      [phone]

## 2020-09-15 NOTE — ED NOTES
Writer received phone call from patient's son Freedom Ochoa [phone] who stated his sister is patient's JESSIAC Hinton [phone]. Bonnie Corbin stated he/family want patient to go to [facility] in [place] [phone] upon patient's discharge. Bonnie Corbin stated he will  patient & transport her to [facility]. Bonnie Corbin stated they are unable to assist patient in the home. Writer relayed Bonnie Corbin' request to . Bonnie Corbin requested updates on patient's care.       JUNIE Fleming  09/15/20 1147

## 2020-09-16 PROBLEM — D64.89 OTHER SPECIFIED ANEMIAS: Status: ACTIVE | Noted: 2020-01-01

## 2020-09-16 NOTE — CARE COORDINATION
Received call from Paul at [facility], they can accept, will need a covid swab prior to discharge, notified Dr. Srinivas Velasquez via PS

## 2020-09-16 NOTE — CONSULTS
[facility] [address]                                  CONSULTATION    PATIENT NAME: Heather Yepez                      :        1940  MED REC NO:   [id number]                             ROOM:       1004  ACCOUNT NO:   [de-identified]                           ADMIT DATE: 09/15/2020  PROVIDER:     Darcy Lew    CONSULT DATE:  2020    LOCATION:  1004, bed-1. HISTORY OF PRESENT ILLNESS:  This is a 60-year-old who presented one day  prior to this dictation with worsening bilateral lower extremity edema,  melena, and epistaxis history. The reviewers, refer to the chart for definitive details. The patient reports worsening of chronic bilateral lower extremity edema  over the past few weeks. She describes associated shortness of breath,  worse with exertion. Cardiac history is significant for aortic valve  replacement, coronary artery disease requiring stent placement, atrial  fibrillation requiring conversion, and CHF. Cardiac medications include  Coumadin, Plavix. Medical history is also significant for GI bleed. The patient is status  post EGD, colonoscopy earlier this year. Child's esophagus was  apparently identified. The patient now describes recent-onset epistaxis, left worse than right. She denies previous similar history. She admits to a 3- to 4-day  history of disease which began just prior to admission. She denies,  however, further nasal bleeding post admission. She does require  chronic nasal cannula oxygen for COPD. Medical history is also  significant for hypertension. Cardiology was consulted. Gastroenterology was consulted. Hemoglobin measured 6.4 on admission, platelets were within normal  limits. PT measured 49.1. INR was 5.0. The patient was transfused. PAST MEDICAL HISTORY:  As above.   Chronic Nevarez catheter, dysphagia,  acute-on-chronic respiratory failure, acute kidney injury, atonic  bladder, left breast cancer, chronic venous insufficiency, chronic  kidney disease, hyperlipidemia, kyphosis, osteoarthritis, peripheral  vascular disease, superficial lower extremity thrombosis, chronic  sinusitis, vitamin D deficiency. PAST SURGICAL HISTORY:  Appendectomy, cardiac catheterization,  colonoscopy, bilateral cataracts, hysterectomy, right knee replacement,  left mastectomy, left shoulder arthroscopy, sigmoidoscopy. ALLERGIES:  None. SOCIAL HISTORY:  Tobacco abuse. Denies alcohol or illicit drug use. VITAL SIGNS:  Afebrile, maximum temperature 99.2; heart rate 67;  respiratory rate 17; blood pressure 134/63; O2 saturation 97%, face  mask. PT 37.2, INR 3.7. WBC 3.3, hemoglobin 8.4, platelets 746. BUN 39,  creatinine 1.5.    Two-view chest x-ray:  Cardiomegaly, pulmonary vascular congestion,  bibasilar airspace opacities, moderate-size bilateral pleural effusions. PRESENT MEDICATIONS:  Include, but are not limited to, DuoNeb,  nitroglycerin, Lipitor, Flovent, Protonix, Lopressor, Lasix,  Solu-Medrol, Mucinex. PHYSICAL EXAMINATION:  Bedside exam revealed a pleasant 67year-old. She was in no acute distress. She was markedly hard of hearing. Her  voice was strong and intact and grossly unremarkable. There was no  respiratory distress or stridor. Facial exam was unremarkable. Bilateral ear exam revealed moderate tympanosclerosis but without active  disease. Oral exam revealed no postnasal bleeding. No old blood was  noted. Oral mucosa was mildly dry. Palpation of the neck was  unremarkable. Anterior rhinoscopy revealed pale mucosal edema. There  was minimal old blood in the left nasal airway only. There was no  evidence of nasal mass, polyp, or mucopurulence. There was no  significant clot or active bleeding. IMPRESSION:  New recent-onset, left worse than right, epistaxis. The patient denies further bleeding post admission.     Risk factors include chronic nasal cannula oxygen therapy, hypertension,  coagulopathy secondary to Coumadin and Plavix, supratherapeutic INR. No immediate intervention, including nasal packing, directed cautery,  required. We will begin nasal saline as 2 sprays each nostril three  times daily, bacitracin ointment intranasally three times daily. Recommend converting nasal cannula to a highly humidified face mask  (ordered). The patient appeared to understand these plans and considerations. All  questions were answered. We are available to reassess the patient at  any time and for any reason. Please do not hesitate to contact myself  with any specific questions regarding the above.         Apoorva Murray    D: 09/16/2020 10:17:45       T: 09/16/2020 10:27:11     [email or web address]  Job#: [id number]     Doc#: [id number]    CC:

## 2020-09-16 NOTE — PROGRESS NOTES
Patient seen, examined  Chart reviewed  Consult dictated    A/P: Left worse than right epistaxis history            - Describes 3-4 day history prior to admission. Denies preceding similar history                    no further bleeding post-admission            - Risk factors include chronic nasal cannula oxygen therapy, hypertension, coagulopathy secondary to Coumadin and Plavix, supratherapeutic INR, potential CKI related platelet dysfunction            - No immediate intervention - nasal packing, directed cautery required            - Will begin nasal saline, Bacitracin ointment            - Recommend converting nasal cannula to a highly humidified facemask (ordered)            - Discussed with Mrs. Fiona Espinosa, all questions answered            - Available to reassess Mrs. Fiona Espinosa at any time.   Please call with any questions

## 2020-09-16 NOTE — PLAN OF CARE
Excell Flatten, RCPPatient Assessment complete. Anemia [D64.9] . Vitals:    09/15/20 2148   BP: (!) 144/65   Pulse:    Resp:    Temp:    SpO2:    . Patients home meds are   Prior to Admission medications    Medication Sig Start Date End Date Taking? Authorizing Provider   celecoxib (CELEBREX) 200 MG capsule Take 200 mg by mouth 2 times daily   Yes Historical Provider, MD   losartan (COZAAR) 50 MG tablet Take 50 mg by mouth   Yes Historical Provider, MD   furosemide (LASIX) 80 MG tablet Take 80 mg by mouth 2 times daily   Yes Historical Provider, MD   umeclidinium-vilanterol (ANORO ELLIPTA) 62.5-25 MCG/INH AEPB inhaler Inhale 1 puff into the lungs daily 6/3/20  Yes Jeannie Thompson MD   warfarin (COUMADIN) 1 MG tablet Take 1 mg by mouth daily monday   Yes Historical Provider, MD   hydrochlorothiazide (MICROZIDE) 12.5 MG capsule Take 12.5 mg by mouth daily   Yes Historical Provider, MD   metoprolol tartrate (LOPRESSOR) 25 MG tablet Take 0.5 tablets by mouth 2 times daily 10/15/19  Yes MASON Dimas CNP   clopidogrel (PLAVIX) 75 MG tablet Take 1 tablet by mouth daily 10/16/19  Yes MASON Dimas CNP   albuterol (ACCUNEB) 1.25 MG/3ML nebulizer solution Inhale 1 ampule into the lungs every 6 hours as needed for Wheezing   Yes Historical Provider, MD   FLOVENT  MCG/ACT inhaler Inhale 1 puff into the lungs 2 times daily  7/28/15  Yes Historical Provider, MD   PROVENTIL  (90 BASE) MCG/ACT inhaler Inhale 1 puff into the lungs 4 times daily  4/15/15  Yes Historical Provider, MD   atorvastatin (LIPITOR) 10 MG tablet Take 10 mg by mouth daily. Yes Historical Provider, MD   warfarin (COUMADIN) 4 MG tablet Take 2 mg by mouth daily [id number]-[id number]-Friday-sat-sun   Yes Historical Provider, MD   montelukast (SINGULAIR) 10 MG tablet Take 10 mg by mouth nightly. Yes Historical Provider, MD   Acetaminophen (TYLENOL PO) Take  by mouth as needed.    Yes Historical Provider, MD   cephALEXin (KEFLEX) 500 MG capsule Take 1 capsule by mouth 2 times daily 8/13/20   Israel Brice MD       RR 23  Breath Sounds: Ins/exp wheeze, dim      · Bronchodilator assessment at level  4  · Hyperinflation assessment at level   · Secretion Management assessment at level    ·   · [x]    Bronchodilator Assessment  BRONCHODILATOR ASSESSMENT SCORE  Score 0 1 2 3 4 5   Breath Sounds   []  Patient Baseline []  No Wheeze good aeration []  Faint, scattered wheezing, good aeration []  Expiratory Wheezing and or moderately diminished [x]  Insp/Exp wheeze and/or very diminished []  Insp/Exp and/ or marked distress   Respiratory Rate   []  Patient Baseline []  Less than 20 []  Less than 20 [x]  20-25 []  Greater than 25 []  Greater than 25   Peak flow % of Pred or PB [x]  NA   []  Greater than 90%  []  81-90% []  71-80% []  Less than or equal to 70%  or unable to perform []  Unable due to Respiratory Distress   Dyspnea re []  Patient Baseline []  No SOB []  No SOB [x]  SOB on exertion []  SOB min activity []  At rest/acute   e FEV% Predicted       [x]  NA []  Above 69%  []  Unable []  Above 60-69%  []  Unable []  Above 50-59%  []  Unable []  Above 35-49%  []  Unable []  Less than 35%  []  Unable                 []  Hyperinflation Assessment  Score 1 2 3   CXR and Breath Sounds   []  Clear []  No atelectasis  Basilar aeration []  Atelectasis or absent basilar breath sounds   Incentive Spirometry Volume  (Per IBW)   []  Greater than or equal to 15ml/Kg []  less than 15ml/Kg []  less than 15ml/Kg   Surgery within last 2 weeks []  None or general   []  Abdominal or thoracic surgery  []  Abdominal or thoracic   Chronic Pulmonary Historyre []  No []  Yes []  Yes     []  Secretion Management Assessment  Score 1 2 3   Bilateral Breath Sounds   []  Occasional Rhonchi []  Scattered Rhonchi []  Course Rhonchi and/or poor aeration   Sputum    []  Small amount of thin secretions []  Moderate amount of viscous secretions []  Copius, Viscious [phone]

## 2020-09-16 NOTE — PROGRESS NOTES
[facility] [place] [facility] [place] Progress Note    Zabrina Roberts is a 67 y.o. female patient. Hospitalization Day:1      Chief consult reason: Melena, acute blood loss anemia      Subjective: Patient seen and examined. Patient denies any further epistaxis. Staff reports patient had stool last night with a streak of bright red but without evidence of melena. Patient was seen by ENT who recommended humidified oxygen. VITALS:  /63   Pulse 67   Temp 98.5 °F (36.9 °C) (Oral)   Resp 17   Ht 5' (1.524 m)   Wt 136 lb 14.5 oz (62.1 kg)   LMP  (LMP Unknown)   SpO2 97%   BMI 26.74 kg/m²   TEMPERATURE:  Current - Temp: 98.5 °F (36.9 °C); Max - Temp  Av.4 °F (36.9 °C)  Min: 97.2 °F (36.2 °C)  Max: 99.2 °F (37.3 °C)    Physical Assessment:  General appearance:  alert, cooperative and no distress  Mental Status:  oriented to person, place and time and normal affect  Lungs:  Decreased aeration bilaterally, normal effort, on supplemental oxygen per facemask  Heart:  regular rate and rhythm, no murmur  Abdomen:  soft, nontender, nondistended, + bowel sounds  Extremities:  Trace LE  edema, no redness, No clubbing  Skin:  warm, dry, no gross lesions or rashes    Data Review:  LABS and IMAGING:     CBC  Recent Labs     09/15/20  0910 09/15/20  1324 09/15/20  1716 20  0046 20  0451   WBC 3.3*  --   --   --  3.3*   HGB 6.4* 6.6* 6.1* 7.2* 8.4*   HCT 21.9* 22.1* 20.5* 23.8* 27.8*   .0  --   --   --  96.2   MCHC 29.2  --   --   --  30.2   RDW 18.4*  --   --   --  17.7*     --   --   --  113*       Immature PLTs   No results found for: PLTFLUORE    ANEMIA STUDIES  No results for input(s): LABIRON, TIBC, IRON, FERRITIN, QRNPOCQR07, FOLATE, OCCULTBLD in the last 72 hours.     BMP  Recent Labs     09/15/20  0910 20  0451    143   K 4.6 4.2    104   CO2 31 26   BUN 43* 39*   CREATININE 1.68* 1.50*   GLUCOSE 83 93   CALCIUM 7.9* 8.0*       LFTS  No results for input(s): ALKPHOS, ALT, AST, BILITOT, BILIDIR, LABALBU in the last 72 hours. AMYLASE/LIPASE/AMMONIA  No results for input(s): AMYLASE, LIPASE, AMMONIA in the last 72 hours. Acute Hepatitis Panel   No results found for: HEPBSAG, HEPCAB, HEPBIGM, HEPAIGM    HCV Genotype   No results found for: HEPATITISCGENOTYPE    HCV Quantitative   No results found for: HCVQNT    LIVER WORK UP:    AFP  No results found for: AFP    Alpha 1 antitrypsin   No results found for: A1A    Anti - Liver/Kidney Ab  No results found for: LIVER-KIDNEYMICROSOMALAB    JONES  No results found for: JONES    AMA  No results found for: MITOAB    ASMA  No results found for: SMOOTHMUSCAB    Ceruloplasmin  No results found for: CERULOPLSM    Celiac panel  No results found for: TISSTRNTIIGG, TTGIGA, IGA    IgG  No results found for: IGG    IgM  No results found for: IGM    GGT   No results found for: LABGGT    PT/INR  Recent Labs     09/15/20  0910 09/16/20  0451   PROTIME 49.1* 37.2*   INR 5.0* 3.7       Cancer Markers:  CEA:  No results for input(s): CEA in the last 72 hours. Ca 125:  No results for input(s):  in the last 72 hours. Ca 19-9:   No results for input(s):  in the last 72 hours. AFP: No results for input(s): AFP in the last 72 hours. Lactic acid:No results for input(s): LACTACIDWB in the last 72 hours. Radiology Review:    Xr Chest (2 Vw)    Result Date: 9/15/2020  EXAMINATION: TWO XRAY VIEWS OF THE CHEST 9/15/2020 9:37 am COMPARISON: 08/13/2020 HISTORY: ORDERING SYSTEM PROVIDED HISTORY: rhonchi TECHNOLOGIST PROVIDED HISTORY: rhonchi Reason for Exam: rhonchi Acuity: Unknown Type of Exam: Unknown FINDINGS: Examination is limited by low lung volumes and exaggerated kyphosis. Cardiomegaly and moderate-to-large bilateral pleural effusions. Adjacent bibasilar airspace opacities noted. Pulmonary vascular congestion. Osseous structures and soft tissues are grossly intact. Status post left shoulder arthroplasty.      Cardiomegaly, pulmonary vascular congestion, and bibasilar airspace opacities. Moderate-sized bilateral pleural effusions. ENDOSCOPY     Active Problems:    Anemia  Resolved Problems:    * No resolved hospital problems. *       GI Assessment:    1. Acute blood loss anemia likely secondary to epistaxis in setting of iatrogenic coagulopathy (coumadin) with INR of 5.0.  2. Melena secondary to ingested blood from above     -Unlikely GI source of bleeding    Patient's had no further episodes of melena. Hemoglobin improved to 8.4 g/dL  -Seen by ENT who recommends humidified oxygen    **(Patient reports she follows with Dr. Rob Jackson - GI in [facility])     Plan of care:  1. No indication for EGD at this time  2. Okay to change PPI to once daily  3. Please obtain a swallow evaluation if dysphagia is suspected. 4. Monitor hemoglobin and hematocrit. Recommend keeping hemoglobin greater than 8 g/dL to reduce demand ischemia. 5. Please call with any questions    Please feel free to contact me with any questions or concerns. Thank you for allowing me to participate in the care of your patient. MASON Paulino CNP on 9/16/2020 at 7:54 AM   [facility]    Please note that this note was generated using a voice recognition dictation software. Although every effort was made to ensure the accuracy of this automated transcription, some errors in transcription may have occurred. Attending Physician Attestation  Patient seen and examined with Ms. Henny Gilliam CNP. I have reviewed the above note and confirmed the key elements of the medical history and physical exam. I have discussed the findings, established the care plan and recommendations with Ms. Sandrita Hudson and primary team.    Guille Calderon MD  Gastroenterology

## 2020-09-16 NOTE — CARE COORDINATION
Attempt made to meet with patient for initial interview, patient asleep with O2 mask on. Upon review of chart notes, I see a note in from [facility] in ED that states POA is daughter Kiara Joseph and they want referral to [facility] [employer].   I left  for Olivernéstor Joseph, await return call

## 2020-09-16 NOTE — PROGRESS NOTES
Pharmacy Note  Warfarin Consult follow-up    Recent Labs     09/16/20  0451   INR 3.7     Recent Labs     09/15/20  0910  09/15/20  1716 09/16/20  0046 09/16/20  0451   HGB 6.4*   < > 6.1* 7.2* 8.4*   HCT 21.9*   < > 20.5* 23.8* 27.8*     --   --   --  113*    < > = values in this interval not displayed. Current warfarin drug-drug interactions:  acetaminophen, methylprednisolone    Date INR Dose   9/15/2020 5.0 none   9/16/2020 3.7 HOLD     Notes:   HOLD warfarin today 9/16/2020. Daily PT/INR while inpatient.       Triston Young, JOSSELYN, CACP  Clinical Pharmacist Medication Management  9/16/2020  7:49 AM

## 2020-09-16 NOTE — CARE COORDINATION
Case Management Initial Discharge Plan  Janet Schneider,             Met with:family member daughter Cherylene Alt over the phone to discuss discharge plans. Information verified: address, contacts, phone number, , insurance Yes    Emergency Contact/Next of Kin name & number: Keysha DUNN [facility], daughter) [phone], Samanta Jackson (son) [phone]    PCP: Josh Nielsen MD  Date of last visit:     Insurance Provider: [employer]     Discharge Planning    Living Arrangements:  Children(son Thony Strong)   Support Systems:  [id number] has 2 stories   stairs to climb to get into front door, stairs to climb to reach second floor  Location of bedroom/bathroom in home main    Patient able to perform ADL's:Assisted    Current Services (outpatient & in home) none  DME equipment: walker  DME provider:     Receiving oral anticoagulation therapy? Yes, Coumadin    If indicated:   Physician managing anticoagulation treatment:   Where does patient obtain lab work for ATC treatment? Potential Assistance Needed:  Kevin Baker    Patient agreeable to home care: No  [place] of choice provided:  no    Prior SNF/Rehab Placement and Facility: unsure  Agreeable to SNF/Rehab: Yes  [place] of choice provided: yes     Evaluation: no    Expected Discharge date:       Patient expects to be discharged to: Follow Up Appointment: Best Day/ Time:      Transportation provider: needs  Transportation arrangements needed for discharge: Yes    Readmission Risk              Risk of Unplanned Readmission:        18             Does patient have a readmission risk score greater than 14?: Yes  If yes, follow-up appointment must be made within 7 days of discharge. Goals of Care:       Discharge Plan: spoke with daughter Cherylene Alt over the phone, as patient asleep with O2 mask on.   Cherylene Alt relates that patient lives with son Thony Strong who is unable to care for her, they'd like referral to [facility].   Referral faxed, will need pre-auth from [address] [place] [address], will fax when PT/OT notes are available    [phone] discussed referral with [facility], she will review      Electronically signed by Sami Beltran RN on 9/16/20 at 12:59 PM EDT

## 2020-09-16 NOTE — PROGRESS NOTES
[facility]   Progress Note                    Date:   9/16/2020  Patient name:  Raoul Mcdowell  Date of admission:  9/15/2020  8:42 AM  MRN:   [id number]  YOB: 1940  PCP:    Renea Haynes MD    Reason for Admission:  Anemia [D64.9]    Subjective:      Clinical Changes / Abnormalities:    Patient seen and examined at bedside. No acute events overnight. No further episodes of epistaxis. RN did report a very small amount of bright red blood noted in stool since transfer to CV-ICU, however no melena noted. Reports no chest pain or shortness of breath. Urine output in the last 24 hours:     Intake/Output Summary (Last 24 hours) at 9/16/2020 0736  Last data filed at 9/16/2020 0654  Gross per 24 hour   Intake 881.58 ml   Output 1875 ml   Net -993.42 ml     I/O since admission: ~-1.0 liters      Medications:   Scheduled Meds:   ipratropium-albuterol  1 ampule Inhalation Q4H WA    atorvastatin  10 mg Oral Nightly    fluticasone  1 puff Inhalation BID    sodium chloride flush  10 mL Intravenous 2 times per day    pantoprazole  40 mg Intravenous BID    And    sodium chloride (PF)  10 mL Intravenous Daily    metoprolol tartrate  25 mg Oral BID    furosemide  40 mg Intravenous BID    methylPREDNISolone  40 mg Intravenous Daily    guaiFENesin  600 mg Oral BID    warfarin (COUMADIN) daily dosing (placeholder)   Other RX Placeholder     Continuous Infusions:   nitroGLYCERIN 80 mcg/min (09/16/20 0054)     CBC:   Recent Labs     09/15/20  0910  09/15/20  1716 09/16/20  0046 09/16/20  0451   WBC 3.3*  --   --   --  3.3*   HGB 6.4*   < > 6.1* 7.2* 8.4*     --   --   --  113*    < > = values in this interval not displayed.      BMP:    Recent Labs     09/15/20  0910 09/16/20  0451    143   K 4.6 4.2    104   CO2 31 26   BUN 43* 39*   CREATININE 1.68* 1.50*   GLUCOSE 83 93     Hepatic: No results for input(s): AST, ALT, ALB, BILITOT, ALKPHOS in the last 72 hours.  Troponin: No results for input(s): TROPONINI in the last 72 hours. Recent Labs     09/15/20  1048 09/16/20  0046 09/16/20  0451   TROPONINT NOT REPORTED NOT REPORTED NOT REPORTED     BNP:   Recent Labs     09/15/20  0910   PROBNP 13,461*      No results for input(s): BNP in the last 72 hours. Lipids: No results for input(s): CHOL, HDL in the last 72 hours. Invalid input(s): LDLCALCU  INR:   Recent Labs     09/15/20  0910 09/16/20  0451   INR 5.0* 3.7       Objective:   Vitals: /63   Pulse 67   Temp 98.5 °F (36.9 °C) (Oral)   Resp 17   Ht 5' (1.524 m)   Wt 136 lb 14.5 oz (62.1 kg)   LMP  (LMP Unknown)   SpO2 97%   BMI 26.74 kg/m²      Constitutional and General Appearance:    alert, cooperative, no distress and appears stated age  Respiratory:  · No for increased work of breathing. · On auscultation: crackles appreciated at bilateral bases  · On 3 L O2 via NC  Cardiovascular:  · The apical impulse is not displaced  · Heart tones are crisp and normal. Regular S1 and S2. No murmurs. Abdomen:   · No masses or tenderness  · Bowel sounds present  Extremities:  ·  No Cyanosis or Clubbing  ·  Lower extremity edema: trace to +1  ·  Skin: Warm and dry  Neurological:  · Alert and oriented. · Moves all extremities well    Diagnostic Studies:     ECHO 8/12/2020: Post TAVR echo. Good TAVR position with no significant abnormalities and improved mean gradient to minimal. Normal LVSf, mild MR/TR, PAP 45 mmHg.      TAVR 8/11/2020: Done by Dr. Yajaira Salinas.      CATH 10/14/19: Single vessel significant CAD, mid LAD 90%, required PTCA/ANA. Minimal disease in RCA and LCx. EF 50%. Severe AS with mean gradient 34 mmHg and valve area 0.7 cm2. Abdominal aorta gram and iliac arteries, suggest no serious stenosis. Assessment / Acute Cardiac Problems:   1. Anemia in the setting of supratherapuetic INR (INR on admission 5.0; Hb 6.4) s/p 3 units packed RBC's  2.  Supratherapuetic INR (5.0 on admission) on

## 2020-09-16 NOTE — PROGRESS NOTES
Speech Language Pathology  Facility/Department: [facility]   CLINICAL BEDSIDE SWALLOW EVALUATION    NAME: Quincy Rodney  : 1940  MRN: [id number]    ADMISSION DATE: 9/15/2020  ADMITTING DIAGNOSIS: has Peripheral vascular disease ([address] Dermatophytosis of nail; Corns and callosities; Peripheral venous insufficiency; HT (hammer toe); Carotid artery stenosis; Swelling of left lower extremity; Cellulitis of left lower limb; History of angioplasty; Aortic stenosis, severe; COPD (chronic obstructive pulmonary disease) ([address] Presence of stent in coronary artery in patient with coronary artery disease; Smoker unmotivated to quit; A-fib [facility]); Anticoagulated; Urinary retention; Atonic bladder; Hypertension; Thrombocytopenia ([facility] [address] Stage 3 chronic kidney disease ([facility] [address] Kyphosis; Coronary artery disease involving native coronary artery without angina pectoris; and Anemia on their problem list.      Date of Eval: 2020  Evaluating Therapist: Alex Woods,     Current Diet level:  Current Diet : NPO  Current Liquid Diet : NPO      Primary Complaint    The patient is a pleasant 67 y.o. female with past history of atrial fibrillation on warfarin, recent TAVR for severe aortic stenosis presented to ED with multiple episodes of epistaxis and melena with supratherapeutic INR at 5. Patient is a poor historian and is unable to give good history. She reports that for the couple of days she is having profuse epistaxis with Melena. She denied chest pain, abdominal pain, hematemesis or hematochezia. She has chronic Nevarez's catheter placed and chronic bilateral lower extremity swelling with some redness.       Pain:  Pain Assessment  Pain Level: 8    Reason for Referral  Quincy Rodney was referred for a bedside swallow evaluation to assess the efficiency of her swallow function, identify signs and symptoms of aspiration and make recommendations regarding safe dietary consistencies, effective of aspiration with all consistencies    Prognosis  Prognosis  Prognosis for safe diet advancement: fair  Individuals consulted  Consulted and agree with results and recommendations: Patient;RN    Education  Patient Education: yes  Patient Education Response: Verbalizes understanding        Therapy Time  SLP Individual Minutes  Time In: 1004  Time Out: 1027  Minutes: [id number] Willie Johnston  9/16/2020 11:25 AM

## 2020-09-16 NOTE — PROGRESS NOTES
Received call from ENT physician, told to put patient on humidfied face mask instead of nasal cannula due to nose bleeds. RT notified.

## 2020-09-16 NOTE — PROGRESS NOTES
Dr. Keyur Clayton notified via [employer] that patient had 8 beat run of Vtach. Pt. Was asymptomatic. Orders to be placed for K and Mg levels to be checked.

## 2020-09-16 NOTE — PROGRESS NOTES
Looks good clinically, no complaints. Home today per surgery. Follow up as advised. Pharmacy Note  Warfarin Consult    Cheri Arnold is a 67 y.o. female for whom pharmacy has been consulted to manage warfarin therapy. Consulting Physician: Geri Whelan  Reason for Admission: SOB, frequent nose bleeds    Warfarin dose prior to admission: 1 mg Mon, 2 mg AOD  Warfarin indication: PAF  Target INR range: 2-3     Past Medical History:   Diagnosis Date    Acute on chronic respiratory failure ([address]     TIFFANIE (acute kidney injury) ([facility] [address]     Anemia     Anticoagulated     Aortic valve disease 01/20/2020    dr Mendy Hughes     or 3/20/20     Asthma     Atonic bladder     Atrial fibrillation (HCC)     Backache, unspecified     Blood in stool     Breast cancer, left ([facility] [address] pt unsure of year, she was in her 64's    left    Cachexia ([address]     Chronic venous insufficiency     CKD (chronic kidney disease)     COPD (chronic obstructive pulmonary disease) (HCC)     Coronary atherosclerosis of unspecified type of vessel, native or graft     dr Mendy Hughes    Current smoker     Dry skin     itching    Edema     Nevarez catheter present     Full dentures     History of blood transfusion 01/20/2020    2 units blood rectal bleeding    Hyperlipidemia     Hypertension     Impaired mobility     cane    Kyphosis     Loss of weight     MI (myocardial infarction) ([address]     x2    Osteoarthritis     Poor appetite     PVD (peripheral vascular disease) (HCC)     Superficial thrombosis of leg     Unspecified sinusitis (chronic)     Unspecified urinary incontinence     Unspecified vitamin D deficiency     Wears glasses                 Recent Labs     09/15/20  0910   INR 5.0*     Recent Labs     09/15/20  0910 09/15/20  1324 09/15/20  1716   HGB 6.4* 6.6* 6.1*   HCT 21.9* 22.1* 20.5*     --   --        Current warfarin drug-drug interactions: methylprednisolone      Date             INR        Dose   9/15/2020        5.0    Hold    Daily PT/INR while inpatient. PT/INR ordered to start 9/16. Thank you for the consult.   Will continue to follow. FANNIE Candelario, PharmD  9/15/2020 10:11 PM

## 2020-09-16 NOTE — PROGRESS NOTES
vascular congestion suggestive of fluid overload. She received 1 dose of Lasix 40 mg in the ED.     When she was transferred to the floor, she started having significant respiratory distress and was requiring 6 L of oxygen through nasal cannula. She had wheezing all over her chest along with bilateral basilar crackles. She was given another dose of Lasix 40 mg along with breathing treatments leading to much improvement. She was given another PRBC transfusion with a goal of keeping her hemoglobin above 8.      Her past medical history is significant for:  -Severe aortic stenosis s/p TAVR 2020  -CAD s/p ANA LAD stenosis 10/2019  -Atrial fibrillation-on warfarin  -Dysphagia and rectal bleeding in the past>last EGD  but no record in the epic. 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 Last flex sig 2020> was unable to pass scope about 20 cm. Follow-up barium enema did not reveal any constricting lesion. OBJECTIVE     Vital Signs:  BP (!) 170/73   Pulse 74   Temp 99.2 °F (37.3 °C)   Resp 19   Ht 5' (1.524 m)   Wt 128 lb 14.4 oz (58.5 kg)   LMP  (LMP Unknown)   SpO2 100%   BMI 25.17 kg/m²     Temp (24hrs), Av.4 °F (36.9 °C), Min:97.2 °F (36.2 °C), Max:99.2 °F (37.3 °C)    In: 350   Out: 875 [Urine:875]    Physical Exam:  Constitutional: This is a well developed, well nourished, 25-29.9 - Overweight 67y.o. year old female who is alert, oriented, cooperative and in no apparent distress. Head:normocephalic and atraumatic. EENT:  PERRLA. No conjunctival injections. Septum was midline, mucosa was without erythema, exudates or cobblestoning. No thrush was noted. Neck: Supple without thyromegaly. No elevated JVP. Trachea was midline. Respiratory: Chest was symmetrical without dullness to percussion. Breath sounds bilaterally were clear to auscultation. There were no wheezes, rhonchi or rales.  There is no intercostal retraction or use of accessory muscles. No egophony noted. Cardiovascular: Regular without murmur, clicks, gallops or rubs. Abdomen: Slightly rounded and soft without organomegaly. No rebound, rigidity or guarding was appreciated. Lymphatic: No lymphadenopathy. Musculoskeletal: Normal curvature of the spine. No gross muscle weakness. Extremities:  No lower extremity edema, ulcerations, tenderness, varicosities or erythema. Muscle size, tone and strength are normal.  No involuntary movements are noted. Skin:  Warm and dry. Good color, turgor and pigmentation. No lesions or scars.   No cyanosis or clubbing  Neurological/Psychiatric: The patient's general behavior, level of consciousness, thought content and emotional status is normal.        Medications:  Scheduled Medications:    sodium chloride  20 mL Intravenous Once    atorvastatin  10 mg Oral Nightly    fluticasone  1 puff Inhalation BID    glycopyrrolate-formoterol  2 puff Inhalation BID    sodium chloride flush  10 mL Intravenous 2 times per day    pantoprazole  40 mg Intravenous BID    And    sodium chloride (PF)  10 mL Intravenous Daily    metoprolol tartrate  25 mg Oral BID    furosemide  40 mg Intravenous BID    methylPREDNISolone  40 mg Intravenous Daily    guaiFENesin  600 mg Oral BID    warfarin (COUMADIN) daily dosing (placeholder)   Other RX Placeholder     Continuous Infusions:    nitroGLYCERIN 80 mcg/min (09/16/20 0054)     PRN Medicationssodium chloride flush, 10 mL, PRN  acetaminophen, 650 mg, Q6H PRN    Or  acetaminophen, 650 mg, Q6H PRN  magnesium hydroxide, 30 mL, Daily PRN  promethazine, 12.5 mg, Q6H PRN    Or  ondansetron, 4 mg, Q6H PRN  albuterol, 10 mg, Q6H PRN  albuterol, 2.5 mg, As Directed RT PRN        Diagnostic Labs:  CBC:   Recent Labs     09/15/20  0910 09/15/20  1324 09/15/20  1716 09/16/20  0046   WBC 3.3*  --   --   --    RBC 2.19*  --   --   --    HGB 6.4* 6.6* 6.1* 7.2*   HCT 21.9* 22.1* 20.5* 23.8*   .0  --   --   -- RDW 18.4*  --   --   --      --   --   --      BMP:   Recent Labs     09/15/20  0910      K 4.6      CO2 31   BUN 43*   CREATININE 1.68*     BNP: No results for input(s): BNP in the last 72 hours. PT/INR:   Recent Labs     09/15/20  0910   PROTIME 49.1*   INR 5.0*     APTT: No results for input(s): APTT in the last 72 hours. CARDIAC ENZYMES: No results for input(s): CKMB, CKMBINDEX, TROPONINI in the last 72 hours. Invalid input(s): CKTOTAL;3  FASTING LIPID PANEL:No results found for: CHOL, HDL, TRIG  LIVER PROFILE: No results for input(s): AST, ALT, ALB, BILIDIR, BILITOT, ALKPHOS in the last 72 hours. MICROBIOLOGY:   Lab Results   Component Value Date/Time    CULTURE ESCHERICHIA COLI >625992 CFU/ML (A) 08/11/2020 10:45 AM       Imaging:    Xr Chest (2 Vw)    Result Date: 9/15/2020  Cardiomegaly, pulmonary vascular congestion, and bibasilar airspace opacities. Moderate-sized bilateral pleural effusions. ASSESSMENT & PLAN     IMPRESSION  This is a 67 y.o. female with past history of afibrillation on warfarin, recent TAVR for severe aortic stenosis presented with 2-day history of epistaxis and melena while on warfarin and found to have supratherapeutic INR at 5 along with anemia with hemoglobin at 6.4.     PLAN:     1. Acute on chronic anemia due to GI bleed-melena and epistaxis while on warfarin with admitting hemoglobin of 6.4 s/p 3 PRBC transfusion. Goal Hb >8 to avoid demand ischemia. H&H monitoring every 6 hours. Protonix 40 mg IV twice daily. GI not recommending any scope at this momeent  2. Epistasis while on warfarin-resolved. Observe. ENT consult  3. Supratherapeutic INR on warfarin-hold warfarin all other AC/AP. No impressive indication to reverse at this time  4. Congestive heart failure-on Lasix 80 mg at home. CXR with bilateral pleural effusion and vascular congestion. Continue 40 mg Lasix IV twice daily. Echo to assess systolic function  5.  COPD 2 L home oxygen-likely exacerbation. Continue methylprednisone 40 mg daily and breathing treatment  6. Hypertensive urgency-admitting blood pressure of 179/104. Continue with nitroglycerin drip and taper as per cardiology recommendations  7. Lower extremity venous insufficiency-bilateral chronic leg swellings. Continue to monitor, may do Ace wraps  8. Pain less right knee swelling-will get right knee x-ray  9. TAVR for severe aortic stenosis 12/8/2020. Post-TAVR echo revealed normal positioning of valve with normal ventricular function. 10. CAD s/p ANA for 90% LAD stenosis-stable. Denies chest pain. Continue Lipitor and metoprolol. Holding aspirin and Plavix due to bleeding     DVT ppx: Holding anticoagulation due to bleeding  GI ppx: Protonix     PT/OT/SW: On board   Discharge Planning: On board    Karen Gimenez MD  Internal Medicine Resident, PGY-1  [address], [place]  9/16/2020, 1:59 AM

## 2020-09-16 NOTE — PROGRESS NOTES
Physical Therapy  DATE: 2020    NAME: Tiny Hodges  MRN: [id number]   : 1940    Patient not seen this date for Physical Therapy due to:  [] Blood transfusion in progress  [] Hemodialysis  []  Patient Declined  [] Spine Precautions   [] Strict Bedrest  [] Surgery/ Procedure  [] Testing      [x] Other--per RN, pt to have x-ray d/t severe R knee pain; possible gout; per OT, pt absolutely unable to bear wt for stand/transfer d/t severe R knee pain; hold PT eval this date and check         [] PT being discontinued at this time. Patient independent. No further needs. [] PT being discontinued at this time as the patient has been transferred to palliative care. No further needs.     Bessie Allison, PT

## 2020-09-17 NOTE — PLAN OF CARE
Problem: Bleeding:  Goal: Will show no signs and symptoms of excessive bleeding  Description: Will show no signs and symptoms of excessive bleeding  Outcome: Ongoing     Problem: Pain:  Goal: Pain level will decrease  Description: Pain level will decrease  Outcome: Ongoing     Problem: OXYGENATION/RESPIRATORY FUNCTION  Goal: Patient will maintain patent airway  9/17/2020 1923 by Grecia Armstrong RN  Outcome: Ongoing  9/17/2020 1633 by Eloisa Bridges RCP  Outcome: Ongoing

## 2020-09-17 NOTE — FLOWSHEET NOTE
Assessment: Patient is a 67 y.o. female who was admitted to the hospital due to \"shortness of breath. \" Patient was sitting up in hospital bed, being tended to by nurses, at time of visit. Intervention:  visited patient per initial rounding visits. Patient was wearing breathing mask and shared feelings of helplessness regarding her difficulty breathing.  provided comfort and support at bedside while nurses began new IV's, causing patient some discomfort.  learned from patient that her   many years ago and her main support includes her children, who live in [place].  continued to provide support to patient in room. Outcome: Patient was receptive of 's visit and support. Plan: Chaplains can make follow-up visit, per request. Faizan Fairchild can be reached  via [employer]. Tal Chavarria     20 [id number]   Encounter Summary   Services provided to: Patient   Referral/Consult From: Rounding   Support System Children   Continue Visiting   (2020)   Complexity of Encounter Moderate   Length of Encounter 30 minutes   Routine   Type Initial   Spiritual/Alevism   Type Spiritual support   Assessment Approachable;Helplessness   Intervention Explored feelings, thoughts, concerns;Explored coping resources;Nurtured hope;Sustaining presence/ Ministry of presence; Discussed illness/injury and it's impact   Outcome Expressed gratitude;Receptive

## 2020-09-17 NOTE — PROGRESS NOTES
Dr. Jm June perfect served:    Dr. Jm June this pt. needs to be assessed. He B/P is in the 200's, NItro gtt. is on 80mcg. She is satting 92% on 10L of simple mask, stating she cannot breath. she has oral metoprolol, but is NPO because of poss. aspiration and needs swallow study. pt. is very very anxious . He hgb is 7.9     Response:  Residents on their way.

## 2020-09-17 NOTE — PROGRESS NOTES
[facility]  Internal Medicine Teaching Residency Program  Inpatient Daily Progress Note  ______________________________________________________________________________    Patient: Cyrus Hawkins  YOB: 1940   XHX:[id number]    Acct: [de-identified]     Room: 2003/2003-01  Admit date: 9/15/2020  Today's date: 09/17/20  Number of days in the hospital: 2    SUBJECTIVE   Admitting Diagnosis: Bleeding with supratherapeutic INR  CC: Epistaxis and melena while on warfarin  Pt examined at bedside. Chart & results reviewed. Started having difficulty breathing again at night with BP running high in 180s. Nitroglycerin drip was increased to titrated for BP. Receiving another PRBc transfusion for Hb<8. Her saturation started to drop with more difficulty in breathing this morning. Nitro drip running at 80mcg. B/L coarse wheeze, and basal crepitations. /100, sao2 90%, HR 85/min    BRIEF HISTORY     The patient is a pleasant 67 y.o. female with past history of atrial fibrillation on warfarin, recent TAVR for severe aortic stenosis presented to ED with multiple episodes of epistaxis and melena with supratherapeutic INR at 5. Patient is a poor historian and is unable to give good history. She reports that for the couple of days she is having profuse epistaxis with Melena. She denied chest pain, abdominal pain, hematemesis or hematochezia. She has chronic Nevarez's catheter placed and chronic bilateral lower extremity swelling with some redness.       In the ED, she was requiring 4 L of oxygen through nasal cannula and was breathing without any significant distress. Her blood pressure was elevated to 179/104 and was started on nitroglycerin drip by cardiology. She had acute on chronic anemia with hemoglobin of 6.4 requiring blood transfusion but her hemoglobin did not come up.  Chest x-ray revealed bilateral pleural effusion unchanged from the previous one, along with pulmonary vascular congestion suggestive of fluid overload. She received 1 dose of Lasix 40 mg in the ED.     When she was transferred to the floor, she started having significant respiratory distress and was requiring 6 L of oxygen through nasal cannula. She had wheezing all over her chest along with bilateral basilar crackles. She was given another dose of Lasix 40 mg along with breathing treatments leading to much improvement. She was given another PRBC transfusion with a goal of keeping her hemoglobin above 8.      Her past medical history is significant for:  -Severe aortic stenosis s/p TAVR 2020  -CAD s/p ANA LAD stenosis 10/2019  -Atrial fibrillation-on warfarin  -Dysphagia and rectal bleeding in the past>last EGD  but no record in the epic. 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 Last flex sig 2020> was unable to pass scope about 20 cm. Follow-up barium enema did not reveal any constricting lesion. OBJECTIVE     Vital Signs:  BP (!) 186/99   Pulse 79   Temp 98.2 °F (36.8 °C) (Axillary)   Resp 21   Ht 5' (1.524 m)   Wt 136 lb 14.5 oz (62.1 kg)   LMP  (LMP Unknown)   SpO2 98%   BMI 26.74 kg/m²     Temp (24hrs), Av.2 °F (36.8 °C), Min:97.3 °F (36.3 °C), Max:99 °F (37.2 °C)    In: 579   Out: 550 [Urine:550]    Physical Exam:  Constitutional: This is a well developed, well nourished, 25-29.9 - Overweight 67y.o. year old female who is alert, oriented, cooperative and in no apparent distress. Head:normocephalic and atraumatic. EENT:  PERRLA. No conjunctival injections. Septum was midline, mucosa was without erythema, exudates or cobblestoning. No thrush was noted. Neck: Supple without thyromegaly. No elevated JVP. Trachea was midline. Respiratory: B/L coarse wheezing and basal crepts in chest  Cardiovascular: Irregular rhythm  Abdomen: Slightly rounded and soft without organomegaly. No rebound, rigidity or guarding was appreciated. Lymphatic: No lymphadenopathy. Musculoskeletal: Normal curvature of the spine. No gross muscle weakness. Extremities:  No lower extremity edema, ulcerations, tenderness, varicosities or erythema. Muscle size, tone and strength are normal.  No involuntary movements are noted. Skin:  Warm and dry. Good color, turgor and pigmentation. No lesions or scars.   No cyanosis or clubbing  Neurological/Psychiatric: The patient's general behavior, level of consciousness, thought content and emotional status is normal.        Medications:  Scheduled Medications:    sodium chloride  20 mL Intravenous Once    pantoprazole  40 mg Intravenous Daily    And    sodium chloride (PF)  10 mL Intravenous Daily    ipratropium-albuterol  1 ampule Inhalation Q4H WA    sodium chloride  2 spray Each Nostril TID    bacitracin   Topical TID    furosemide  20 mg Intravenous BID    atorvastatin  10 mg Oral Nightly    fluticasone  1 puff Inhalation BID    sodium chloride flush  10 mL Intravenous 2 times per day    metoprolol tartrate  25 mg Oral BID    methylPREDNISolone  40 mg Intravenous Daily    guaiFENesin  600 mg Oral BID    warfarin (COUMADIN) daily dosing (placeholder)   Other RX Placeholder     Continuous Infusions:    nitroGLYCERIN 80 mcg/min (09/17/20 0734)     PRN Medicationsmorphine, 2 mg, Q2H PRN    Or  morphine, 4 mg, Q2H PRN  sodium chloride flush, 10 mL, PRN  acetaminophen, 650 mg, Q6H PRN    Or  acetaminophen, 650 mg, Q6H PRN  magnesium hydroxide, 30 mL, Daily PRN  promethazine, 12.5 mg, Q6H PRN    Or  ondansetron, 4 mg, Q6H PRN  albuterol, 10 mg, Q6H PRN  albuterol, 2.5 mg, As Directed RT PRN        Diagnostic Labs:  CBC:   Recent Labs     09/15/20  0910  09/16/20  0451 09/16/20  1035 09/16/20  2213 09/17/20  0357   WBC 3.3*  --  3.3*  --   --  3.6   RBC 2.19*  --  2.89*  --   --  2.67*   HGB 6.4*   < > 8.4* 8.8* 8.3* 7.9*   HCT 21.9*   < > 27.8* 29.4* 27.5* 25.8*   .0  --  96.2  --   --  96.6   RDW 18.4*  --  17.7*  --   --  18.3*     --  113*  --   --  112*    < > = values in this interval not displayed. BMP:   Recent Labs     09/15/20  0910 09/16/20  0451 09/16/20  2129 09/17/20  0357    143  --  138   K 4.6 4.2 4.0 4.0    104  --  101   CO2 31 26  --  26   BUN 43* 39*  --  47*   CREATININE 1.68* 1.50*  --  1.83*     BNP: No results for input(s): BNP in the last 72 hours. PT/INR:   Recent Labs     09/15/20  0910 09/16/20  0451 09/17/20  0357   PROTIME 49.1* 37.2* 39.6*   INR 5.0* 3.7 4.0     APTT: No results for input(s): APTT in the last 72 hours. CARDIAC ENZYMES: No results for input(s): CKMB, CKMBINDEX, TROPONINI in the last 72 hours. Invalid input(s): CKTOTAL;3  FASTING LIPID PANEL:No results found for: CHOL, HDL, TRIG  LIVER PROFILE: No results for input(s): AST, ALT, ALB, BILIDIR, BILITOT, ALKPHOS in the last 72 hours. MICROBIOLOGY:   Lab Results   Component Value Date/Time    CULTURE NO GROWTH 20 HOURS 09/16/2020 10:35 AM       Imaging:    Xr Chest (2 Vw)    Result Date: 9/15/2020  Cardiomegaly, pulmonary vascular congestion, and bibasilar airspace opacities. Moderate-sized bilateral pleural effusions. ASSESSMENT & PLAN     IMPRESSION  This is a 67 y.o. female with past history of afibrillation on warfarin, recent TAVR for severe aortic stenosis presented with 2-day history of epistaxis and melena while on warfarin and found to have supratherapeutic INR at 5 along with anemia with hemoglobin at 6.4.     PLAN:     1. Acute exacerbation of congestive heart failure-Continue to be short of breath. On 80mg Lasix at home. Repeat CXR with vascular congestion suggestive of fluid overload. Will switch back to lasix 40mg twice daily. Will give 40mg lasix now, switch to Bipap with intermittent non-rebreathable mask, gettting ABGs. Echo to assess systolic function  2. Hypertensive urgency-admitting blood pressure of 179/104.  Today's BP in 200s, continue with nitroglycerin drip and taper off accordingly  3. Acute on chronic anemia due to GI bleed-melena and epistaxis while on warfarin with admitting hemoglobin of 6.4 s/p 3 PRBC transfusion. Goal Hb >8 to avoid demand ischemia. H&H monitoring every 6 hours. Protonix 40 mg IV once daily. GI not recommending any scope at this moment. Will consider another PRBC transfusion for Hb 7.9  4. Epistasis while on warfarin-resolved. Nasal saline, bacitracin ointment and highly humidified oxygen as per ENT  5. Supratherapeutic INR while on warfarin-hold warfarin, Plavix and all other AC/AP. Repeat INR still 4. If patient continue to have blood in stools then will consider to reverse INR  6. COPD 2 L home oxygen-likely exacerbation. B/L coarse wheezing all over chest. Continue methylprednisone 40 mg daily and breathing treatment  7. Pain-less right knee swelling-knee x-ray suggestive of large effusion. Patient denies any symptoms. No intervention at this moment considering recent bleed and supra-therapeutic INR  8. TAVR for severe aortic stenosis 12/8/2020. Post-TAVR echo revealed normal positioning of valve with normal ventricular function. 9. Atrial fibrillation-rate controled. Holding warfarin due to recent bleed and supra-therapeutic INR   10. CAD s/p ANA for 90% LAD stenosis-stable. Denies chest pain. Continue Lipitor and metoprolol. Holding aspirin and Plavix due to bleeding     DVT ppx: Holding anticoagulation due to bleeding  GI ppx: Protonix     PT/OT/SW: On board   Discharge Planning: On board    Karlos Rebolledo MD  Internal Medicine Resident, PGY-1  [address], [place]  9/17/2020, 7:51 AM

## 2020-09-17 NOTE — PROGRESS NOTES
Pharmacy Note  Warfarin Consult follow-up      Recent Labs     09/17/20  0357   INR 4.0     Recent Labs     09/15/20  0910  09/16/20  0451 09/16/20  1035 09/16/20  2213 09/17/20  0357   HGB 6.4*   < > 8.4* 8.8* 8.3* 7.9*   HCT 21.9*   < > 27.8* 29.4* 27.5* 25.8*     --  113*  --   --  112*    < > = values in this interval not displayed. Significant Drug-Drug Interactions:  New warfarin drug-drug interactions: none  Discontinued drug-drug interactions: none  Current warfarin drug-drug interactions:  acetaminophen, methylprednisolone    Date INR Dose   9/15/2020 5.0 none   9/16/2020 3.7 HOLD   9/17/2020 4.0 Hold       Notes:      Hold for elevated INR. Daily PT/INR while inpatient. [address] [place] [address]  Ph., CACP, Clinical Pharmacist  Anticoagulation Services, [facility]  9/17/2020  12:00 PM

## 2020-09-17 NOTE — PLAN OF CARE
Problem: Skin Integrity:  Goal: Will show no infection signs and symptoms  Description: Will show no infection signs and symptoms  Outcome: Ongoing     Problem: Falls - Risk of:  Goal: Will remain free from falls  Description: Will remain free from falls  Outcome: Ongoing     Problem: Bleeding:  Goal: Will show no signs and symptoms of excessive bleeding  Description: Will show no signs and symptoms of excessive bleeding  Outcome: Ongoing     Problem: Pain:  Goal: Pain level will decrease  Description: Pain level will decrease  Outcome: Ongoing     Problem: OXYGENATION/RESPIRATORY FUNCTION  Goal: Patient will maintain patent airway  9/17/2020 1923 by Raji Vargas RN  Outcome: Ongoing  9/17/2020 1633 by Nate Hinds RCP  Outcome: Ongoing     Problem: Pain:  Goal: Control of acute pain  Description: Control of acute pain  Outcome: Ongoing

## 2020-09-17 NOTE — PROCEDURES
INSTRUMENTAL SWALLOW REPORT  MODIFIED BARIUM SWALLOW    NAME: Luann Adams   : 1940  MRN: [id number]       Date of Eval: 2020              Referring Diagnosis(es):      Past Medical History:  has a past medical history of Acute on chronic respiratory failure ([address] TIFFANIE (acute kidney injury) ([address] Anemia, Anticoagulated, Aortic valve disease, Asthma, Atonic bladder, Atrial fibrillation ([address] Backache, unspecified, Blood in stool, Breast cancer, left ([address] Cachexia ([address] Chronic venous insufficiency, CKD (chronic kidney disease), COPD (chronic obstructive pulmonary disease) ([address] Coronary atherosclerosis of unspecified type of vessel, native or graft, Current smoker, Dry skin, Edema, Nevarez catheter present, Full dentures, History of blood transfusion, Hyperlipidemia, Hypertension, Impaired mobility, Kyphosis, Loss of weight, MI (myocardial infarction) ([address] Osteoarthritis, Poor appetite, PVD (peripheral vascular disease) ([address] Superficial thrombosis of leg, Unspecified sinusitis (chronic), Unspecified urinary incontinence, Unspecified vitamin D deficiency, and Wears glasses. Past Surgical History:  has a past surgical history that includes Hysterectomy, vaginal (1974); Appendectomy; Mastectomy (Left); joint replacement (Right); eye surgery (Bilateral, 2015); Total shoulder arthroplasty (Left, 2016); Colonoscopy; Cardiac catheterization (10/14/2019); and sigmoidoscopy (N/A, 2020). Current Diet Solid Consistency: Regular  Current Diet Liquid Consistency: Thin       Type of Study: Initial MBS      Patient Complaints/Reason for Referral:  Luann Adams was referred for a MBS to assess the efficiency of his/her swallow function, assess for aspiration, and to make recommendations regarding safe dietary consistencies, effective compensatory strategies, and safe eating environment.        Onset of problem:      The patient is a pleasant 67 y.o. female with past history of atrial fibrillation on warfarin, recent TAVR for severe aortic stenosis presented to ED with multiple episodes of epistaxis and melena with supratherapeutic INR at 5. Patient is a poor historian and is unable to give good history. She reports that for the couple of days she is having profuse epistaxis with Melena. She denied chest pain, abdominal pain, hematemesis or hematochezia. She has chronic Nevarez's catheter placed and chronic bilateral lower extremity swelling with some redness. Behavior/Cognition/Vision/Hearing:  Behavior/Cognition: Alert; Cooperative  Vision: Impaired  Hearing: Exceptions to [facility]    Impressions:  Patient presents with  safe swallow for Regular diet with thin liquids as evidenced by no observed aspiration noted with consistencies tested. Recommend small sips and bites, only feed when alert and awake and upright at 90 degrees for all PO intake. Recommend close monitoring for overt/clinical s/s of aspiration and D/C PO intake and complete Modified Barium Swallow Study should they occur. Results and recommendations reported to RN. Patient Position: Lateral and Patient Degrees: 90      Consistencies Administered: Dysphagia Soft and Bite-Sized (Dysphagia III); Reg solid; Dysphagia Pureed (Dysphagia I); Thin straw;Nectar  teaspoon;Nectar straw    Compensatory Swallowing Strategies Attempted: Eat/Feed slowly;Upright as possible for all oral intake;Small bites/sips  Postural Changes and/or Swallow Maneuvers Trialed: Upright 90 degrees      Recommended Diet:  Solid consistency: Regular  Liquid consistency: Thin  Liquid administration via: Straw;Cup    Medication administration: PO    Safe Swallow Protocol:  Supervision: Distant  Compensatory Swallowing Strategies:  Alternate solids and liquids;Eat/Feed slowly;Upright as possible for all oral intake;Small bites/sips      Recommendations/Treatment  Requires SLP Intervention: Yes           Postural Changes and/or Swallow Maneuvers: Upright 90 degrees      Recommended Exercises:    Therapeutic Interventions: Diet tolerance monitoring;Pharyngeal exercises; Tongue base strengthening         Education: Images and recommendations were reviewed with pt following this exam.   Patient Education: yes  Patient Education Response: Verbalizes understanding    Prognosis  Prognosis for safe diet advancement: good  Duration/Frequency of Treatment  Duration/Frequency of Treatment: 2-3X/week      Goals:    Long Term:     To Maximize safety with intake, optimize nutrition/hydration and minimize risk for aspiration. Short Term:    Goals: The patient will tolerate recommended diet without observed clinical signs of aspiration      Oral Preparation / Oral Phase  Oral Phase: WFL  Oral Phase: Adequate A-P transit and oral manipulation for consistencies tested    Pharyngeal Phase  Pharyngeal Phase: WFL    Pharyngeal: Thick tsp and straw, Thin straw: No penetration no aspiration with min vallec and pyriform stasis.   Puree:/[employer]ruit [employer]: No penetration and no aspiration with mild vallec and pyriform stasis        Dysphagia Outcome Severity Scale: Level 6: Within functional limits/Modified independence  Penetration-Aspiration Scale (PAS): 1 - Material does not enter the airway        Esophageal Phase  Esophageal Screen: Impaired    Upper Esophageal Screen: ? spasm with reflux noted    Pain   Patient Currently in Pain: No  Pain Level: 0  Pain Type: Acute pain  Pain Location: Knee      Therapy Time:   Individual Concurrent Group Co-treatment   Time In 1400         Time Out 1415         Minutes 15                   Sarah Beth Roach, 9/17/2020, 2:56 PM

## 2020-09-17 NOTE — PROGRESS NOTES
IM team paged:  pt. on 100mcg of nitro. she passed her swallow study. primary team states if she passes they will start Norvasc. do you want to start so the nitro can start to get weaned off?

## 2020-09-17 NOTE — PROGRESS NOTES
Physical Therapy  DATE: 2020    NAME: Jasmin Glaser  MRN: [id number]   : 1940    Patient not seen this date for Physical Therapy due to:  [] Blood transfusion in progress  [] Hemodialysis  []  Patient Declined  [] Spine Precautions   [] Strict Bedrest  [] Surgery/ Procedure  [] Testing      [x] Other: continuous bipap        [] PT being discontinued at this time. Patient independent. No further needs. [] PT being discontinued at this time as the patient has been transferred to palliative care. No further needs.     Castillo Timmons, PT

## 2020-09-17 NOTE — DISCHARGE INSTR - COC
Continuity of Care Form    Patient Name: Flakita Cruz   :  1940  MRN:  [id number]    [facility] date:  9/15/2020  Discharge date:    Code Status Order: Full Code   Advance Directives:   [facility] Documentation       Date/Time Healthcare Directive Type of Healthcare Directive Copy in [address] Agent's Name Healthcare Agent's Phone Number    09/15/20 2026  No, patient does not have an advance directive for healthcare treatment -- -- -- -- --            Admitting Physician:  Berenice Centeno MD  PCP: Lele Fair MD    Discharging Nurse: [facility] Unit/Room#:   Discharging Unit Phone Number: [phone]    Emergency Contact:   Extended Emergency Contact Information  Primary Emergency Contact: Ann Marie Rizzo Phone: [phone]  Mobile Phone: [phone]  Relation: Child   needed?  No  Secondary Emergency Contact: Carlyn Garcia [address] Phone: [phone]  Mobile Phone: [phone]  Relation: Child    Past Surgical History:  Past Surgical History:   Procedure Laterality Date    APPENDECTOMY      CARDIAC CATHETERIZATION  10/14/2019    COLONOSCOPY      EYE SURGERY Bilateral 2015    cataracts/[place]    HYSTERECTOMY, VAGINAL  1974    JOINT REPLACEMENT Right     Knee    MASTECTOMY Left     pt unsure of year, she was in her 64's     SHOULDER ARTHROPLASTY Left 2016    SIGMOIDOSCOPY N/A 2020    SIGMOIDOSCOPY DIAGNOSTIC FLEXIBLE performed by Felipe Pineda IV, DO at [facility]       Immunization History:   Immunization History   Administered Date(s) Administered    Influenza Virus Vaccine 2014    Pneumococcal Polysaccharide ([id number]) 10/28/2006       Active Problems:  Patient Active Problem List   Diagnosis Code    Peripheral vascular disease ([facility] I73.9    Dermatophytosis of nail B35.1    Corns and callosities L84    Peripheral venous insufficiency I87.2    HT (hammer toe) M20.40    Carotid artery stenosis I65.29    Swelling of left lower extremity M79.89    Cellulitis of left lower limb L03. 80    History of angioplasty Z98.62    Aortic stenosis, severe I35.0    COPD (chronic obstructive pulmonary disease) ([facility]) J44.9    Presence of stent in coronary artery in patient with coronary artery disease I25.10, Z95.5    Smoker unmotivated to quit F17.200    A-fib ([facility] I48.91    Anticoagulated Z79.01    Urinary retention R33.9    Atonic bladder N31.2    Hypertension I10    Thrombocytopenia ([facility]) D69.6    Stage 3 chronic kidney disease ([facility]) N18.3    Kyphosis M40.209    Coronary artery disease involving native coronary artery without angina pectoris I25.10    Other specified anemias D64.89    TIFFANIE (acute kidney injury) ([facility] N17.9    Elevated brain natriuretic peptide (BNP) level R79.89       Isolation/Infection:   Isolation            No Isolation          Patient Infection Status       Infection Onset Added Last Indicated Last Indicated By Review Planned Expiration Resolved Resolved By    None active    Resolved    COVID-19 Rule Out 09/16/20 09/16/20 09/16/20 COVID-19 (Ordered)   09/17/20 Rule-Out Test Resulted    COVID-19 Rule Out 08/10/20 08/10/20 08/10/20 COVID-19 (Ordered)   08/10/20 Rule-Out Test Resulted            Nurse Assessment:  Last Vital Signs: /67   Pulse 60   Temp 98 °F (36.7 °C)   Resp 24   Ht 5' (1.524 m)   Wt 136 lb 14.5 oz (62.1 kg)   LMP  (LMP Unknown)   SpO2 94%   BMI 26.74 kg/m²     Last documented pain score (0-10 scale): Pain Level: 0  Last Weight:   Wt Readings from Last 1 Encounters:   09/16/20 136 lb 14.5 oz (62.1 kg)     Mental Status:  oriented and alert    IV Access:  - None    Nursing Mobility/ADLs:  Walking   Assisted  Transfer dependent  Bathing  Dependent  Dressing  Dependent  Toileting  Dependent  Feeding  Assisted  Med Admin  Assisted  Med Delivery   whole and prefers mixed with applesauce    Wound Care Documentation and Therapy:  Wound 08/12/20 Hand Left;Posterior (Active)   Number of days: 36        Elimination:  Continence:   · Bowel: yes  · Bladder: Yes  Urinary Catheter: Indication for Use of Catheter: Myles Zuñiga AIDA Indwelling UISB:[id number]} has chronic ruiz  Colostomy/Ileostomy/Ileal Conduit: {YES / SQ:03730}       Date of Last BM: 9/21/2020    Intake/Output Summary (Last 24 hours) at 9/17/2020 1323  Last data filed at 9/17/2020 4007  Gross per 24 hour   Intake 1188.73 ml   Output 550 ml   Net 638.73 ml     I/O last 3 completed shifts: In: 574 [P.O.:600; I.V.:279]  Out: 550 [Urine:550]    Safety Concerns: At Risk for Falls    Impairments/Disabilities:      Vision  hearing  Nutrition Therapy:  Current Nutrition Therapy:   - Oral Diet:  Carb Control 4 carbs/meal (1800kcals/day) and Cardiac    Routes of Feeding: Oral  Liquids: No Restrictions  Daily Fluid Restriction: no  Last Modified Barium Swallow with Video (Video Swallowing Test): not done    Treatments at the Time of Hospital Discharge:   Respiratory Treatments: oxygen nasal cannula 2/L  Oxygen Therapy:  is on oxygen at 2 L/min per nasal cannula.   Ventilator:    { CC Vent NREQ:[id number]}     Rehab Therapies: Physical Therapy OCCUPATIONAL THERAPY  Weight Bearing Status/Restrictions: No weight bearing restirctions  Other Medical Equipment (for information only, NOT a DME order):  walker  Other Treatments: skilled nursing, [address] [facility] services    Patient's personal belongings (please select all that are sent with patient):  {[facility] DME Belongings:[id number]}    RN SIGNATURE:  Electronically signed by Jose Anguiano RN on 9/21/20 at 4:42 PM EDT    CASE MANAGEMENT/SOCIAL WORK SECTION    Inpatient Status Date: 9-    Readmission Risk Assessment Score:  Readmission Risk              Risk of Unplanned Readmission:        19           Discharging to Facility/ Agency   · Name: [facility]  · Address:  · Phone:  · Fax:    Dialysis Facility (if applicable) · Name:  · Address:  · Dialysis Schedule:  · Phone:  · Fax:    / signature: Electronically signed by Grady Paniagua RN on 9/21/20 at 4:25 PM EDT    PHYSICIAN SECTION    Prognosis: Fair    Condition at Discharge: Stable    Rehab Potential (if transferring to Rehab): Fair    Recommended Labs or Other Treatments After Discharge: hemoglobin in 7 days on 9/28/20, results to PCP, please come back to the emergency department if blood in stool or sputum, stop coumadin (warfarin) and start taking Eliquis on discharge, needs a home health aid  and nursing  Oxygen via nasal cannula to keep sats>92%  PT and OT eval and treat   [address]  Hem/Hct on 8-    Physician Certification: I certify the above information and transfer of Zuleima Prieto  is necessary for the continuing treatment of the diagnosis listed and that she requires Home Care for greater 30 days.      Update Admission H&P: No change in H&P    PHYSICIAN SIGNATURE:  Electronically signed by Isabelle Christopher MD on 9/21/20 at 12:00 PM EDT

## 2020-09-17 NOTE — PROGRESS NOTES
[facility]  Progress Note                   Date:   9/17/2020  Patient name: Lorenzo Powers  Date of admission:  9/15/2020  8:42 AM  MRN:   [id number]  YOB: 1940  PCP: Hemant Langley MD    Reason for Admission: Anemia [D64.9]    Subjective:       Clinical Changes /Abnormalities:Patient seen and examined in room after discussion with primary and Dr. Lydia Lopez regarding elevated INR. Okay per Dr. Lydia Lopez to reverse. Plavix and coumadin to restart when INR stable and okay with GI. Patient denies chest pain. Currently on bipap sitting upright in bed. Nitro drip on. Afib on monitor rate controlled. Review of Systems    Medications:   Scheduled Meds:   pantoprazole  40 mg Intravenous Daily    And    sodium chloride (PF)  10 mL Intravenous Daily    ipratropium-albuterol  1 ampule Inhalation Q4H WA    sodium chloride  2 spray Each Nostril TID    bacitracin   Topical TID    furosemide  20 mg Intravenous BID    atorvastatin  10 mg Oral Nightly    fluticasone  1 puff Inhalation BID    sodium chloride flush  10 mL Intravenous 2 times per day    metoprolol tartrate  25 mg Oral BID    methylPREDNISolone  40 mg Intravenous Daily    guaiFENesin  600 mg Oral BID    warfarin (COUMADIN) daily dosing (placeholder)   Other RX Placeholder     Continuous Infusions:   nitroGLYCERIN 90 mcg/min (09/17/20 0845)     CBC:   Recent Labs     09/15/20  0910  09/16/20  0451 09/16/20  1035 09/16/20 2213 09/17/20  0357   WBC 3.3*  --  3.3*  --   --  3.6   HGB 6.4*   < > 8.4* 8.8* 8.3* 7.9*     --  113*  --   --  112*    < > = values in this interval not displayed.      BMP:    Recent Labs     09/15/20  0910 09/16/20  0451 09/16/20 2129 09/17/20  0357    143  --  138   K 4.6 4.2 4.0 4.0    104  --  101   CO2 31 26  --  26   BUN 43* 39*  --  47*   CREATININE 1.68* 1.50*  --  1.83*   GLUCOSE 83 93  --  125*     Hepatic:No results for input(s): AST, ALT, ALB, BILITOT, ALKPHOS in the last 72 hours. Troponin:   Recent Labs     09/16/20  0451 09/16/20  1035 09/17/20  0357   TROPHS 87* 92* 97*     BNP: No results for input(s): BNP in the last 72 hours. Lipids: No results for input(s): CHOL, HDL in the last 72 hours. Invalid input(s): LDLCALCU  INR:   Recent Labs     09/15/20  0910 09/16/20  0451 09/17/20  0357   INR 5.0* 3.7 4.0       Objective:   Vitals: /67   Pulse 60   Temp 97.5 °F (36.4 °C) (Axillary)   Resp 24   Ht 5' (1.524 m)   Wt 136 lb 14.5 oz (62.1 kg)   LMP  (LMP Unknown)   SpO2 94%   BMI 26.74 kg/m²   General appearance: alert and cooperative with exam  HEENT: Head: Normocephalic, no lesions, without obvious abnormality. Neck:no JVD, trachea midline, no adenopathy  Lungs: lung sounds course wheezes  throughout to auscultation  Heart: Irregular rate and rhythm, s1/s2 auscultated, + murmurs  Afib on tele Rate controlled. Abdomen: soft, non-tender, bowel sounds active  Extremities: no edema  Neurologic: not done        Assessment / Acute Cardiac Problems:   1. Anemia in the setting of supratherapuetic INR (INR on admission 5.0; Hb 6.4) s/p 3 units packed RBC's  2. Supratherapuetic INR (5.0 on admission) on coumadin 2/2 to TAVR  3. Severe AS s/p TAVR  4. Paroxysmal Atrial Fibrillation on coumadin  5. CAD s/p PTCA-ANA to LAD (September 2019)  6. Smoker  7.  COPD      Patient Active Problem List:     Peripheral vascular disease ([facility] [address]     Dermatophytosis of nail     Corns and callosities     Peripheral venous insufficiency     HT (hammer toe)     Carotid artery stenosis     Swelling of left lower extremity     Cellulitis of left lower limb     History of angioplasty     Aortic stenosis, severe     COPD (chronic obstructive pulmonary disease) ([facility] [address]     Presence of stent in coronary artery in patient with coronary artery disease     Smoker unmotivated to quit     A-fib ([facility] [address]     Anticoagulated     Urinary retention     Atonic bladder     Hypertension     Thrombocytopenia ([facility] [address]     Stage 3 chronic kidney disease ([facility] [address]     Kyphosis     Coronary artery disease involving native coronary artery without angina pectoris     Other specified anemias     TIFFANIE (acute kidney injury) ([facility] [address]     Elevated brain natriuretic peptide (BNP) level      Plan of Treatment:   1. Anemia followed by primary and GI  INR 4.0 today. Discussed with Dr. Lennox Chambers request by primary to reverse d/t elevated INR. Okay per Dr Lennox Chambers to reverse. 2. Severe AS s/p TAVR. Plavix on hold d/t GI bleed, epistaxis, and elevated INR. Plavix to resume when INR stable. 3. PAF rate controlled. Coumadin on hold d/t supratheraputic INR and GI bleed  Continue BB  4. CAD  S/p PTCA 2019  Continue statin, BB.  ASA and Plavix held d/t GI bleed. 5. Acute on Chronic HFpEF.       Electronically signed by MASON Burr NP on 9/17/2020 at 11:54 [address] [place] [address].  [phone]

## 2020-09-17 NOTE — PROGRESS NOTES
Cardiology fellow paged:  MT, pt. b/p in 200's nitro on 90mcg, gave pt. 10mg of IV Labetolol. pt. b/p is high 160's now. IM team at bedside and 40mg IV Lasix given for 1x dose, pt now also on cont. Bipap      Response: Will see pt. Shortly    Update for fellow:b/p is now 150's.

## 2020-09-17 NOTE — PROGRESS NOTES
[facility] NAVA[facility] Anemia [D64.9] . Vitals:    09/17/20 1624   BP:    Pulse:    Resp: 17   Temp:    SpO2:    . Patients home meds are   Prior to Admission medications    Medication Sig Start Date End Date Taking? Authorizing Provider   valACYclovir (VALTREX) 1 g tablet Take 1,000 mg by mouth 3 times daily Last dose on 9/14. Three more days of cycle left per son. Yes Historical Provider, MD   celecoxib (CELEBREX) 200 MG capsule Take 200 mg by mouth daily    Yes Historical Provider, MD   losartan (COZAAR) 50 MG tablet Take 50 mg by mouth daily    Yes Historical Provider, MD   furosemide (LASIX) 80 MG tablet Take 80 mg by mouth 2 times daily   Yes Historical Provider, MD   umeclidinium-vilanterol (ANORO ELLIPTA) 62.5-25 MCG/INH AEPB inhaler Inhale 1 puff into the lungs daily 6/3/20  Yes Marcos Hill MD   warfarin (COUMADIN) 1 MG tablet Take 1 mg by mouth daily monday   Yes Historical Provider, MD   hydrochlorothiazide (MICROZIDE) 12.5 MG capsule Take 12.5 mg by mouth daily   Yes Historical Provider, MD   metoprolol tartrate (LOPRESSOR) 25 MG tablet Take 0.5 tablets by mouth 2 times daily 10/15/19  Yes MASON Garduno CNP   clopidogrel (PLAVIX) 75 MG tablet Take 1 tablet by mouth daily 10/16/19  Yes MASON Garduno CNP   albuterol (ACCUNEB) 1.25 MG/3ML nebulizer solution Inhale 1 ampule into the lungs every 6 hours as needed for Wheezing   Yes Historical Provider, MD   FLOVENT  MCG/ACT inhaler Inhale 2 puffs into the lungs 2 times daily  7/28/15  Yes Historical Provider, MD   PROVENTIL  (90 BASE) MCG/ACT inhaler Inhale 1 puff into the lungs 4 times daily  4/15/15  Yes Historical Provider, MD   atorvastatin (LIPITOR) 10 MG tablet Take 10 mg by mouth daily Take 1/2 tablet daily.    Yes Historical Provider, MD   warfarin (COUMADIN) 4 MG tablet Take 2 mg by mouth daily [id number]   Yes Historical Provider, MD   montelukast (SINGULAIR) 60 62 64 66 68 70 Age            21 344 357 [phone]  60 62 64 66 68 70 72 74 76   25 337 352 366 381 396 411 426 441 25 447 476 505 533 562 [phone]   30 329 344 359 374 389 404 419 434 30 437 466 494 523 552 [phone] 667   35 322 337 351 366 381 396 411 426 35 426 455 484 512 541 [phone]   40 314 329 344 359 374 389 404 419 40 416 445 473 502 531 559 588 617 647   45 307 322 336 351 366 381 396 411 45 405 434 463 491 520 549 577 606 636   50 299 314 329 344 359 374 389 404 50 395 424 452 481 510 538 567 596 625   55 292 307 321 336 351 366 381 396 55 384 413 442 470 499 528 556 585 615   60 284 299 314 329 344 359 374 389 60 374 403 431 460 489 517 546 575 605   65 277 292 306 321 336 351 366 381 65 363 392 421 449 478 507 535 564 594   70 269 284 299 314 329 344 359 374 70 353 382 410 439 [phone] 554 583   75 261 274 289 305 319 334 348 364 75 344 372 400 429 458 487 515 544 573   80 253 266 282 296 312 327 342 356 80 335 362 390 419 448 476 [phone]

## 2020-09-17 NOTE — CONSULTS
Orthopedic Surgery Consult  (Dr. Annabelle Cohen)    CC/Reason for consult:  Right knee pain    HPI:      The patient is a 67 y.o. female with above complaint being consulted for further evaluation. Patient presented to the emergency department yesterday on 9/16/2020 for multiple episodes of epistaxis and melena with a supratherapeutic INR at 5 and swollen bilateral lower extremities. Patient was admitted for medical management. Imaging performed inpatient by the primary team for complain of right knee pain demonstrated a history of total knee arthroplasty from Dr. Nolberto Galvan approximately 10 years ago with large effusion. Patient reports she noticed right knee pain three weeks ago. The pain is noted to be diffusely about the anterior aspect of the knee. Patient has also had an orthopedic procedure performed to the left shoulder to which she is unsure of. She reports to have seen Dr. Nolberto Galvan for her shoulder surgery last month. She reports she did not have pain to the right knee then. Patient has significant medical history and being treated on admission for CHF exacerbation, HTN urgency, acute on chronic anemia, supratherapeutic INR, and CAD. Denies numbness/tingling     Vitals were reviewed. Patient does not complain of any other orthopedic issues.     Past Medical History:    Past Medical History:   Diagnosis Date    Acute on chronic respiratory failure ([address]     TIFFANIE (acute kidney injury) ([address]     Anemia     Anticoagulated     Aortic valve disease 01/20/2020    dr Anna Saab     or 3/20/20     Asthma     Atonic bladder     Atrial fibrillation (HCC)     Backache, unspecified     Blood in stool     Breast cancer, left ([address] pt unsure of year, she was in her 64's    left    Cachexia ([address]     Chronic venous insufficiency     CKD (chronic kidney disease)     COPD (chronic obstructive pulmonary disease) ([address]     Coronary atherosclerosis of unspecified type of vessel, native or graft     dr Erendira Phan Current smoker     Dry skin     itching    Edema     Nevarez catheter present     Full dentures     History of blood transfusion 01/20/2020    2 units blood rectal bleeding    Hyperlipidemia     Hypertension     Impaired mobility     cane    Kyphosis     Loss of weight     MI (myocardial infarction) (HCC)     x2    Osteoarthritis     Poor appetite     PVD (peripheral vascular disease) (HCC)     Superficial thrombosis of leg     Unspecified sinusitis (chronic)     Unspecified urinary incontinence     Unspecified vitamin D deficiency     Wears glasses        Past Surgical History:    Past Surgical History:   Procedure Laterality Date    APPENDECTOMY      CARDIAC CATHETERIZATION  10/14/2019    COLONOSCOPY      EYE SURGERY Bilateral 12/2015    cataracts/,[place]    HYSTERECTOMY, VAGINAL  8/13/1974    JOINT REPLACEMENT Right     Knee    MASTECTOMY Left     pt unsure of year, she was in her 64's     SHOULDER ARTHROPLASTY Left 2016    SIGMOIDOSCOPY N/A 2/27/2020    SIGMOIDOSCOPY DIAGNOSTIC FLEXIBLE performed by Debbie Pineda IV, DO at [facility]       Medications Prior to Admission:   Prior to Admission medications    Medication Sig Start Date End Date Taking? Authorizing Provider   valACYclovir (VALTREX) 1 g tablet Take 1,000 mg by mouth 3 times daily Last dose on 9/14. Three more days of cycle left per son.    Yes Historical Provider, MD   celecoxib (CELEBREX) 200 MG capsule Take 200 mg by mouth daily    Yes Historical Provider, MD   losartan (COZAAR) 50 MG tablet Take 50 mg by mouth daily    Yes Historical Provider, MD   furosemide (LASIX) 80 MG tablet Take 80 mg by mouth 2 times daily   Yes Historical Provider, MD   umeclidinium-vilanterol (ANORO ELLIPTA) 62.5-25 MCG/INH AEPB inhaler Inhale 1 puff into the lungs daily 6/3/20  Yes Greer Brush MD   warfarin (COUMADIN) 1 MG tablet Take 1 mg by mouth daily monday   Yes Historical Provider, MD   hydrochlorothiazide (MICROZIDE) 12.5 MG Alcohol use: No     Alcohol/week: 0.0 standard drinks    Drug use: No    Sexual activity: None   Lifestyle    Physical activity     Days per week: None     Minutes per session: None    Stress: None   Relationships    Social connections     Talks on phone: None     Gets together: None     Attends Jehov[employer] witness service: None     Active member of club or organization: None     Attends meetings of clubs or organizations: None     Relationship status: None    Intimate partner violence     Fear of current or ex partner: None     Emotionally abused: None     Physically abused: None     Forced sexual activity: None   Other Topics Concern    None   Social History Narrative    None       Family History:  Family History   Problem Relation Age of Onset    Other Mother         COPD    Arthritis Mother     Arthritis Father     Cancer Sister         breast    Heart Disease Brother        REVIEW OF SYSTEMS:   Constitutional: Negative for fever and chills. Cardiovascular: Negative for chest pain and palpitations. Musculoskeletal: Positive for myalgia to right knee  Skin: Negative for itching and rash. PHYSICAL EXAM:  Blood pressure 135/67, pulse 60, temperature 98.1 °F (36.7 °C), temperature source Axillary, resp. rate 17, height 5' (1.524 m), weight 136 lb 14.5 oz (62.1 kg), SpO2 94 %, not currently breastfeeding. Gen: Alert and oriented, NAD, cooperative    Chest: Non labored breathing. Cardiovascular: Regular rate, no dependent edema    Respiratory: Chest symmetric, no accessory muscle use    RLE: Significant effusion noted to right knee. Tenderness to palpation diffusely at the anterior aspect of the knee. Patient able to maintain knee extension. Knee ROM 0-70 degrees flexion. EHL/FHL/TA/GS complex motor intact. Sural, saphenous, superificial/deep peroneal, and plantar nerve distribution SILT.  Dorsalis pedis/posterior tibial pulses 2+ with BCR    LABS:  Recent Labs     09/17/20  0357  09/17/20  1632   WBC 3.6  --   --    HGB 7.9*   < > 9.6*   HCT 25.8*   < > 29.3*   *  --   --    INR 4.0  --   --      --   --    K 4.0  --   --    BUN 47*  --   --    CREATININE 1.83*  --   --    GLUCOSE 125*  --   --     < > = values in this interval not displayed. Radiology:    XR of right knee demonstrating intact total knee arthroplasty with no signs of loosening or osteolysis    Impression 67 y.o. female being seen after right knee pain for the following problems:  1) Right knee pain s/p total knee arthroplasty with swelling    Plan  - Under sterile technique, right knee was aspirated, procedure note below  - F/u culture and aspiration  - F/u CRP 23  - Weight bearing status: weight bearing as tolerated right lower extremity   - Pain control at primary discretion  - Ice (20 minutes on and off 1 hour) and elevate above the level of the heart  to reduce swelling and throbbing pain. - DVT ppx: at primary discretion  - PT/OT  - Dispo: pending culture results  - Please page Ortho with any questions or concerns    Procedure: Risks, benefits, and alternatives have been discussed regarding arthrocentesis of the joint effusion. Patient agreed to move forward with the proposed procedure. After sterile prep of the right knee we proceeded to insert an 18 gauge needle into the joint, aspirating 13 cc of bloody fluid. Joint fluids at this point were sent to lab for analysis. Patient tolerated the procedure well and expressed interval improvement in symptoms. All questions and concerns were addressed at this point.     Dallas Sims DO  Orthopedic Surgery Resident, PGY-2  1 [facility]

## 2020-09-17 NOTE — PLAN OF CARE
Problem: OXYGENATION/RESPIRATORY FUNCTION  Goal: Patient will maintain patent airway  Outcome: Ongoing  Goal: Patient will achieve/maintain normal respiratory rate/effort  Description: Respiratory rate and effort will be within normal limits for the patient  Outcome: Ongoing   BRONCHOSPASM/BRONCHOCONSTRICTION     [x]         IMPROVE AERATION/BREATH SOUNDS  [x]   ADMINISTER BRONCHODILATOR THERAPY AS APPROPRIATE  [x]   ASSESS BREATH SOUNDS  [x]   IMPLEMENT AEROSOL/MDI PROTOCOL  [x]   PATIENT EDUCATION AS NEEDED   NON INVASIVE VENTILATION  PROVIDE OPTIMAL VENTILATION/ACCEPTABLE SP02  IMPLEMENT NON INVASIVE VENTILATION PROTOCOL  ASSESSMENT SKIN INTEGRITY  PATIENT EDUCATION AS NEEDED  BIPAP AS NEEDED

## 2020-09-17 NOTE — PROGRESS NOTES
Pt. Back from swallow study and complaining of dyspnes. Pt. On 6L of 02 at 97% oxygen. Placed on Bipap and asking for it to be turned up. Still stating she cannot breath. Wet cough present. Resp. Therapist called for assistance. They state they will be here shortly.

## 2020-09-17 NOTE — PLAN OF CARE
Problem: Skin Integrity:  Goal: Will show no infection signs and symptoms  Description: Will show no infection signs and symptoms  Outcome: Ongoing  Goal: Absence of new skin breakdown  Description: Absence of new skin breakdown  Outcome: Ongoing     Problem: Falls - Risk of:  Goal: Will remain free from falls  Description: Will remain free from falls  Outcome: Ongoing  Goal: Absence of physical injury  Description: Absence of physical injury  Outcome: Ongoing     Problem: Bleeding:  Goal: Will show no signs and symptoms of excessive bleeding  Description: Will show no signs and symptoms of excessive bleeding  Outcome: Ongoing     Problem: Pain:  Goal: Pain level will decrease  Description: Pain level will decrease  Outcome: Ongoing  Goal: Control of acute pain  Description: Control of acute pain  Outcome: Ongoing  Goal: Control of chronic pain  Description: Control of chronic pain  Outcome: Ongoing

## 2020-09-18 PROBLEM — I50.33 ACUTE ON CHRONIC DIASTOLIC CHF (CONGESTIVE HEART FAILURE) (HCC): Status: ACTIVE | Noted: 2020-01-01

## 2020-09-18 PROBLEM — J96.21 ACUTE ON CHRONIC RESPIRATORY FAILURE WITH HYPOXIA (HCC): Status: ACTIVE | Noted: 2020-01-01

## 2020-09-18 NOTE — PROGRESS NOTES
[facility]  Progress Note                   Date:   9/18/2020  Patient name: Ofelia Herrera  Date of admission:  9/15/2020  8:42 AM  MRN:   [id number]  YOB: 1940  PCP: Vincenzo Martin MD    Reason for Admission: Anemia [D64.9]    Subjective:       Clinical Changes /Abnormalities:Patient seen and examined in room with son and nurses. Patient states she feels much better today. Up in chair, on NC oxygen, and without distress. Had echo completed this AM, read pending. Son concerned with mom restarting Coumadin and questioning if there are any other options. Pt. States she \"saw on TV those other ones where I don't have to get blood taken from me all of the time. \"    Tele Afib 70-80s. Vitals stable. Review of Systems    Medications:   Scheduled Meds:   ipratropium-albuterol  1 ampule Inhalation 4x daily    [START ON 9/19/2020] albuterol  2.5 mg Nebulization BID    furosemide  40 mg Intravenous Once    methylPREDNISolone  80 mg Intravenous Once    pantoprazole  40 mg Intravenous Daily    And    sodium chloride (PF)  10 mL Intravenous Daily    amLODIPine  10 mg Oral Daily    sodium chloride  2 spray Each Nostril TID    bacitracin   Topical TID    furosemide  20 mg Intravenous BID    atorvastatin  10 mg Oral Nightly    fluticasone  1 puff Inhalation BID    sodium chloride flush  10 mL Intravenous 2 times per day    metoprolol tartrate  25 mg Oral BID    methylPREDNISolone  40 mg Intravenous Daily    guaiFENesin  600 mg Oral BID    warfarin (COUMADIN) daily dosing (placeholder)   Other RX Placeholder     Continuous Infusions:   nitroGLYCERIN 100 mcg/min (09/18/20 0747)     CBC:   Recent Labs     09/16/20  0451  09/17/20  0357  09/17/20  2316 09/18/20  0356 09/18/20  0942   WBC 3.3*  --  3.6  --   --  5.6  --    HGB 8.4*   < > 7.9*   < > 10.0* 9.4* 9.7*   *  --  112*  --   --  See Reflexed IPF Result  --     < > = values in this interval not displayed.      BMP: Recent Labs     09/16/20 0451 09/16/20 2129 09/17/20 0357 09/18/20 0356     --  138 139   K 4.2 4.0 4.0 4.0     --  101 102   CO2 26  --  26 25   BUN 39*  --  47* 50*   CREATININE 1.50*  --  1.83* 1.67*   GLUCOSE 93  --  125* 102*     Hepatic:No results for input(s): AST, ALT, ALB, BILITOT, ALKPHOS in the last 72 hours. Troponin:   Recent Labs     09/17/20  2316 09/18/20 0356 09/18/20  0942   TROPHS 88* 83* 80*     BNP: No results for input(s): BNP in the last 72 hours. Lipids: No results for input(s): CHOL, HDL in the last 72 hours. Invalid input(s): LDLCALCU  INR:   Recent Labs     09/16/20 0451 09/17/20 0357 09/18/20 0356   INR 3.7 4.0 2.4       Objective:   Vitals: BP (!) 178/77   Pulse 67   Temp 97.5 °F (36.4 °C) (Oral)   Resp (!) 31   Ht 5' (1.524 m)   Wt 136 lb 14.5 oz (62.1 kg)   LMP  (LMP Unknown)   SpO2 100%   BMI 26.74 kg/m²   General appearance: alert and cooperative with exam  HEENT: Head: Normocephalic, no lesions, without obvious abnormality. Neck:no JVD, trachea midline, no adenopathy  Lungs: fine wheezes  throughout anteriorly, fine rales noted LLL, diminished RLL and throughout. On NC oxygen without distress  Heart: Irregular rate and rhythm, s1/s2 auscultated, + murmurs  Afib on tele Rate controlled. Abdomen: soft, non-tender, bowel sounds active  Extremities: no edema, generalized ecchymosis and frail skin. Neurologic: not done    ECHO 8/12/2020: Post TAVR echo. Good TAVR position with no significant abnormalities and improved mean gradient to minimal. Normal LVSf, mild MR/TR, PAP 45 mmHg.      TAVR 8/11/2020: Done by Dr. Aaron Peña.      CATH 10/14/19: Single vessel significant CAD, mid LAD 90%, required PTCA/ANA. Minimal disease in RCA and LCx. EF 50%. Severe AS with mean gradient 34 mmHg and valve area 0.7 cm2. Abdominal aorta gram and iliac arteries, suggest no serious stenosis.      Previous office/hospital visit:   Dr. Aaron Peña 7/10/2020:   1.  CAD with PTCA -ANA LAD in Sept 2019  2. PVD - Venous insufficiency. 3. Carotid disease - Asymptomatic  4. Severe aortic stenosis with mean gradient 34 mmHg and valve area 0.7 cm2  5. H/O Atrial fibrillation, converted to NSR  6. Smoker / COPD  7. Recent cellulitis left leg 1/2020  8. Anemia due to rectal bleeding Jan 2020 / Post transfusion    Assessment / Acute Cardiac Problems:   1. Anemia in the setting of supratherapuetic INR (INR on admission 5.0; Hb 6.4) s/p 3 units packed RBC's  2. Supratherapuetic INR (5.0 on admission) on coumadin   3. Epistaxis  4. Melena likely d/t ingestion of blood from epistaxis  5. Severe AS s/p TAVR  6. Paroxysmal Atrial Fibrillation on coumadin  7. CAD s/p PTCA-ANA to LAD (September 2019)  8. Smoker  9. COPD      Patient Active Problem List:     Peripheral vascular disease ([address]     Dermatophytosis of nail     Corns and callosities     Peripheral venous insufficiency     HT (hammer toe)     Carotid artery stenosis     Swelling of left lower extremity     Cellulitis of left lower limb     History of angioplasty     Aortic stenosis, severe     COPD (chronic obstructive pulmonary disease) ([address]     Presence of stent in coronary artery in patient with coronary artery disease     Smoker unmotivated to quit     A-fib ([address]     Anticoagulated     Urinary retention     Atonic bladder     Hypertension     Thrombocytopenia (HCC)     Stage 3 chronic kidney disease (HCC)     Kyphosis     Coronary artery disease involving native coronary artery without angina pectoris     Other specified anemias     TIFFANIE (acute kidney injury) ([address]     Elevated brain natriuretic peptide (BNP) level      Plan of Treatment:   1. Anemia - stable. INR today 2.4  Long discussion with patient and son regarding AC options. She has hx of Afib and has always been on coumadin managed by PCP but she does not know why they have never tried the NOAC. Reviewed NOAC with patient and son and they would really like this vs the Coumadin.  Will recheck INR tomorrow and if <=2 will consider NOAC instead of Coumadin  2. Severe AS s/p TAVR. Will resume Plavix today. 3. HTN - on NTG gtt for BP. Wean drip off. Will resume home Losartan (creat at baseline) and increase PO BB.   4. PAF rate controlled. Coumadin on hold d/t supratheraputic INR and GI bleed. Continue BB. Consider NOAC rather than Coumadin tomorrow when INR <=2  5. CAD  S/p PTCA 2019  Continue statin, BB. Resume ARB & Plavix  6. Acute CHF - improving. Continue BB & restart ARB. Continue IV Lasix today and switch to PO tomorrow.        Electronically signed by MASON Dimas CNP on 9/18/2020 at 12:16 PM  [address]  [phone]

## 2020-09-18 NOTE — PROGRESS NOTES
Pharmacy Note  Warfarin Consult follow-up      Recent Labs     09/18/20  0356   INR 2.4     Recent Labs     09/16/20  0451  09/17/20  0357  09/17/20  1632 09/17/20  2316 09/18/20  0356   HGB 8.4*   < > 7.9*   < > 9.6* 10.0* 9.4*   HCT 27.8*   < > 25.8*   < > 29.3* 30.6* 31.7*   *  --  112*  --   --   --  See Reflexed IPF Result    < > = values in this interval not displayed. Significant Drug-Drug Interactions:  New warfarin drug-drug interactions: None  Discontinued drug-drug interactions: None    Current warfarin drug-drug interactions:  acetaminophen, methylprednisolone    Date INR Dose   9/15/2020 5.0 None - given PRBC   9/16/2020 3.7 HOLD   9/17/2020 4.0 Hold - given PRBC   9/18/2020 2.4 Hold       Notes:  Call made to nurse on floor who was with internal medicine provider, verbalized they are planning to HOLD warfarin until INR drops below 2 but will verify with cardiology. Will HOLD warfarin today. Daily PT/INR while inpatient.      Greyson Proctor, PharmD  PGY-2 Ambulatory Care Resident Pharmacist  9/18/2020  11:33 AM

## 2020-09-18 NOTE — PLAN OF CARE
atelectasis  Basilar aeration []  Atelectasis or absent basilar breath sounds   Incentive Spirometry Volume  (Per IBW)   []  Greater than or equal to 15ml/Kg []  less than 15ml/Kg []  less than 15ml/Kg   Surgery within last 2 weeks []  None or general   []  Abdominal or thoracic surgery  []  Abdominal or thoracic   Chronic Pulmonary Historyre []  No []  Yes []  Yes     []  Secretion Management Assessment  Score 1 2 3   Bilateral Breath Sounds   []  Occasional Rhonchi []  Scattered Rhonchi []  Course Rhonchi and/or poor aeration   Sputum    []  Small amount of thin secretions []  Moderate amount of viscous secretions []  Copius, Viscious Yellow/ Secretions   CXR as reported by physician []  clear  []  Unavailable []  Infiltrates and/or consolidation  []  Unavailable []  Mucus Plugging and or lobar consolidation  []  Unavailable   Cough []  Strong, productive cough []  Weak productive cough []  No cough or weak non-productive cough   STEVE MARTINEZ  10:07 AM                            FEMALE                                  MALE                            FEV1 Predicted Normal Values                        FEV1 Predicted Normal Values          Age                                     Height in Feet and Inches       Age                                     Height in Feet and Inches       4' 11\" 5' 1\" 5' 3\" 5' 5\" 5' 7\" 5' 9\" 5' 11\" 6' 1\"  4' 11\" 5' 1\" 5' 3\" 5' 5\" 5' 7\" 5' 9\" 5' 11\" 6' 1\"   42 - 45 2.49 2.66 2.84 3.03 3.22 3.42 3.62 3.83 42 - 45 2.82 3.03 3.26 3.49 3.72 3.96 4.22 4.47   46 - 49 2.40 2.57 2.76 2.94 3.14 3.33 3.54 3.75 46 - 49 2.70 2.92 3.14 3.37 3.61 3.85 4.10 4.36   50 - 53 2.31 2.48 2.66 2.85 3.04 3.24 3.45 3.66 50 - 53 2.58 2.80 3.02 3.25 3.49 3.73 3.98 4.24   54 - 57 2.21 2.38 2.57 2.75 2.95 3.14 3.35 3.56 54 - 57 2.46 2.67 2.89 3.12 3.36 3.60 3.85 4.11   58 - 61 2.10 2.28 2.46 2.65 2.84 3.04 3.24 3.45 58 - 61 2.32 2.54 2.76 2.99 3.23 3.47 3.72 3.98   62 - 65 1.99 2.17 2.35 2.54 2.73 2.93 3.13 3.34 62 -

## 2020-09-18 NOTE — CONSULTS
Palliative Care Inpatient Consult    NAME:  [address] RECORD NUMBER:  [id number]  AGE: 67 y.o. GENDER: female  : 1940  TODAY'S DATE:  2020    Reasons for Consultation:    Symptom and/or pain management  Provision of information regarding PC and/or hospice philosophies  Complex, time-intensive communication and interdisciplinary psychosocial support  Clarification of goals of care and/or assistance with difficult decision-making  Guidance in regards to resources and transition(s)    Members of PC team contributing to this consultation are :  Kimmy Romero CNP   Palliative Care   History of Present Illness     The patient is a 67 y.o. Non-/non  female who presents with Shortness of Breath (SOB with BLE swelling. H/o COPD/CHF/A.fib. Denies chest pain, cough. Afebrile)      Referred to Palliative Care by   [x] Physician   [] Nursing  [] Family Request   [] Other:       She was admitted to the Primary service for Anemia [D64.9]. Her hospital course has been associated with anemia, CHF, shingles, right knee effusion and tap. The patient has a complicated medical history and has been hospitalized since 9/15/2020  8:42 Cece Morgan is a 67year old whom came to hospital with complaints of increased SOB and BLE swelling. Patient was admitted with CHF, anemia. She has a medical history of vitamin D deficiency, incontinence, sinusitis, PVD, oxygen dependence, osteoarthritis, MI, weight loss, hypertension, hyperlipidemia, smoker, COPD, CKD, left breast cancer, anemia, and shingles. Patient's hemoglobin on admission was 6.4 and is currently 9.4 after infusions. Patient's labs today are troponin of 83, PT 24.3 INR 2.4 WBCs 5.6 RBCs 3.17 hemoglobin 9.4 hematocrit 31.7 BUN 50 and creatinine of 1.67.   Patient had the following consults during her hospital stay; otolaryngology due to epistaxis and Coumadin use palliative care for CODE STATUS discussion, Ortho for right knee effusion and tap, GI for melena and blood loss anemia, and cardiology for status post TAVR. Palliative care will continue to follow by patient and offer support as needed. Patient plans to go to therapy to get stronger and then to return home with her son.     Active Hospital Problems    Diagnosis Date Noted    TIFFANIE (acute kidney injury) ([address] [N17.9]     Elevated brain natriuretic peptide (BNP) level [R79.89]     Anemia [D64.9] 09/15/2020       PAST MEDICAL HISTORY      Diagnosis Date    Acute on chronic respiratory failure ([address]     TIFFANIE (acute kidney injury) ([address]     Anemia     Anticoagulated     Aortic valve disease 01/20/2020    dr Shon Lott     or 3/20/20     Asthma     Atonic bladder     Atrial fibrillation (HCC)     Backache, unspecified     Blood in stool     Breast cancer, left ([address] pt unsure of year, she was in her 63's    left    Cachexia ([address]     Chronic venous insufficiency     CKD (chronic kidney disease)     [facility]     Stage 3    Coronary atherosclerosis of unspecified type of vessel, native or graft     dr Kymberly Smith Current smoker     Dry skin     itching    Edema     Nevarez catheter present     Full dentures     History of blood transfusion 01/20/2020    2 units blood rectal bleeding    Hyperlipidemia     Hypertension     Impaired mobility     cane    Kyphosis     Loss of weight     MI (myocardial infarction) ([address]     x2    Osteoarthritis     Oxygen dependent     DME for oxygen at 2 lpm atc    Poor appetite     PVD [facility]peripheral vascular disease) [facility]     Superficial thrombosis of leg     Unspecified sinusitis (chronic)     Unspecified urinary incontinence     Unspecified vitamin D deficiency     Wears glasses        PAST SURGICAL HISTORY  Past Surgical History:   Procedure Laterality Date    APPENDECTOMY      CARDIAC CATHETERIZATION  10/14/2019    COLONOSCOPY      EYE SURGERY Bilateral 12/2015    cataracts/Jeramie Senior    HYSTERECTOMY, VAGINAL  8/13/1974    JOINT REPLACEMENT Right     Knee    MASTECTOMY Left     pt unsure of year, she was in her 64's     SHOULDER ARTHROPLASTY Left 2016    SIGMOIDOSCOPY N/A 2/27/2020    SIGMOIDOSCOPY DIAGNOSTIC FLEXIBLE performed by Irina Pineda IV, DO at [facility]       SOCIAL HISTORY  Social History     Tobacco Use    Smoking status: Current Every Day Smoker     Packs/day: 1.00     Years: 47.00     Pack years: 47.00     Types: Cigarettes    Smokeless tobacco: Never Used    Tobacco comment: now 1/2 ppd; prev 1 pack   Substance Use Topics    Alcohol use: No     Alcohol/week: 0.0 standard drinks    Drug use: No       ALLERGIES  No Known Allergies      MEDICATIONS  Current Medications    ipratropium-albuterol  1 ampule Inhalation 4x daily    [START ON 9/19/2020] albuterol  2.5 mg Nebulization BID    pantoprazole  40 mg Intravenous Daily    And    sodium chloride (PF)  10 mL Intravenous Daily    amLODIPine  10 mg Oral Daily    sodium chloride  2 spray Each Nostril TID    bacitracin   Topical TID    furosemide  20 mg Intravenous BID    atorvastatin  10 mg Oral Nightly    fluticasone  1 puff Inhalation BID    sodium chloride flush  10 mL Intravenous 2 times per day    metoprolol tartrate  25 mg Oral BID    methylPREDNISolone  40 mg Intravenous Daily    guaiFENesin  600 mg Oral BID    warfarin (COUMADIN) daily dosing (placeholder)   Other RX Placeholder     labetalol, morphine **OR** morphine, sodium chloride flush, acetaminophen **OR** acetaminophen, magnesium hydroxide, promethazine **OR** ondansetron, albuterol  IV Drips/Infusions   nitroGLYCERIN 100 mcg/min (09/18/20 0747)     Home Medications  No current facility-administered medications on file prior to encounter. Current Outpatient Medications on File Prior to Encounter   Medication Sig Dispense Refill    valACYclovir (VALTREX) 1 g tablet Take 1,000 mg by mouth 3 times daily Last dose on 9/14.  Three more days of cycle left per son.  celecoxib (CELEBREX) 200 MG capsule Take 200 mg by mouth daily       losartan (COZAAR) 50 MG tablet Take 50 mg by mouth daily       furosemide (LASIX) 80 MG tablet Take 80 mg by mouth 2 times daily      umeclidinium-vilanterol (ANORO ELLIPTA) 62.5-25 MCG/INH AEPB inhaler Inhale 1 puff into the lungs daily 1 each 3    warfarin (COUMADIN) 1 MG tablet Take 1 mg by mouth daily monday      hydrochlorothiazide (MICROZIDE) 12.5 MG capsule Take 12.5 mg by mouth daily      metoprolol tartrate (LOPRESSOR) 25 MG tablet Take 0.5 tablets by mouth 2 times daily 60 tablet 3    clopidogrel (PLAVIX) 75 MG tablet Take 1 tablet by mouth daily 30 tablet 3    albuterol (ACCUNEB) 1.25 MG/3ML nebulizer solution Inhale 1 ampule into the lungs every 6 hours as needed for Wheezing      FLOVENT  MCG/ACT inhaler Inhale 2 puffs into the lungs 2 times daily       PROVENTIL  (90 BASE) MCG/ACT inhaler Inhale 1 puff into the lungs 4 times daily       atorvastatin (LIPITOR) 10 MG tablet Take 10 mg by mouth daily Take 1/2 tablet daily.  warfarin (COUMADIN) 4 MG tablet Take 2 mg by mouth daily [id number]-[id number]-Friday-sat-sun      montelukast (SINGULAIR) 10 MG tablet Take 10 mg by mouth nightly.  Acetaminophen (TYLENOL PO) Take  by mouth as needed.       cephALEXin (KEFLEX) 500 MG capsule Take 1 capsule by mouth 2 times daily 10 capsule 0       Data         BP (!) 178/77   Pulse 67   Temp 97.5 °F (36.4 °C) (Oral)   Resp (!) 31   Ht 5' (1.524 m)   Wt 136 lb 14.5 oz (62.1 kg)   LMP  (LMP Unknown)   SpO2 100%   BMI 26.74 kg/m²     Wt Readings from Last 3 Encounters:   09/16/20 136 lb 14.5 oz (62.1 kg)   08/13/20 121 lb 7.6 oz (55.1 kg)   06/03/20 98 lb (44.5 kg)        Code Status: DNR-CCA     ADVANCED CARE PLANNING:  Patient has capacity for medical decisions: yes  Health Care Power of : no  Living Will: no     Personal, Social, and Family History  Marital Status: full/confusion   ___30%  Bed Bound; Extensive disease; Total care; intake reduced; LOCfull/confusion  ___20%  Bed Bound; Extensive disease; Total care; intake minimal; Drowsy/coma  ___10%  Bed Bound; Extensive disease; Total care; Mouth care only; Drowsy/coma  ___0       Death      Plan      Palliative Interaction:  Spoke with patient and son Nolvia Hinds and introduced myself and my palliative care role. Palliative care was consulted to discuss Code status. Patient was found sitting up in her room without any distress. Advance Care Planning     Advance Care Planning (ACP) Physician/NP/PA (Provider) Conversation      Date of ACP Conversation: 9/15/2020    Conversation Conducted with:   Patient with [address] [place] [address] Street:  Patient is a  and has 4 children, 3 sons Hardeep Leahy, and Nolvia Hinds and 1 daughter Norm Heart. Patient does not have a DPOA so the majority of her 4 children will be her decision maker. I discussed the three different code status and explained each on completely and patient states she would want to be a DNRCC-A no intubation. Paperwork completed and witness by RN and order placed. Patient son Nolvia Hinds notified of patient decision. Current Designated Health Care Decision Maker:      Note: Assess and validate information in current ACP documents, as indicated. If no Authorized Decision Maker has previously been identified, then patient chooses [place]:  \"Who would you like to name as your primary health care decision-maker? \"             Name: Rick Warner       Relationship: son         Phone number: [phone]  Kathryn Solis this person be reached easily? \" Yes  \"Who would you like to name as your back-up decision maker? \"   Name: Bill Esqueda        Relationship: Daughter        Phone number: [phone]  \"Can this person be reached easily? \" Yes    Note: If the relationship of these Decision-Makers to the patient does NOT follow your state's Next of Kin EHR.    Conversation Outcomes / Follow-Up Plan:   Patient's CODE STATUS was changed to DNR CC-A per her request, and son Sascha Che was notified of patient decision. Attempted to discuss hospice with patient, and she states she is not interested in that. She states she is going to go to rehab at [facility] and then go back home with her son Sascha Che when she is stronger. I updated Prashant Pennington 1681 discharge planner of our discussion. Length of Voluntary ACP Conversation in minutes:  60 minutes     Patty Mccurdy        Education/support to family  Education/support to patient  Discharge planning/helping to coordinate care  Communications with primary service  Pharmacologic pain management  Discussing meaning/purpose   Caregiver support/education  Continue with current plan of care  Code status clarified: DNRCCA  Principle Problem/Diagnosis:  Anemia   CHF    Additional Assessments:   Active Problems:    Anemia    TIFFANIE (acute kidney injury) [facility]    Elevated brain natriuretic peptide (BNP) level  Vitamin D deficiency  Incontinence  Sinusitis  PVD  Oxygen dependence  Osteoarthritis  MI  Weight loss  Hypertension  Hyperlipidemia  Smoker  COPD  CKD  Left breast cancer  Anemia  Shingles  1- Symptom management/ pain control     Pain Assessment:  Pain is controlled with current analgesics. Medication(s) being used: acetaminophen, narcotic analgesics including Morphine .                Anxiety:  none                          Dyspnea:  Shortness of breath oxygen per nasal cannula                          Fatigue:  Generalized weakness    Other:      2- Goals of care evaluation   The patient goals of care are improve or maintain function/quality of life   Goals of care discussed with:    [] Patient independently    [x] Patient and Family    [] Family or Healthcare DPOA independently    [] Unable to discuss with patient, family/DPOA not present    3- Code Status  DNR-CCA    4- Other recommendations   - We will continue to provide comfort and support to the patient and the family    Palliative Care will continue to follow Ms. Vargas's care as needed. Thank you for allowing Palliative Care to participate in the care of Ms. Cristobal Witt . This note has been dictated by dragon, typing errors may be a possibility. The total time I spent in seeing the patient, discussing goals of care, advanced directives, code status and other major issues was more than 60 minutes      Electronically signed by   MASON Villa NP  Palliative Care Team  on 9/18/2020 at 11:06 AM    Please call with any palliative questions or concerns. Palliative Care Team is available via perfect serve or via phone.

## 2020-09-18 NOTE — PROGRESS NOTES
THE [facility] [place] Progress Note    Sima Reyes is a 67 y.o. female patient. Hospitalization Day:3      Chief consult reason: Melena, acute blood loss anemia      Subjective: Patient seen and examined. Sitting up in chair today at bedside on N/C. Overall much improved. Patient reports having an episode of \"brain freeze\" type of chest pain last evening which has resolved. No further episodes of melena or blood in stool       VITALS:  BP (!) 157/69   Pulse 67   Temp 97.5 °F (36.4 °C) (Oral)   Resp 19   Ht 5' (1.524 m)   Wt 136 lb 14.5 oz (62.1 kg)   LMP  (LMP Unknown)   SpO2 100%   BMI 26.74 kg/m²   TEMPERATURE:  Current - Temp: 97.5 °F (36.4 °C); Max - Temp  Av.7 °F (36.5 °C)  Min: 97.5 °F (36.4 °C)  Max: 98.1 °F (36.7 °C)    Physical Assessment:  General appearance:  alert, cooperative and no distress  Mental Status:  oriented to person, place and time and normal affect  Lungs:  Decreased aeration bilaterally, normal effort, on supplemental oxygen per N/C  Heart:  regular rate and rhythm, no murmur  Abdomen:  soft, nontender, nondistended, + bowel sounds  Extremities:  Trace LE  edema, no redness, No clubbing  Skin:  warm, dry, no gross lesions or rashes    Data Review:  LABS and IMAGING:     CBC  Recent Labs     09/15/20  0910  20  0451  20  0357 20  1153 20  1632 20  2316 20  0356   WBC 3.3*  --  3.3*  --  3.6  --   --   --  5.6   HGB 6.4*   < > 8.4*   < > 7.9* 9.7* 9.6* 10.0* 9.4*   HCT 21.9*   < > 27.8*   < > 25.8* 30.9* 29.3* 30.6* 31.7*   .0  --  96.2  --  96.6  --   --   --  100.0   MCHC 29.2  --  30.2  --  30.6  --   --   --  29.7   RDW 18.4*  --  17.7*  --  18.3*  --   --   --  17.7*     --  113*  --  112*  --   --   --  See Reflexed IPF Result    < > = values in this interval not displayed.        Immature PLTs   Lab Results   Component Value Date    PLTFLUORE 114 2020       ANEMIA STUDIES  No results for input(s): QUIRINO TIBC, IRON, FERRITIN, [id number], FOLATE, OCCULTBLD in the last 72 hours. BMP  Recent Labs     09/16/20  0451 09/16/20 2129 09/17/20 0357 09/18/20 0356     --  138 139   K 4.2 4.0 4.0 4.0     --  101 102   CO2 26  --  26 25   BUN 39*  --  47* 50*   CREATININE 1.50*  --  1.83* 1.67*   GLUCOSE 93  --  125* 102*   CALCIUM 8.0*  --  7.9* 7.8*       LFTS  No results for input(s): ALKPHOS, ALT, AST, BILITOT, BILIDIR, LABALBU in the last 72 hours. AMYLASE/LIPASE/AMMONIA  No results for input(s): AMYLASE, LIPASE, AMMONIA in the last 72 hours. Acute Hepatitis Panel   No results found for: HEPBSAG, HEPCAB, HEPBIGM, HEPAIGM    HCV Genotype   No results found for: HEPATITISCGENOTYPE    HCV Quantitative   No results found for: HCVQNT    LIVER WORK UP:    AFP  No results found for: AFP    Alpha 1 antitrypsin   No results found for: A1A    Anti - Liver/Kidney Ab  No results found for: LIVER-KIDNEYMICROSOMALAB    JONES  No results found for: JONES    AMA  No results found for: MITOAB    ASMA  No results found for: SMOOTHMUSCAB    Ceruloplasmin  No results found for: CERULOPLSM    Celiac panel  No results found for: TISSTRNTIIGG, TTGIGA, IGA    IgG  No results found for: IGG    IgM  No results found for: IGM    GGT   No results found for: LABGGT    PT/INR  Recent Labs     09/16/20  0451 09/17/20 0357 09/18/20 0356   PROTIME 37.2* 39.6* 24.3*   INR 3.7 4.0 2.4       Cancer Markers:  CEA:  No results for input(s): CEA in the last 72 hours. Ca 125:  No results for input(s):  in the last 72 hours. Ca 19-9:   No results for input(s):  in the last 72 hours. AFP: No results for input(s): AFP in the last 72 hours. Lactic acid:No results for input(s): LACTACIDWB in the last 72 hours.     Radiology Review:    Xr Chest (2 Vw)    Result Date: 9/15/2020  EXAMINATION: TWO XRAY VIEWS OF THE CHEST 9/15/2020 9:37 am COMPARISON: 08/13/2020 HISTORY: ORDERING SYSTEM PROVIDED HISTORY: macho TECHNOLOGIST PROVIDED HISTORY: rhonchi Reason for Exam: rhonchi Acuity: Unknown Type of Exam: Unknown FINDINGS: Examination is limited by low lung volumes and exaggerated kyphosis. Cardiomegaly and moderate-to-large bilateral pleural effusions. Adjacent bibasilar airspace opacities noted. Pulmonary vascular congestion. Osseous structures and soft tissues are grossly intact. Status post left shoulder arthroplasty. Cardiomegaly, pulmonary vascular congestion, and bibasilar airspace opacities. Moderate-sized bilateral pleural effusions. ENDOSCOPY     Active Problems:    Anemia    TIFFANIE (acute kidney injury) ([address]    Elevated brain natriuretic peptide (BNP) level  Resolved Problems:    * No resolved hospital problems. *       GI Assessment:    1. Acute blood loss anemia likely secondary to epistaxis in setting of iatrogenic coagulopathy (coumadin) with INR of 5.0.  2. Melena secondary to ingested blood from above   -Unlikely GI source of bleeding    3. ? Dysphagia - Staff reported patient had reports of swallowing difficulty. Swallow eval completed by speech therapy with recommendations for regular diet thin liquids okay. Patient hemodynamically stable from GI perspective. Streaks of blood noted on stool in past likely hemorrhoidal.  Melena secondary to epistaxis. Hemoglobin stable      Plan of care:  1. No indication for EGD at this time  2. PPI once daily  3. Start preparation H hemorrhoidal ointment twice daily x 10 days. 4. Patient to follow-up with Dr. Tamara Gore in [place] upon discharge  5. GI will sign off. Please feel free to contact me with any questions or concerns. Thank you for allowing me to participate in the care of your patient. MASON Crocker - CNP on 9/18/2020 at 7:08 [address] [place] [address] Gastroenterology    Please note that this note was generated using a voice recognition dictation software.   Although every effort was made to ensure the accuracy of this automated transcription, some errors in transcription may have occurred. Attending Physician Attestation  Patient seen and examined with Ms. Violeta Barba CNP. I have reviewed the above note and confirmed the key elements of the medical history and physical exam. I have discussed the findings, established the care plan and recommendations with Ms. Brown President and primary team.    Snehal Shelton MD  Gastroenterology

## 2020-09-18 NOTE — PROGRESS NOTES
[facility]  Internal Medicine Teaching Residency Program  Inpatient Daily Progress Note  ______________________________________________________________________________    Patient: Flakita Cruz  YOB: 1940   OJY:[id number]    Acct: [de-identified]     Room: 2003/2003-01  Admit date: 9/15/2020  Today's date: 09/18/20  Number of days in the hospital: 3    SUBJECTIVE   Admitting Diagnosis: Bleeding with supratherapeutic INR  CC: Epistaxis and melena while on warfarin  Pt examined at bedside. Chart & results reviewed. Resting comfortably in the chair  No further episodes of dyspnea  Still complaints of episodes of hematochezia  No other complaints overnight    BRIEF HISTORY     The patient is a pleasant 67 y.o. female with past history of atrial fibrillation on warfarin, recent TAVR for severe aortic stenosis presented to ED with multiple episodes of epistaxis and melena with supratherapeutic INR at 5. Patient is a poor historian and is unable to give good history. She reports that for the couple of days she is having profuse epistaxis with Melena. She denied chest pain, abdominal pain, hematemesis or hematochezia. She has chronic Nevarez's catheter placed and chronic bilateral lower extremity swelling with some redness.       In the ED, she was requiring 4 L of oxygen through nasal cannula and was breathing without any significant distress. Her blood pressure was elevated to 179/104 and was started on nitroglycerin drip by cardiology. She had acute on chronic anemia with hemoglobin of 6.4 requiring blood transfusion but her hemoglobin did not come up. Chest x-ray revealed bilateral pleural effusion unchanged from the previous one, along with pulmonary vascular congestion suggestive of fluid overload.   She received 1 dose of Lasix 40 mg in the ED.     When she was transferred to the floor, she started having significant respiratory distress and was requiring 6 L of oxygen through nasal cannula. She had wheezing all over her chest along with bilateral basilar crackles. She was given another dose of Lasix 40 mg along with breathing treatments leading to much improvement. She was given another PRBC transfusion with a goal of keeping her hemoglobin above 8.      Her past medical history is significant for:  -Severe aortic stenosis s/p TAVR 2020  -CAD s/p ANA LAD stenosis 10/2019  -Atrial fibrillation-on warfarin  -Dysphagia and rectal bleeding in the past>last EGD  but no record in the epic. 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 Last flex sig 2020> was unable to pass scope about 20 cm. Follow-up barium enema did not reveal any constricting lesion. OBJECTIVE     Vital Signs:  BP (!) 178/77   Pulse 67   Temp 97.5 °F (36.4 °C) (Oral)   Resp (!) 31   Ht 5' (1.524 m)   Wt 136 lb 14.5 oz (62.1 kg)   LMP  (LMP Unknown)   SpO2 100%   BMI 26.74 kg/m²     Temp (24hrs), Av.8 °F (36.6 °C), Min:97.5 °F (36.4 °C), Max:98.1 °F (36.7 °C)    In: 2974   Out: 525 [Urine:525]    Physical Exam:  Constitutional: This is a well developed, well nourished, 25-29.9 - Overweight 67y.o. year old female who is alert, oriented, cooperative and in no apparent distress. Head:normocephalic and atraumatic. EENT:  PERRLA. No conjunctival injections. Septum was midline, mucosa was without erythema, exudates or cobblestoning. No thrush was noted. Neck: Supple without thyromegaly. No elevated JVP. Trachea was midline. Respiratory: B/L coarse wheezing and basal crepts in chest  Cardiovascular: Irregular rhythm  Abdomen: Slightly rounded and soft without organomegaly. No rebound, rigidity or guarding was appreciated. Lymphatic: No lymphadenopathy. Musculoskeletal: Normal curvature of the spine. No gross muscle weakness. Extremities:  No lower extremity edema, ulcerations, tenderness, varicosities or erythema.   Muscle size, tone and strength are normal.  No involuntary movements are noted. Skin:  Warm and dry. Good color, turgor and pigmentation. No lesions or scars. No cyanosis or clubbing  Neurological/Psychiatric: The patient's general behavior, level of consciousness, thought content and emotional status is normal.        Medications:  Scheduled Medications:    pantoprazole  40 mg Intravenous Daily    And    sodium chloride (PF)  10 mL Intravenous Daily    amLODIPine  10 mg Oral Daily    ipratropium-albuterol  1 ampule Inhalation Q4H WA    sodium chloride  2 spray Each Nostril TID    bacitracin   Topical TID    furosemide  20 mg Intravenous BID    atorvastatin  10 mg Oral Nightly    fluticasone  1 puff Inhalation BID    sodium chloride flush  10 mL Intravenous 2 times per day    metoprolol tartrate  25 mg Oral BID    methylPREDNISolone  40 mg Intravenous Daily    guaiFENesin  600 mg Oral BID    warfarin (COUMADIN) daily dosing (placeholder)   Other RX Placeholder     Continuous Infusions:    nitroGLYCERIN 100 mcg/min (09/18/20 0747)     PRN Medicationslabetalol, 10 mg, Q2H PRN  morphine, 2 mg, Q2H PRN    Or  morphine, 4 mg, Q2H PRN  sodium chloride flush, 10 mL, PRN  acetaminophen, 650 mg, Q6H PRN    Or  acetaminophen, 650 mg, Q6H PRN  magnesium hydroxide, 30 mL, Daily PRN  promethazine, 12.5 mg, Q6H PRN    Or  ondansetron, 4 mg, Q6H PRN  albuterol, 10 mg, Q6H PRN  albuterol, 2.5 mg, As Directed RT PRN        Diagnostic Labs:  CBC:   Recent Labs     09/16/20  0451  09/17/20  0357  09/17/20  1632 09/17/20  2316 09/18/20  0356   WBC 3.3*  --  3.6  --   --   --  5.6   RBC 2.89*  --  2.67*  --   --   --  3.17*   HGB 8.4*   < > 7.9*   < > 9.6* 10.0* 9.4*   HCT 27.8*   < > 25.8*   < > 29.3* 30.6* 31.7*   MCV 96.2  --  96.6  --   --   --  100.0   RDW 17.7*  --  18.3*  --   --   --  17.7*   *  --  112*  --   --   --  See Reflexed IPF Result    < > = values in this interval not displayed.      BMP:   Recent Labs 09/16/20 0451 09/16/20 2129 09/17/20 0357 09/18/20 0356     --  138 139   K 4.2 4.0 4.0 4.0     --  101 102   CO2 26  --  26 25   BUN 39*  --  47* 50*   CREATININE 1.50*  --  1.83* 1.67*     BNP: No results for input(s): BNP in the last 72 hours. PT/INR:   Recent Labs     09/16/20 0451 09/17/20 0357 09/18/20 0356   PROTIME 37.2* 39.6* 24.3*   INR 3.7 4.0 2.4     APTT: No results for input(s): APTT in the last 72 hours. CARDIAC ENZYMES: No results for input(s): CKMB, CKMBINDEX, TROPONINI in the last 72 hours. Invalid input(s): CKTOTAL;3  FASTING LIPID PANEL:No results found for: CHOL, HDL, TRIG  LIVER PROFILE: No results for input(s): AST, ALT, ALB, BILIDIR, BILITOT, ALKPHOS in the last 72 hours. MICROBIOLOGY:   Lab Results   Component Value Date/Time    CULTURE PENDING 09/17/2020 07:53 PM       Imaging:    Xr Chest (2 Vw)    Result Date: 9/15/2020  Cardiomegaly, pulmonary vascular congestion, and bibasilar airspace opacities. Moderate-sized bilateral pleural effusions. ASSESSMENT & PLAN     IMPRESSION  This is a 67 y.o. female with past history of afibrillation on warfarin, recent TAVR for severe aortic stenosis presented with 2-day history of epistaxis and melena while on warfarin and found to have supratherapeutic INR at 5 along with anemia with hemoglobin at 6.4.     PLAN:     1. Acute exacerbation of congestive heart failure-Continue to be short of breath. On 80mg Lasix at home. Repeat CXR with vascular congestion suggestive of fluid overload. ON lasix 40mg twice daily. On Bipap with intermittent non-rebreathable mask. Echo to assess systolic function  2. Hypertensive urgency-admitting blood pressure of 178/77. Continue with nitroglycerin drip and taper off accordingly, start on Imdur, add hydralazine and wean off nitro  3. Acute on chronic anemia due to GI bleed-melena and epistaxis while on warfarin with admitting hemoglobin of 6.4 s/p 3 PRBC transfusion.  Goal Hb >8 to avoid demand ischemia. H&H monitoring every 6 hours. Protonix 40 mg IV once daily. GI not recommending any scope at this moment. 4. Epistasis while on warfarin-resolved. Nasal saline, bacitracin ointment and highly humidified oxygen as per ENT  5. Supratherapeutic INR while on warfarin-hold warfarin, Plavix and all other AC/AP. Repeat INR still 4. If patient continue to have blood in stools then will consider to reverse INR  6. COPD 2 L home oxygen-likely exacerbation. B/L coarse wheezing all over chest. Continue methylprednisone 40 mg daily and breathing treatment  7. Pain-less right knee swelling-knee x-ray suggestive of large effusion. 8. TAVR for severe aortic stenosis 12/8/2020. Post-TAVR echo revealed normal positioning of valve with normal ventricular function. 9. Atrial fibrillation-rate controled. Holding warfarin due to recent bleed and supra-therapeutic INR   10. CAD s/p ANA for 90% LAD stenosis-stable. Denies chest pain. Continue Lipitor and metoprolol. Holding aspirin and Plavix due to bleeding     DVT ppx: Holding anticoagulation due to bleeding  GI ppx: Protonix     PT/OT/SW: On board   Discharge Planning: On board    Mia Adair MD  Internal Medicine Resident, PGY-2  [facility];  [place], [place]  9/18/2020, 9:34 AM

## 2020-09-18 NOTE — PROGRESS NOTES
that includes Hysterectomy, vaginal (8/13/1974); Appendectomy; Mastectomy (Left); joint replacement (Right); eye surgery (Bilateral, 12/2015); Total shoulder arthroplasty (Left, 2016); Colonoscopy; Cardiac catheterization (10/14/2019); and sigmoidoscopy (N/A, 2/27/2020). Restrictions  Restrictions/Precautions  Restrictions/Precautions: General Precautions, Fall Risk, Cardiac, Up as Tolerated  Required Braces or Orthoses?: No  Position Activity Restriction  Other position/activity restrictions: O2 per nasal canula--8L  Subjective   General  Patient assessed for rehabilitation services?: Yes  Family / Caregiver Present: No  Diagnosis: pulmonary vascular congestion, B/L pleural effusions, BLE edema, hx. of TAVR  Vital Signs  Patient Currently in Pain: Denies   Orientation  Orientation  Overall Orientation Status: Within Functional Limits  Objective    ADL  Grooming: Setup; Increased time to complete;Contact guard assistance(Oral care/denture care seated in chair.)  UE Bathing: Setup; Increased time to complete;Minimal assistance(Wash face/hands seated in chair.)  Additional Comments: Pt declined ADL care this day d/t fatigue from working with PT and says \"I'm just ready to get out of here\". Pt completed simple grooming seated in chair. Pt educated on pursed lip breathing/EC/WS during functional activities with fair return.   Balance  Sitting Balance: Supervision(Seated in chair.)  Cognition  Overall Cognitive Status: Exceptions  Safety Judgement: Decreased awareness of need for assistance;Decreased awareness of need for safety  Plan   Plan  Times per week: 3-5x  Goals  Short term goals  Time Frame for Short term goals: Pt will by discharge  Short term goal 1: demo good safety awareness during func mob at bedside using LRD and mod A  Short term goal 2: demo CGA and increased time for all bed mob and transfers using LRD and railings PRN  Short term goal 3: demo ADL UB bathing/dressing activity with setup, increased time, and CGA  Short term goal 4: demo ADL LB bathing/dressing activity with setup, increased time, AE PRN, and min A  Short term goal 5: demo BUE strength of 5/5 grossly for use in ADL completion       Therapy Time   Individual Concurrent Group Co-treatment   Time In 0953         Time Out 1012         Minutes 19         Timed Code Treatment Minutes: 19 Minutes     Pt seated in chair upon therapist arrival. Pleasant and agreeable to limited therapy. See above for LOF for all tasks. Pt retired to seated in chair at end of session with call light within reach.     HUSSEIN Paez/TATIANA

## 2020-09-18 NOTE — PROGRESS NOTES
Writer contacted internal med and cardiology regarding pt states she is having a \"brain freeze\" in her chest. Writer asked if she meant that she was cold or if she was having chest pain and all she would describe it as was a brain freeze. Writer grabbed an EKG and it shows sinus rhythm with short NH with PACs, moderate voltage criteria for LVH, may be normal variant, cannot rule out septal infarct. Only difference with this EKG compared to 9/15 is short NH. Pt has nitro drip at 100mcg/min. /72 HR 61. No new orders from either team. Will continue to monitor.      Electronically signed by Dominga London RN on 9/18/2020 at 6:38 AM

## 2020-09-18 NOTE — PLAN OF CARE
Problem: Skin Integrity:  Goal: Will show no infection signs and symptoms  Description: Will show no infection signs and symptoms  Outcome: Ongoing  Goal: Absence of new skin breakdown  Description: Absence of new skin breakdown  Outcome: Ongoing     Problem: Falls - Risk of:  Goal: Will remain free from falls  Description: Will remain free from falls  Outcome: Ongoing  Goal: Absence of physical injury  Description: Absence of physical injury  Outcome: Ongoing     Problem: Bleeding:  Goal: Will show no signs and symptoms of excessive bleeding  Description: Will show no signs and symptoms of excessive bleeding  Outcome: Ongoing     Problem: Pain:  Goal: Pain level will decrease  Description: Pain level will decrease  Outcome: Ongoing  Goal: Control of acute pain  Description: Control of acute pain  Outcome: Ongoing  Goal: Control of chronic pain  Description: Control of chronic pain  Outcome: Ongoing     Problem: RESPIRATORY  Goal: Absence of respiratory distress  Description: Absence of respiratory distress  9/18/2020 1007 by Vivi Curry RCP  Outcome: Ongoing     Problem: Gas Exchange - Impaired:  Goal: Levels of oxygenation will improve  Description: Levels of oxygenation will improve  9/18/2020 1849 by Vel Moreno RN  Outcome: Ongoing  9/18/2020 1007 by Vivi Curry RCP  Outcome: Ongoing  Note:   PROVIDE ADEQUATE OXYGENATION WITH ACCEPTABLE SP02/ABG'S    [x]  IDENTIFY APPROPRIATE OXYGEN THERAPY  [x]   MONITOR SP02/ABG'S AS NEEDED   [x]   PATIENT EDUCATION AS NEEDED        Problem: Musculor/Skeletal Functional Status  Goal: Highest potential functional level  Outcome: Ongoing  Goal: Absence of falls  Outcome: Ongoing

## 2020-09-18 NOTE — PROGRESS NOTES
Physical Therapy    Facility/Department: [facility] CAR 2  Initial Assessment    NAME: Steve Dunaway  : 1940  MRN: [id number]    Date of Service: 2020  Admitted with LE edema, painful RLE and inability to ambulate. R knee aspirated 20. Discharge Recommendations:  Further therapy recommended at discharge. PT Equipment Recommendations  Equipment Needed: (TBD)    Assessment    Pt alert, cooperative but says she's \"tired of testing; no more tests\". She needed assistance getting OOB and up to the chair, but much improved from the OT eval of 20. She states her sons assist her prn and she's never alone. Frequent prolonged rest breaks. Body structures, Functions, Activity limitations: Decreased functional mobility ; Decreased ROM; Decreased strength;Decreased endurance;Decreased balance;Decreased posture  Prognosis: Fair  Decision Making: Medium Complexity  PT Education: Goals;PT Role;Plan of Care  Barriers to Learning: none  REQUIRES PT FOLLOW UP: Yes  Activity Tolerance  Activity Tolerance: Patient limited by endurance(pt SOB, but feels it's \"normal\")       Patient Diagnosis(es): The primary encounter diagnosis was Other specified anemias. A diagnosis of Elevated brain natriuretic peptide (BNP) level was also pertinent to this visit.      has a past medical history of Acute on chronic respiratory failure ([address] TIFFANIE (acute kidney injury) ([address] Anemia, Anticoagulated, Aortic valve disease, Asthma, Atonic bladder, Atrial fibrillation (HCC), Backache, unspecified, Blood in stool, Breast cancer, left ([address] Cachexia ([address] Chronic venous insufficiency, CKD (chronic kidney disease), COPD (chronic obstructive pulmonary disease) ([address] Coronary atherosclerosis of unspecified type of vessel, native or graft, Current smoker, Dry skin, Edema, Nevarez catheter present, Full dentures, History of blood transfusion, Hyperlipidemia, Hypertension, Impaired mobility, Kyphosis, Loss of weight, MI (myocardial Help From: Family  ADL Assistance: Independent  Homemaking Assistance: Needs assistance  Homemaking Responsibilities: Yes  Ambulation Assistance: Independent(recently her son was having to help her advance her RLE d/t pain/inability to move it herself)  Transfer Assistance: Independent  Active : No  Patient's  Info: Son  Mode of Transportation: Car  Additional Comments: Pt on 3.5L Home O2, sons provide pt with nearly 24 hour supervision, pt is a questionable historian at times however    Objective     Observation/Palpation  Posture: Poor    PROM RLE (degrees)  RLE PROM: WFL  PROM LLE (degrees)  LLE PROM: WFL  PROM RUE (degrees)  RUE PROM: WFL  PROM LUE (degrees)  LUE PROM: WFL  Strength RLE  Strength RLE: Exception--hip 2/5; knee distal 3/5  Strength LLE  Strength LLE: Exception--hip 2+/5; knee distal 3/5  Strength RUE  Strength RUE: Exception--shoulder 0/5; elbow distal 3/5  Strength LUE  Strength LUE: Exception--shoulder 1/5; elbow distal 3/5  Tone RLE  RLE Tone: Normotonic  Tone LLE  LLE Tone: Normotonic  Motor Control  Gross Motor?: WFL  Sensation  Overall Sensation Status: WFL  Bed mobility  Rolling to Right: Minimal assistance  Supine to Sit: Moderate assistance  Scooting: Moderate assistance  Transfers  Sit to Stand: Moderate Assistance  Stand to sit: Moderate Assistance  Bed to Chair: Minimal assistance  Stand Pivot Transfers: Minimal Assistance  Ambulation  Ambulation?: Yes  Ambulation 1  Surface: level tile  Device: Rolling Walker  Other Apparatus: O2  Assistance: Minimal assistance  Quality of Gait: pt is kyphotic  Gait Deviations: Slow Saray; Increased SHUN; Decreased step length;Decreased step height;Decreased arm swing;Decreased head and trunk rotation  Distance: 8 steps bed to chair; 10' in room  Stairs/Curb  Stairs?: No     Balance  Posture: Fair  Sitting - Static: Good  Sitting - Dynamic: Fair  Standing - Static: Fair  Standing - Dynamic: Fair;-  Other exercises  Other exercises 1: KAITLYN

## 2020-09-18 NOTE — PROGRESS NOTES
Writer contacted internal [facility] regarding pt BP. Pt has nitro running at 100mcg/min. BP has been running high but heart rate has been low 60s sometimes high 50s. BP is 156/76 HR 66. Parameters for PRN IV labetalol are SBP >150 and hold for HR <60. Writer instructed to hold off on administering any PRN IV lopressor due to lower heart rate. Will continue to monitor.     Electronically signed by Jose Armando Reeder RN on 9/18/2020 at 6:40 AM

## 2020-09-18 NOTE — CARE COORDINATION
Transition planning  Called and left VM for Shayne at [facility] to start pre-auth for SNF. Await return call. [phone] Received call from [place] at [facility] re: pre- auth for patient to go to SNF. Faxed clinicals, H&P and PT/OT notes to [phone]. PS Dr. Suzy Connolly for Covid test order. 1430 Received call from [place] at [facility], she states they are giving pre-auth for SNF through 9-22.  1450 Called and spoke with [facility] in [place], informed her MMO has approved pre-auth through 9-22. [facility] states she will start pre-cert today.

## 2020-09-19 NOTE — PROGRESS NOTES
BMP:    Recent Labs     09/17/20  0357 09/18/20  0356 09/19/20  0338    139 138   K 4.0 4.0 4.0    102 101   CO2 26 25 27   BUN 47* 50* 52*   CREATININE 1.83* 1.67* 1.57*   GLUCOSE 125* 102* 119*     Hepatic: No results for input(s): AST, ALT, ALB, BILITOT, ALKPHOS in the last 72 hours. Troponin: No results for input(s): TROPONINI in the last 72 hours. Recent Labs     09/18/20  2230 09/19/20  0338 09/19/20  0946   TROPONINT NOT REPORTED NOT REPORTED NOT REPORTED     BNP: No results for input(s): PROBNP in the last 72 hours. No results for input(s): BNP in the last 72 hours. Lipids: No results for input(s): CHOL, HDL in the last 72 hours. Invalid input(s): LDLCALCU  INR:   Recent Labs     09/17/20  0357 09/18/20  0356 09/19/20  0338   INR 4.0 2.4 1.8       Objective:   Vitals: BP (!) 156/69   Pulse 68   Temp 98.2 °F (36.8 °C) (Oral)   Resp 23   Ht 5' (1.524 m)   Wt 136 lb 14.5 oz (62.1 kg)   LMP  (LMP Unknown)   SpO2 99%   BMI 26.74 kg/m²    Last Recorded Weight:  [unfilled]    Constitutional and General Appearance:    alert, cooperative, no distress and appears stated age  Respiratory:  · No for increased work of breathing. · On auscultation: clear to auscultation bilaterally  Cardiovascular:  · The apical impulse is not displaced  · Heart tones are crisp and normal. Regular S1 and S2. No murmurs. Abdomen:   · No masses or tenderness  · Bowel sounds present  Extremities:  ·  No Cyanosis or Clubbing  ·  Lower extremity edema: No  ·  Skin: Warm and dry  Neurological:  · Alert and oriented.   · Moves all extremities well    Diagnostic Studies:     EKG:  Sinus rhythm with Premature ventricular complexes or Fusion complexes  Left ventricular hypertrophy with QRS widening and repolarization abnormality  Abnormal ECG  When compared with ECG of 11-AUG-2020 10:28,  Sinus rhythm has replaced Atrial fibrillation  Left bundle branch block is no longer Present  ECHO:   Post TAVR echocardiogram  Good TAVR position with no significant abnormalities and improved mean  gradient to minimal,  Normal LV systolic function  Mild mitral regurgitation (Improved from before)  Mild tricuspid regurgitation  Increased PAP to 45 mmHg  Stress Test: not obtained. Cardiac Angiography:  CATH 10/14/19: Single vessel significant CAD, mid LAD 90%, required PTCA/ANA. Minimal disease in RCA and LCx. EF 50%. Severe AS with mean gradient 34 mmHg and valve area 0.7 cm2. Abdominal aorta gram and iliac arteries, suggest no serious stenosis.       Assessment / Acute Cardiac Problems:   1. Anemia in the setting of supratherapuetic INR (INR on admission 5.0; Hb 6.4) s/p 3 units packed RBC's  2. Supratherapuetic INR (5.0 on admission) on coumadin. Now 1.8  3. Epistaxis  4. Melena likely d/t ingestion of blood from epistaxis  5. Severe AS s/p TAVR  6. Paroxysmal Atrial Fibrillation on coumadin  7. CAD s/p PTCA-ANA to LAD (September 2019)  8. Smoker  9. COPD      Plan of Treatment:   1. Will start on eliquis 2.5 BID  2. Continue plavix  3. Resume home BB medications  4. Weaned off nitroglycerin gtt  5. Continue BB & ARB  6. Switch to PO lasix  7. K>4, Mg>2        Shayna Stokes MD  Fellow, [address]        Attending Physician Statement  I have discussed the care of Lorenzo Powers, including pertinent history and exam findings,  with the cardiology fellow/resident. I have seen and examined the patient and the key elements of all parts of the encounter have been performed by me. I agree with the assessment, plan and orders as documented by the resident with additional recommendations as below:         Long discussion with patient and her son. They do not want to take coumadin. Will start eliquis 2.5 mg bid ( dose adjusted due to weight and renal insuff). Risks and benefits of DOAC were explained and they are willing to proceed. clinically she is looking much better.  Hb is

## 2020-09-19 NOTE — PLAN OF CARE
improve  9/18/2020 2316 by Nish Rice RN  Outcome: Ongoing  9/18/2020 1849 by Kathy Baptiste RN  Outcome: Ongoing  9/18/2020 1007 by Regi Mars RCP  Outcome: Ongoing  Note:   PROVIDE ADEQUATE OXYGENATION WITH ACCEPTABLE SP02/ABG'S    [x]  IDENTIFY APPROPRIATE OXYGEN THERAPY  [x]   MONITOR SP02/ABG'S AS NEEDED   [x]   PATIENT EDUCATION AS NEEDED        Problem: Musculor/Skeletal Functional Status  Goal: Highest potential functional level  9/18/2020 2316 by Nish Rice RN  Outcome: Ongoing  9/18/2020 1849 by Kathy Baptiste RN  Outcome: Ongoing  Goal: Absence of falls  9/18/2020 2316 by Nish Rice RN  Outcome: Ongoing  9/18/2020 1849 by Kathy Baptiste RN  Outcome: Ongoing

## 2020-09-19 NOTE — PROGRESS NOTES
(SINGULAIR) 10 MG tablet Take 10 mg by mouth nightly. Yes Historical Provider, MD   Acetaminophen (TYLENOL PO) Take  by mouth as needed. Yes Historical Provider, MD   cephALEXin (KEFLEX) 500 MG capsule Take 1 capsule by mouth 2 times daily 8/13/20   Tremayne Vargas MD   .  Recent Surgical History: None = 0     Assessment clear and diminished. Patient keeps stating she doesn't feel she is getting enough oxygen. Weak congested cough.         RR 20     Breath Sounds: Clear upper lobes diminished throughout middle and lower lobes      · Bronchodilator assessment at level  3  · Hyperinflation assessment at level   · Secretion Management assessment at level    ·   · [x]    Bronchodilator Assessment  BRONCHODILATOR ASSESSMENT SCORE  Score 0 1 2 3 4 5   Breath Sounds   []  Patient Baseline []  No Wheeze good aeration []  Faint, scattered wheezing, good aeration [x]  Expiratory Wheezing and or moderately diminished []  Insp/Exp wheeze and/or very diminished []  Insp/Exp and/ or marked distress   Respiratory Rate   []  Patient Baseline []  Less than 20 []  Less than 20 [x]  20-25 []  Greater than 25 []  Greater than 25   Peak flow % of Pred or PB [x]  NA   []  Greater than 90%  []  81-90% []  71-80% []  Less than or equal to 70%  or unable to perform []  Unable due to Respiratory Distress   Dyspnea re []  Patient Baseline []  No SOB []  No SOB [x]  SOB on exertion []  SOB min activity []  At rest/acute   e FEV% Predicted       [x]  NA []  Above 69%  []  Unable []  Above 60-69%  []  Unable []  Above 50-59%  []  Unable []  Above 35-49%  []  Unable []  Less than 35%  []  Unable

## 2020-09-19 NOTE — PROGRESS NOTES
No orthopedic intervention planned for R TKA. Aspirate showing no growth to date. At this time we recommend patient follow up with her original Orthopedic surgeon in [facility] outpatient following discharge. Please page ortho with questions or concerns. Zay Luque DO  Orthopedic Surgery Resident, PGY-3  [facility]

## 2020-09-19 NOTE — PROGRESS NOTES
[facility]  Internal Medicine Teaching Residency Program  Inpatient Daily Progress Note  ______________________________________________________________________________    Patient: Alicia Crouch  YOB: 1940   VJT:[id number]    Acct: [de-identified]     Room: 2003/2003-01  Admit date: 9/15/2020  Today's date: 09/19/20  Number of days in the hospital: 4    SUBJECTIVE   Admitting Diagnosis: Bleeding with supratherapeutic INR  CC: Epistaxis and melena while on warfarin  Pt examined at bedside. Chart & results reviewed. No acute overnight events. She is currently on 5L o2 NC, maintaining saturation and breathing comfortably. She looks much better today, c/o feeling bit nauseated. Denies chest pain, vomiting, diarrhea or fever. Mild wheezing on chest examination. Vitals: /74, HR 68/min, afebrile  Labs: Leukopenia, INR 1.8    BRIEF HISTORY     The patient is a pleasant 67 y.o. female with past history of atrial fibrillation on warfarin, recent TAVR for severe aortic stenosis presented to ED with multiple episodes of epistaxis and melena with supratherapeutic INR at 5. Patient is a poor historian and is unable to give good history. She reports that for the couple of days she is having profuse epistaxis with Melena. She denied chest pain, abdominal pain, hematemesis or hematochezia. She has chronic Nevarez's catheter placed and chronic bilateral lower extremity swelling with some redness.       In the ED, she was requiring 4 L of oxygen through nasal cannula and was breathing without any significant distress. Her blood pressure was elevated to 179/104 and was started on nitroglycerin drip by cardiology. She had acute on chronic anemia with hemoglobin of 6.4 requiring blood transfusion but her hemoglobin did not come up.  Chest x-ray revealed bilateral pleural effusion unchanged from the previous one, along with pulmonary vascular congestion suggestive of fluid Normal curvature of the spine. No gross muscle weakness. Extremities:  No lower extremity edema, ulcerations, tenderness, varicosities or erythema. Muscle size, tone and strength are normal.  No involuntary movements are noted. Skin:  Warm and dry. Good color, turgor and pigmentation. No lesions or scars.   No cyanosis or clubbing  Neurological/Psychiatric: The patient's general behavior, level of consciousness, thought content and emotional status is normal.        Medications:  Scheduled Medications:    calcium elemental  1,250 mg Oral BID    ipratropium-albuterol  1 ampule Inhalation 4x daily    isosorbide mononitrate  30 mg Oral Daily    hydrALAZINE  25 mg Oral 3 times per day    phenylephrine-mineral oil-petrolatum   Rectal BID    pantoprazole  40 mg Oral QAM AC    clopidogrel  75 mg Oral Daily    losartan  50 mg Oral Daily    metoprolol tartrate  37.5 mg Oral BID    furosemide  40 mg Oral Daily    amLODIPine  10 mg Oral Daily    sodium chloride  2 spray Each Nostril TID    bacitracin   Topical TID    atorvastatin  10 mg Oral Nightly    fluticasone  1 puff Inhalation BID    sodium chloride flush  10 mL Intravenous 2 times per day    methylPREDNISolone  40 mg Intravenous Daily    guaiFENesin  600 mg Oral BID     Continuous Infusions:    nitroGLYCERIN Stopped (09/18/20 1828)     PRN Medicationsmagnesium sulfate, 1 g, PRN  labetalol, 10 mg, Q2H PRN  morphine, 2 mg, Q2H PRN    Or  morphine, 4 mg, Q2H PRN  sodium chloride flush, 10 mL, PRN  acetaminophen, 650 mg, Q6H PRN    Or  acetaminophen, 650 mg, Q6H PRN  magnesium hydroxide, 30 mL, Daily PRN  promethazine, 12.5 mg, Q6H PRN    Or  ondansetron, 4 mg, Q6H PRN  albuterol, 2.5 mg, As Directed RT PRN        Diagnostic Labs:  CBC:   Recent Labs     09/17/20  0357  09/18/20  0356  09/18/20  1656 09/18/20  2230 09/19/20  0338   WBC 3.6  --  5.6  --   --   --  1.9*   RBC 2.67*  --  3.17*  --   --   --  3.24*   HGB 7.9*   < > 9.4*   < > 10.1* 9. 5* 9.6*   HCT 25.8*   < > 31.7*   < > 32.5* 31.9* 31.9*   MCV 96.6  --  100.0  --   --   --  98.5   RDW 18.3*  --  17.7*  --   --   --  17.2*   *  --  See Reflexed IPF Result  --   --   --  See Reflexed IPF Result    < > = values in this interval not displayed. BMP:   Recent Labs     09/17/20  0357 09/18/20  0356 09/19/20 0338    139 138   K 4.0 4.0 4.0    102 101   CO2 26 25 27   BUN 47* 50* 52*   CREATININE 1.83* 1.67* 1.57*     BNP: No results for input(s): BNP in the last 72 hours. PT/INR:   Recent Labs     09/17/20 0357 09/18/20 0356 09/19/20 0338   PROTIME 39.6* 24.3* 18.5*   INR 4.0 2.4 1.8     APTT: No results for input(s): APTT in the last 72 hours. CARDIAC ENZYMES: No results for input(s): CKMB, CKMBINDEX, TROPONINI in the last 72 hours. Invalid input(s): CKTOTAL;3  FASTING LIPID PANEL:No results found for: CHOL, HDL, TRIG  LIVER PROFILE: No results for input(s): AST, ALT, ALB, BILIDIR, BILITOT, ALKPHOS in the last 72 hours. MICROBIOLOGY:   Lab Results   Component Value Date/Time    CULTURE NO GROWTH 22 HOURS 09/17/2020 07:53 PM       Imaging:    Xr Chest (2 Vw)    Result Date: 9/15/2020  Cardiomegaly, pulmonary vascular congestion, and bibasilar airspace opacities. Moderate-sized bilateral pleural effusions. ASSESSMENT & PLAN     IMPRESSION  This is a 67 y.o. female with past history of afibrillation on warfarin, recent TAVR for severe aortic stenosis presented with 2-day history of epistaxis and melena while on warfarin and found to have supratherapeutic INR at 5 along with anemia with hemoglobin at 6.4. She was in Hypertensive urgency with acute on chronic respiratory failure likely due to CHF/COPD exacerbation. PLAN:     1. Acute on chronic diastolic heart failure-improved. Echo with normal systolic function. 5 L oxygen through nasal cannula. Continue lasix 40mg p.o. daily. 2. Hypertensive urgency-SBP still running high in the 190s.   Weaned off of

## 2020-09-20 NOTE — PLAN OF CARE
BRONCHOSPASM/BRONCHOCONSTRICTION     [x]         IMPROVE AERATION/BREATH SOUNDS  [x]   ADMINISTER BRONCHODILATOR THERAPY AS APPROPRIATE  [x]   ASSESS BREATH SOUNDS  [x]   IMPLEMENT AEROSOL/MDI PROTOCOL  [x]   PATIENT EDUCATION AS NEEDED     PROVIDE ADEQUATE OXYGENATION WITH ACCEPTABLE SP02/ABG'S    [x]  IDENTIFY APPROPRIATE OXYGEN THERAPY  [x]   MONITOR SP02/ABG'S AS NEEDED   [x]   PATIENT EDUCATION AS NEEDED

## 2020-09-20 NOTE — PROGRESS NOTES
Physical Therapy  Facility/Department: [facility] CAR 2  Daily Treatment Note  NAME: Jose Raymond  : 1940  MRN: [id number]    Date of Service: 2020    Discharge Recommendations:    Further therapy recommended at discharge. PT Equipment Recommendations  Other: Pt has a walker at home    Assessment   Body structures, Functions, Activity limitations: Decreased functional mobility ; Decreased ROM; Decreased strength;Decreased endurance;Decreased balance;Decreased posture  Assessment: Pt insists that she is going home tomorrow. Pt initially refused to work with PT, but later agreed to get up and walk. Pt AMB ~30ft with a RW and CGA, without oxygen. Pt asked to return to the chair due to shortness of breath and reported that she uses oxygen at home. Further PT is recommended at this time, but pt said that she wants to go home tomorrow morning, and can have 24 hr assistance from family, if needed. Pt reports that the distances she would need to walk at home are the same as she has been walking in the hospital.  Prognosis: Fair  PT Education: Goals;PT Role;Plan of Care;General Safety;Gait Training; Adaptive Device Training; Injury Prevention;Pressure Relief  Patient Education: safe use of RW  REQUIRES PT FOLLOW UP: Yes  Activity Tolerance  Activity Tolerance: Patient limited by endurance;Treatment limited secondary to agitation     Patient Diagnosis(es): The primary encounter diagnosis was Other specified anemias. A diagnosis of Elevated brain natriuretic peptide (BNP) level was also pertinent to this visit.      has a past medical history of Acute on chronic respiratory failure ([address] TIFFANIE (acute kidney injury) ([address] Anemia, Anticoagulated, Aortic valve disease, Asthma, Atonic bladder, Atrial fibrillation (HCC), Backache, unspecified, Blood in stool, Breast cancer, left ([address] Cachexia ([address] Chronic venous insufficiency, CKD (chronic kidney disease), COPD (chronic obstructive pulmonary disease) ([address] Coronary atherosclerosis of unspecified type of vessel, native or graft, Current smoker, Dry skin, Edema, Nevarez catheter present, Full dentures, History of blood transfusion, Hyperlipidemia, Hypertension, Impaired mobility, Kyphosis, Loss of weight, MI (myocardial infarction) ([address] Osteoarthritis, Oxygen dependent, Poor appetite, PVD (peripheral vascular disease) ([facility] [address] [facility] Superficial thrombosis of leg, Unspecified sinusitis (chronic), Unspecified urinary incontinence, Unspecified vitamin D deficiency, and Wears glasses. has a past surgical history that includes Hysterectomy, vaginal (8/13/1974); Appendectomy; Mastectomy (Left); joint replacement (Right); eye surgery (Bilateral, 12/2015); Total shoulder arthroplasty (Left, 2016); Colonoscopy; Cardiac catheterization (10/14/2019); and sigmoidoscopy (N/A, 2/27/2020). Restrictions  Restrictions/Precautions  Restrictions/Precautions: General Precautions, Fall Risk, Cardiac, Up as Tolerated  Required Braces or Orthoses?: No  Position Activity Restriction    Subjective   General  Chart Reviewed: Yes  Response To Previous Treatment: Patient with no complaints from previous session. Family / Caregiver Present: No  Subjective  Subjective: Pt and RN agreed to PT today  Pain Screening  Patient Currently in Pain: Denies  Vital Signs  Patient Currently in Pain: Denies       Orientation  Orientation  Overall Orientation Status: Within Normal Limits    Objective   Transfers  Sit to Stand: Minimal Assistance  Stand to sit: Minimal Assistance  Comment: Pt reports that her chair at home is easier to get out of  Ambulation  Ambulation?: Yes  Ambulation 1  Surface: level tile  Device: Rolling Walker  Quality of Gait: pt is kyphotic; shortness of breath noted without O2  Gait Deviations: Slow Saray; Increased SHUN; Decreased step length;Decreased step height  Distance: 30'     Balance  Posture: Fair  Sitting - Static: Good  Standing - Static: Fair;+      Goals  Short term goals  Time Frame for Short term goals: 12 visits  Short term goal 1: independent bed mobility  Short term goal 2: independent transfers  Short term goal 3: independent gait with rw x 20'  Short term goal 4: stair ambulation with 1 HR and min A+1  Patient Goals   Patient goals : return home    Plan    Plan  Times per week: 5-6 visits weekly  Times per day: Daily  Current Treatment Recommendations: Strengthening, ROM, Balance Training, Functional Mobility Training, Transfer Training, Endurance Training, Gait Training, Stair training, Pain Management, Safety Education & Training, Patient/Caregiver Education & Training  Safety Devices  Type of devices: Call light within reach, Chair alarm in place, Gait belt, Patient at risk for falls, Left in chair, Nurse notified  Restraints  Initially in place: No     Therapy Time   Individual Concurrent Group Co-treatment   Time In 1540         Time Out 1606         Minutes 26         Timed Code Treatment Minutes: [facility]

## 2020-09-20 NOTE — PROGRESS NOTES
[facility]  Internal Medicine Teaching Residency Program  Inpatient Daily Progress Note  ______________________________________________________________________________    Patient: Romain Israel  YOB: 1940   SGW:[id number]    Acct: [de-identified]     Room: 2003/2003-01  Admit date: 9/15/2020  Today's date: 09/20/20  Number of days in the hospital: 5    SUBJECTIVE   Admitting Diagnosis: Bleeding with supratherapeutic INR  CC: Epistaxis and melena while on warfarin  Pt examined at bedside. Chart & results reviewed. No acute overnight events. She is currently on 5L o2 NC, maintaining saturation but reports that she is having difficulty catching air. Denies chest pain, vomiting, diarrhea or fever. Mild wheezing on chest examination with basilar crepitations. Vitals: /78, HR 68/min, afebrile    BRIEF HISTORY     The patient is a pleasant 67 y.o. female with past history of atrial fibrillation on warfarin, recent TAVR for severe aortic stenosis presented to ED with multiple episodes of epistaxis and melena with supratherapeutic INR at 5. Patient is a poor historian and is unable to give good history. She reports that for the couple of days she is having profuse epistaxis with Melena. She denied chest pain, abdominal pain, hematemesis or hematochezia. She has chronic Nevarez's catheter placed and chronic bilateral lower extremity swelling with some redness.       In the ED, she was requiring 4 L of oxygen through nasal cannula and was breathing without any significant distress. Her blood pressure was elevated to 179/104 and was started on nitroglycerin drip by cardiology. She had acute on chronic anemia with hemoglobin of 6.4 requiring blood transfusion but her hemoglobin did not come up. Chest x-ray revealed bilateral pleural effusion unchanged from the previous one, along with pulmonary vascular congestion suggestive of fluid overload.   She received 1 dose of Lasix 40 mg in the ED.     When she was transferred to the floor, she started having significant respiratory distress and was requiring 6 L of oxygen through nasal cannula. She had wheezing all over her chest along with bilateral basilar crackles. She was given another dose of Lasix 40 mg along with breathing treatments leading to much improvement. She was given another PRBC transfusion with a goal of keeping her hemoglobin above 8.      Her past medical history is significant for:  -Severe aortic stenosis s/p TAVR 2020  -CAD s/p ANA LAD stenosis 10/2019  -Atrial fibrillation-on warfarin  -Dysphagia and rectal bleeding in the past>last EGD  but no record in the epic. 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 Last flex sig 2020> was unable to pass scope about 20 cm. Follow-up barium enema did not reveal any constricting lesion. OBJECTIVE     Vital Signs:  BP (!) 153/73   Pulse 60   Temp 97.9 °F (36.6 °C) (Oral)   Resp 19   Ht 5' (1.524 m)   Wt 136 lb 14.5 oz (62.1 kg)   LMP  (LMP Unknown)   SpO2 100%   BMI 26.74 kg/m²     Temp (24hrs), Av °F (36.7 °C), Min:97.8 °F (36.6 °C), Max:98.2 °F (36.8 °C)    In: 675   Out: 750 [Urine:750]    Physical Exam:  Constitutional: This is a well developed, well nourished, 25-29.9 - Overweight 67y.o. year old female who is alert, oriented, cooperative and in no apparent distress. Head:normocephalic and atraumatic. EENT:  PERRLA. No conjunctival injections. Septum was midline, mucosa was without erythema, exudates or cobblestoning. No thrush was noted. Neck: Supple without thyromegaly. No elevated JVP. Trachea was midline. Respiratory: B/L coarse wheezing and basal crepts in chest  Cardiovascular: Irregular rhythm  Abdomen: Slightly rounded and soft without organomegaly. No rebound, rigidity or guarding was appreciated. Lymphatic: No lymphadenopathy. Musculoskeletal: Normal curvature of the spine.   No gross muscle weakness. Extremities:  No lower extremity edema, ulcerations, tenderness, varicosities or erythema. Muscle size, tone and strength are normal.  No involuntary movements are noted. Skin:  Warm and dry. Good color, turgor and pigmentation. No lesions or scars.   No cyanosis or clubbing  Neurological/Psychiatric: The patient's general behavior, level of consciousness, thought content and emotional status is normal.        Medications:  Scheduled Medications:    calcium elemental  1,250 mg Oral BID    hydrALAZINE  50 mg Oral 3 times per day    apixaban  2.5 mg Oral BID    ipratropium-albuterol  1 ampule Inhalation 4x daily    isosorbide mononitrate  30 mg Oral Daily    phenylephrine-mineral oil-petrolatum   Rectal BID    pantoprazole  40 mg Oral QAM AC    clopidogrel  75 mg Oral Daily    losartan  50 mg Oral Daily    metoprolol tartrate  37.5 mg Oral BID    furosemide  40 mg Oral Daily    amLODIPine  10 mg Oral Daily    sodium chloride  2 spray Each Nostril TID    bacitracin   Topical TID    atorvastatin  10 mg Oral Nightly    fluticasone  1 puff Inhalation BID    sodium chloride flush  10 mL Intravenous 2 times per day    methylPREDNISolone  40 mg Intravenous Daily    guaiFENesin  600 mg Oral BID     Continuous Infusions:    nitroGLYCERIN Stopped (09/18/20 1828)     PRN Medicationsmagnesium sulfate, 1 g, PRN  labetalol, 10 mg, Q2H PRN  morphine, 2 mg, Q2H PRN    Or  morphine, 4 mg, Q2H PRN  sodium chloride flush, 10 mL, PRN  acetaminophen, 650 mg, Q6H PRN    Or  acetaminophen, 650 mg, Q6H PRN  magnesium hydroxide, 30 mL, Daily PRN  promethazine, 12.5 mg, Q6H PRN    Or  ondansetron, 4 mg, Q6H PRN  albuterol, 2.5 mg, As Directed RT PRN        Diagnostic Labs:  CBC:   Recent Labs     09/18/20  0356  09/19/20  0338 09/19/20  0946 09/19/20  1606   WBC 5.6  --  1.9* 3.8  --    RBC 3.17*  --  3.24* 3.29*  --    HGB 9.4*   < > 9.6* 10.0* 10.7*   HCT 31.7*   < > 31.9* 32.8* 35.3*   MCV 100.0  --  98.5 99.7  --    RDW 17.7*  --  17.2* 17.4*  --    PLT See Reflexed IPF Result  --  See Reflexed IPF Result See Reflexed IPF Result  --     < > = values in this interval not displayed. BMP:   Recent Labs     09/17/20  0357 09/18/20  0356 09/19/20  0338    139 138   K 4.0 4.0 4.0    102 101   CO2 26 25 27   BUN 47* 50* 52*   CREATININE 1.83* 1.67* 1.57*     BNP: No results for input(s): BNP in the last 72 hours. PT/INR:   Recent Labs     09/17/20 0357 09/18/20  0356 09/19/20  0338   PROTIME 39.6* 24.3* 18.5*   INR 4.0 2.4 1.8     APTT: No results for input(s): APTT in the last 72 hours. CARDIAC ENZYMES: No results for input(s): CKMB, CKMBINDEX, TROPONINI in the last 72 hours. Invalid input(s): CKTOTAL;3  FASTING LIPID PANEL:No results found for: CHOL, HDL, TRIG  LIVER PROFILE: No results for input(s): AST, ALT, ALB, BILIDIR, BILITOT, ALKPHOS in the last 72 hours. MICROBIOLOGY:   Lab Results   Component Value Date/Time    CULTURE NO GROWTH 2 DAYS 09/17/2020 07:53 PM       Imaging:    Xr Chest (2 Vw)    Result Date: 9/15/2020  Cardiomegaly, pulmonary vascular congestion, and bibasilar airspace opacities. Moderate-sized bilateral pleural effusions. ASSESSMENT & PLAN     IMPRESSION  This is a 67 y.o. female with past history of afibrillation on warfarin, recent TAVR for severe aortic stenosis presented with 2-day history of epistaxis and melena while on warfarin and found to have supratherapeutic INR at 5 along with anemia with hemoglobin at 6.4. She was in Hypertensive urgency with acute on chronic respiratory failure likely due to CHF/COPD exacerbation. PLAN:     1. Acute on chronic diastolic heart failure-improved. Echo with normal systolic function. 5 L oxygen through nasal cannula. Will switch Lasix to 40 mg twice daily  2. Hypertensive urgency-SBP still running high in the 190s. Weaned off of nitroglycerin drip.   Well adjust doses of Imdur, hydralazine, amlodipine and Cozaar to control blood pressure. 3. Atrial fibrillation-rate controled. Continue Eliquis. 4. Acute on chronic anemia due to GI bleed with admitting hemoglobin of 6.4 s/p 4 PRBC transfusion. Melena and epistaxis resolved. No indication for scope as per GI. Goal Hb >8 to avoid demand ischemia. Continue Protonix once daily. H&H monitoring. 5. Epistasis while on warfarin-resolved. Nasal saline, bacitracin ointment and highly humidified oxygen as per ENT  6. Supratherapeutic INR while on warfarin-resolved. 7. COPD exacerbation-on 2 L home oxygen-improved. Continue methylprednisone 40 mg daily and breathing treatment  8. Pain-less right knee large effusion s/p drainage > sterile. 9. TAVR for severe aortic stenosis 12/8/2020. ECHO with normal positioning of valve with normal ventricular function. 10. CAD s/p ANA for 90% LAD stenosis-stable. Continue Plavix, Imdur, Lipitor and metoprolol. 11. Discharge planning: Pending placement     DVT ppx:  Eliquis  GI ppx: Protonix     PT/OT/SW: On board   Discharge Planning: On board    Edna Yoder MD  Internal Medicine Resident, PGY-1  [address], [place]  9/20/2020, 1:16 AM

## 2020-09-20 NOTE — CARE COORDINATION
Informed by bedside nurse Nasrin that she received a call from the pt's family member that identified herself as the pt s granddaughter. She informed the pt was to d/c home with son Amanda Leonard that the pt resides with, the intake note indicates that Yessi Chacon is the pt's POA and has requested admission to SNF. Nurse informs pt is calling out loudly but is oriented and does not wish to go to SNF. CM spoke with Yessi Chacon (Pt's daughter) she informs that her brother Amanda Leonard is unable to care for his mom at home and she will need a short stay in SNF for rehab. She informs that she is going to speak with her brother and call back within the hour.

## 2020-09-20 NOTE — PLAN OF CARE
Problem: Skin Integrity:  Goal: Will show no infection signs and symptoms  Description: Will show no infection signs and symptoms  9/20/2020 0815 by Avtar Duarte RN  Outcome: Ongoing  9/20/2020 0814 by Avtar Duarte RN  Outcome: Ongoing  9/20/2020 0402 by Nusrat Hinds RN  Outcome: Met This Shift  Goal: Absence of new skin breakdown  Description: Absence of new skin breakdown  9/20/2020 0815 by Avtar Duarte RN  Outcome: Ongoing  9/20/2020 0814 by Avtar Duarte RN  Outcome: Ongoing  9/20/2020 0402 by Nusrat Hinds RN  Outcome: Met This Shift     Problem: Falls - Risk of:  Goal: Will remain free from falls  Description: Will remain free from falls  9/20/2020 0815 by Avtar Duarte RN  Outcome: Ongoing  9/20/2020 0814 by Avtar Duarte RN  Outcome: Ongoing  9/20/2020 0402 by Nusrat Hinds RN  Outcome: Met This Shift  Goal: Absence of physical injury  Description: Absence of physical injury  9/20/2020 0815 by Avtar Duarte RN  Outcome: Ongoing  9/20/2020 0814 by Avtar Duarte RN  Outcome: Ongoing  9/20/2020 0402 by Nusrat Hinds RN  Outcome: Met This Shift

## 2020-09-20 NOTE — PLAN OF CARE
Problem: Skin Integrity:  Goal: Will show no infection signs and symptoms  Description: Will show no infection signs and symptoms  9/20/2020 0814 by Caleb Covington RN  Outcome: Ongoing  9/20/2020 0402 by Selma Yanez RN  Outcome: Met This Shift  Goal: Absence of new skin breakdown  Description: Absence of new skin breakdown  9/20/2020 0814 by Caleb Covington RN  Outcome: Ongoing  9/20/2020 0402 by Selma Yanez RN  Outcome: Met This Shift

## 2020-09-20 NOTE — PLAN OF CARE
BRONCHOSPASM/BRONCHOCONSTRICTION     [x]         IMPROVE AERATION/BREATH SOUNDS  [x]   ADMINISTER BRONCHODILATOR THERAPY AS APPROPRIATE  [x]   ASSESS BREATH SOUNDS  [x]   IMPLEMENT AEROSOL/MDI PROTOCOL  [x]   PATIENT EDUCATION AS NEEDED      NON INVASIVE VENTILATION  PROVIDE OPTIMAL VENTILATION/ACCEPTABLE SP02  IMPLEMENT NON INVASIVE VENTILATION PROTOCOL  ASSESSMENT SKIN INTEGRITY  PATIENT EDUCATION AS NEEDED  BIPAP AS NEEDED

## 2020-09-20 NOTE — PLAN OF CARE
Problem: Skin Integrity:  Goal: Will show no infection signs and symptoms  Description: Will show no infection signs and symptoms  Outcome: Met This Shift  Goal: Absence of new skin breakdown  Description: Absence of new skin breakdown  Outcome: Met This Shift     Problem: Falls - Risk of:  Goal: Will remain free from falls  Description: Will remain free from falls  Outcome: Met This Shift  Goal: Absence of physical injury  Description: Absence of physical injury  Outcome: Met This Shift     Problem: Bleeding:  Goal: Will show no signs and symptoms of excessive bleeding  Description: Will show no signs and symptoms of excessive bleeding  Outcome: Met This Shift     Problem: Pain:  Goal: Pain level will decrease  Description: Pain level will decrease  Outcome: Met This Shift  Goal: Control of acute pain  Description: Control of acute pain  Outcome: Met This Shift  Goal: Control of chronic pain  Description: Control of chronic pain  Outcome: Met This Shift     Problem: Gas Exchange - Impaired:  Goal: Levels of oxygenation will improve  Description: Levels of oxygenation will improve  Outcome: Met This Shift

## 2020-09-20 NOTE — PROGRESS NOTES
Patient uncooperative most of day. Alert and oriented verbalized she doesn't want Prudenciondas name on her chart and nobody is to talk to her. She stated that [facility] wants her to go to place for a few weeks before she goes home and she isnt doing that. She stated she is going home with Kate Carter

## 2020-09-20 NOTE — PROGRESS NOTES
[facility]   Progress Note                    Date:   9/20/2020  Patient name:  Cheri Arnold  Date of admission:  9/15/2020  8:42 AM  MRN:   [id number]  YOB: 1940  PCP:    Carter Quintana MD    Reason for Admission:  Anemia [D64.9]    Subjective:        Clinical Changes / Abnormalities:    Patient seen and examined at bedside. No acute events overnight. No chest pain or shortness of breath. Reports feeling significantly better since admission. Sitting comfortably having lunch in recliner this afternoon.       Urine output in the last 24 hours:     Intake/Output Summary (Last 24 hours) at 9/20/2020 1728  Last data filed at 9/20/2020 0506  Gross per 24 hour   Intake 675 ml   Output 1100 ml   Net -425 ml     I/O since admission: +89 cc      Medications:   Scheduled Meds:   isosorbide mononitrate  60 mg Oral Daily    furosemide  40 mg Oral Daily    calcium elemental  1,250 mg Oral BID    hydrALAZINE  50 mg Oral 3 times per day    apixaban  2.5 mg Oral BID    ipratropium-albuterol  1 ampule Inhalation 4x daily    phenylephrine-mineral oil-petrolatum   Rectal BID    pantoprazole  40 mg Oral QAM AC    clopidogrel  75 mg Oral Daily    losartan  50 mg Oral Daily    metoprolol tartrate  37.5 mg Oral BID    amLODIPine  10 mg Oral Daily    sodium chloride  2 spray Each Nostril TID    bacitracin   Topical TID    atorvastatin  10 mg Oral Nightly    fluticasone  1 puff Inhalation BID    sodium chloride flush  10 mL Intravenous 2 times per day    methylPREDNISolone  40 mg Intravenous Daily    guaiFENesin  600 mg Oral BID     Continuous Infusions:  CBC:   Recent Labs     09/19/20  0338 09/19/20  0946 09/19/20  1606 09/20/20  0329 09/20/20  1003   WBC 1.9* 3.8  --  3.8  --    HGB 9.6* 10.0* 10.7* 9.8* 10.9*   PLT See Reflexed IPF Result See Reflexed IPF Result  --  See Reflexed IPF Result  --      BMP:    Recent Labs     09/18/20  0356 09/19/20  0338 09/20/20  0329    138 137   K 4.0 4.0 4.0    101 100   CO2 25 27 31   BUN 50* 52* 56*   CREATININE 1.67* 1.57* 1.55*   GLUCOSE 102* 119* 95     Hepatic: No results for input(s): AST, ALT, ALB, BILITOT, ALKPHOS in the last 72 hours. Troponin: No results for input(s): TROPONINI in the last 72 hours. Recent Labs     09/18/20  2230 09/19/20  0338 09/19/20  0946   TROPONINT NOT REPORTED NOT REPORTED NOT REPORTED     BNP: No results for input(s): PROBNP in the last 72 hours. No results for input(s): BNP in the last 72 hours. Lipids: No results for input(s): CHOL, HDL in the last 72 hours. Invalid input(s): LDLCALCU  INR:   Recent Labs     09/18/20  0356 09/19/20  0338 09/20/20  0329   INR 2.4 1.8 1.5       Objective:   Vitals: BP (!) 140/66   Pulse 62   Temp 97.9 °F (36.6 °C) (Oral)   Resp 20   Ht 5' (1.524 m)   Wt 136 lb 14.5 oz (62.1 kg)   LMP  (LMP Unknown)   SpO2 100%   BMI 26.74 kg/m²    Constitutional and General Appearance:   · alert, cooperative, no distress and appears stated age  Respiratory:  · No for increased work of breathing. · On auscultation: clear to auscultation bilaterally  Cardiovascular:  · The apical impulse is not displaced  · Heart tones are crisp and normal. Regular S1 and S2. No murmurs. Abdomen:   · No masses or tenderness  · Bowel sounds present  Extremities:  ·  No Cyanosis or Clubbing  ·  Lower extremity edema: No  ·  Skin: Warm and dry  Neurological:  · Alert and oriented.   · Moves all extremities well     Diagnostic Studies:      EKG:  Sinus rhythm with Premature ventricular complexes or Fusion complexes  Left ventricular hypertrophy with QRS widening and repolarization abnormality  Abnormal ECG  When compared with ECG of 11-AUG-2020 10:28,  Sinus rhythm has replaced Atrial fibrillation  Left bundle branch block is no longer Present  ECHO:   Post TAVR echocardiogram  Good TAVR position with no significant abnormalities and improved mean  gradient to minimal,  Normal LV systolic function  Mild mitral regurgitation (Improved from before)  Mild tricuspid regurgitation  Increased PAP to 45 mmHg  Stress Test: not obtained. Cardiac Angiography:  CATH 10/14/19: Single vessel significant CAD, mid LAD 90%, required PTCA/ANA. Minimal disease in RCA and LCx. EF 50%. Severe AS with mean gradient 34 mmHg and valve area 0.7 cm2. Abdominal aorta gram and iliac arteries, suggest no serious stenosis.          Assessment / Acute Cardiac Problems:   1. Anemia in the setting of supratherapuetic INR (INR on admission 5.0; Hb 6.4) s/p 3 units packed RBC's  2. Supratherapuetic INR (5.0 on admission) on coumadin. Now 1.8  3. Epistaxis  4. Melena likely d/t ingestion of blood from epistaxis  5. Severe AS s/p TAVR  6. Paroxysmal Atrial Fibrillation on coumadin  7. CAD s/p PTCA-ANA to LAD (September 2019)  8. Smoker  9. COPD        Plan of Treatment:   1. Will start on eliquis 2.5 BID  2. Continue plavix  3. Resume home BB medications  4. Weaned off nitroglycerin gtt  5. Continue BB & ARB  6. Switch to PO lasix  7. K>4, Mg>2  8. Cardiology will sign off at this point. Please contact with any further questions. Chitra Patten MD  Fellow, [address]          Attending Physician Statement  I have discussed the care of Alicia Crouch, including pertinent history and exam findings,  with the cardiology fellow/resident. I have seen and examined the patient and the key elements of all parts of the encounter have been performed by me.       Derwood Castleman, MD, [facility]

## 2020-09-21 NOTE — PROGRESS NOTES
spine.  No gross muscle weakness. Extremities:  No lower extremity edema, ulcerations, tenderness, varicosities or erythema. Muscle size, tone and strength are normal.  No involuntary movements are noted. Skin:  Warm and dry. Good color, turgor and pigmentation. No lesions or scars.   No cyanosis or clubbing  Neurological/Psychiatric: The patient's general behavior, level of consciousness, thought content and emotional status is normal.        Medications:  Scheduled Medications:    isosorbide mononitrate  60 mg Oral Daily    furosemide  40 mg Oral Daily    calcium elemental  1,250 mg Oral BID    hydrALAZINE  50 mg Oral 3 times per day    apixaban  2.5 mg Oral BID    ipratropium-albuterol  1 ampule Inhalation 4x daily    phenylephrine-mineral oil-petrolatum   Rectal BID    pantoprazole  40 mg Oral QAM AC    clopidogrel  75 mg Oral Daily    losartan  50 mg Oral Daily    metoprolol tartrate  37.5 mg Oral BID    amLODIPine  10 mg Oral Daily    sodium chloride  2 spray Each Nostril TID    bacitracin   Topical TID    atorvastatin  10 mg Oral Nightly    fluticasone  1 puff Inhalation BID    sodium chloride flush  10 mL Intravenous 2 times per day    methylPREDNISolone  40 mg Intravenous Daily    guaiFENesin  600 mg Oral BID     Continuous Infusions:     PRN Medicationsmagnesium sulfate, 1 g, PRN  labetalol, 10 mg, Q2H PRN  morphine, 2 mg, Q2H PRN    Or  morphine, 4 mg, Q2H PRN  sodium chloride flush, 10 mL, PRN  acetaminophen, 650 mg, Q6H PRN    Or  acetaminophen, 650 mg, Q6H PRN  magnesium hydroxide, 30 mL, Daily PRN  promethazine, 12.5 mg, Q6H PRN    Or  ondansetron, 4 mg, Q6H PRN  albuterol, 2.5 mg, As Directed RT PRN        Diagnostic Labs:  CBC:   Recent Labs     09/19/20  0946  09/20/20  0329 09/20/20  1003 09/21/20  0737   WBC 3.8  --  3.8  --  5.8   RBC 3.29*  --  3.25*  --  3.34*   HGB 10.0*   < > 9.8* 10.9* 10.1*   HCT 32.8*   < > 32.7* 33.7* 33.5*   MCV 99.7  --  100.6  --  100.3   RDW 17.4*  --  17.2*  --  17.6*   PLT See Reflexed IPF Result  --  See Reflexed IPF Result  --  120*    < > = values in this interval not displayed. BMP:   Recent Labs     09/19/20  0338 09/20/20  0329 09/21/20  0737    137 142   K 4.0 4.0 4.1    100 103   CO2 27 31 31   BUN 52* 56* 63*   CREATININE 1.57* 1.55* 1.36*     BNP: No results for input(s): BNP in the last 72 hours. PT/INR:   Recent Labs     09/19/20  0338 09/20/20  0329 09/21/20  0737   PROTIME 18.5* 15.7* 12.5*   INR 1.8 1.5 1.2     APTT: No results for input(s): APTT in the last 72 hours. CARDIAC ENZYMES: No results for input(s): CKMB, CKMBINDEX, TROPONINI in the last 72 hours. Invalid input(s): CKTOTAL;3  FASTING LIPID PANEL:No results found for: CHOL, HDL, TRIG  LIVER PROFILE: No results for input(s): AST, ALT, ALB, BILIDIR, BILITOT, ALKPHOS in the last 72 hours. MICROBIOLOGY:   Lab Results   Component Value Date/Time    CULTURE NO GROWTH 3 DAYS 09/17/2020 07:53 PM       Imaging:    Xr Chest (2 Vw)    Result Date: 9/15/2020  Cardiomegaly, pulmonary vascular congestion, and bibasilar airspace opacities. Moderate-sized bilateral pleural effusions. ASSESSMENT & PLAN     IMPRESSION  This is a 67 y.o. female with past history of afibrillation on warfarin, recent TAVR for severe aortic stenosis presented with 2-day history of epistaxis and melena while on warfarin and found to have supratherapeutic INR at 5 along with anemia with hemoglobin at 6.4. She was in Hypertensive urgency with acute on chronic respiratory failure likely due to CHF/COPD exacerbation. PLAN:     1. Acute on chronic diastolic heart failure-improved. Echo with normal systolic function. Continue oxygen through nasal cannula. Continue Lasix 40 mg daily  2. Hypertensive urgency-SBP still running high in the 190s. Weaned off of nitroglycerin drip. Well adjust doses of Imdur, hydralazine, amlodipine and Cozaar to control blood pressure.     3. Atrial fibrillation-rate controled. Continue Eliquis. 4. Acute on chronic anemia due to GI bleed with admitting hemoglobin of 6.4 s/p 4 PRBC transfusion. Melena and epistaxis resolved. No indication for scope as per GI. Goal Hb >8 to avoid demand ischemia. Continue Protonix once daily. H&H monitoring. 5. Epistasis while on warfarin-resolved. Nasal saline, bacitracin ointment and highly humidified oxygen as per ENT  6. Supratherapeutic INR while on warfarin-resolved. 7. COPD exacerbation-on 2 L home oxygen-improved. Continue methylprednisone 40 mg daily and breathing treatment  8. Pain-less right knee large effusion s/p drainage > sterile. 9. TAVR for severe aortic stenosis 12/8/2020. ECHO with normal positioning of valve with normal ventricular function. 10. CAD s/p ANA for 90% LAD stenosis-stable. Continue Plavix, Imdur, Lipitor and metoprolol. 11. Discharge planning: Patient is now willing to go to rehab facility, she wants to go to home. Talked to granddaughter on phone and she also prefers patient to be home. Patient son to visit of hospital today> will convey that to him as well.     DVT ppx:  Eliquis  GI ppx: Protonix     PT/OT/SW: On board   Discharge Planning:  Discharge home today    Lizbeth Miramontes MD  Internal Medicine Resident, PGY-1  [facility];  [facility]  9/21/2020, 11:59 AM

## 2020-09-21 NOTE — CARE COORDINATION
Transitional planning-talked with son Zoe Barreto and patient about SNF and home Care. Patient agreed to home care. List given to dariana Barreto. Chose [facility]-don't accept her insurance. [facility]-not going out to [facility]. Chose [facility]-referral faxed and called to [facility]. [phone] called [facility] and talked with Molly-notified her of patient's discharge.  They will pull needed information from [facility]

## 2020-09-21 NOTE — PROGRESS NOTES
Patient call light answered by another floor nurse after patient called hospital . Floor nurse reported to this nurse that patient wanted her O2 turned up and was upset she was not being taken care of. Writer had not received unit cell phone and was unaware that patient's call light was going off as writer was doing assessment of another patient. Writer went in and explained to the patient why there was a delay. Also educated patient that her O2 had been at 3 lpm and her SpO2 was at 97% which was sufficient. Continued to educate patient that her O2 at 6 lpm, that she had requested other nurse to turn it up to, was unsafe due to her COPD. Patient agreed to O2 at 4 lpm which writer obliged. Patient continued to be very verbal about how she is leaving this hospital tomorrow and never coming back as she has not had good nursing care since she has been here. Patient refused nasal medications and stated she didn't need hemorrhoid and the staff was crazy for thinking she had hemorrhoids. Patient apologized for her behavior to writer before exiting the room.

## 2020-09-21 NOTE — PROGRESS NOTES
Occupational Therapy Not Seen Note    DATE: 2020  Name: Ander Morin  : 1940  MRN: [id number]    Patient not available for Occupational Therapy due to: Attempt this afternoon but pt being discharged soon.     Next Scheduled Treatment: pt being discharged    Electronically signed by PAMELA Cullen on 2020 at 3:23 PM

## 2020-09-21 NOTE — PLAN OF CARE
BRONCHOSPASM/BRONCHOCONSTRICTION     [x]         IMPROVE AERATION/BREATH SOUNDS  [x]   ADMINISTER BRONCHODILATOR THERAPY AS APPROPRIATE  [x]   ASSESS BREATH SOUNDS  [x]   IMPLEMENT AEROSOL/MDI PROTOCOL  [x]   PATIENT EDUCATION AS NEEDED   PATIENT REFUSES TO WEAR BIPAP     [x] Risks and benefits explained to patient   [x] Patient refuses to wear Bipap stating \"I cant sleep with it on, if I feel sob, i'll call\"  [x] Patient verbalizes understanding of information presented.

## 2020-09-21 NOTE — PLAN OF CARE
Problem: Skin Integrity:  Goal: Will show no infection signs and symptoms  Description: Will show no infection signs and symptoms  9/21/2020 1330 by Toney Ramirez RN  Outcome: Completed  9/21/2020 0708 by Toney Ramirez RN  Outcome: Ongoing  9/21/2020 0540 by Deisy Castillo RN  Outcome: Met This Shift  Goal: Absence of new skin breakdown  Description: Absence of new skin breakdown  9/21/2020 1330 by Toney Ramirez RN  Outcome: Completed  9/21/2020 0708 by Toney Ramirez RN  Outcome: Ongoing  9/21/2020 0540 by Deisy Castillo RN  Outcome: Met This Shift

## 2020-09-21 NOTE — DISCHARGE INSTR - DIET

## 2020-09-21 NOTE — PLAN OF CARE
[facility] Anemia [D64.9] . Vitals:    09/21/20 0809   BP: (!) 155/71   Pulse: 63   Resp: 20   Temp: 97.9 °F (36.6 °C)   SpO2: 100%   . Patients home meds are   Prior to Admission medications    Medication Sig Start Date End Date Taking? Authorizing Provider   valACYclovir (VALTREX) 1 g tablet Take 1,000 mg by mouth 3 times daily Last dose on 9/14. Three more days of cycle left per son. Yes Historical Provider, MD   celecoxib (CELEBREX) 200 MG capsule Take 200 mg by mouth daily    Yes Historical Provider, MD   losartan (COZAAR) 50 MG tablet Take 50 mg by mouth daily    Yes Historical Provider, MD   furosemide (LASIX) 80 MG tablet Take 80 mg by mouth 2 times daily   Yes Historical Provider, MD   umeclidinium-vilanterol (ANORO ELLIPTA) 62.5-25 MCG/INH AEPB inhaler Inhale 1 puff into the lungs daily 6/3/20  Yes Hallie Toscano MD   warfarin (COUMADIN) 1 MG tablet Take 1 mg by mouth daily monday   Yes Historical Provider, MD   hydrochlorothiazide (MICROZIDE) 12.5 MG capsule Take 12.5 mg by mouth daily   Yes Historical Provider, MD   metoprolol tartrate (LOPRESSOR) 25 MG tablet Take 0.5 tablets by mouth 2 times daily 10/15/19  Yes MASON Bond CNP   clopidogrel (PLAVIX) 75 MG tablet Take 1 tablet by mouth daily 10/16/19  Yes MASON Bond CNP   albuterol (ACCUNEB) 1.25 MG/3ML nebulizer solution Inhale 1 ampule into the lungs every 6 hours as needed for Wheezing   Yes Historical Provider, MD   FLOVENT  MCG/ACT inhaler Inhale 2 puffs into the lungs 2 times daily  7/28/15  Yes Historical Provider, MD   PROVENTIL  (90 BASE) MCG/ACT inhaler Inhale 1 puff into the lungs 4 times daily  4/15/15  Yes Historical Provider, MD   atorvastatin (LIPITOR) 10 MG tablet Take 10 mg by mouth daily Take 1/2 tablet daily.    Yes Historical Provider, MD   warfarin (COUMADIN) 4 MG tablet Take 2 mg by mouth daily [id number]-sat-[id number]   Yes Historical Provider, MD   montelukast (SINGULAIR) 10 MG tablet Take 10 mg by mouth nightly. Yes Historical Provider, MD   Acetaminophen (TYLENOL PO) Take  by mouth as needed.    Yes Historical Provider, MD   cephALEXin (KEFLEX) 500 MG capsule Take 1 capsule by mouth 2 times daily 8/13/20   Amy Harris MD   .  Recent Surgical History: None = 0     Assessment     Peak Flow (asthma only)    Predicted: 282  Personal Best: NA  PEF   % Predicted   Peak Flow :     FEV1/FVC    FEV1 Predicted 1.82      FEV1 NA    FEV1 % Predicted   FVC   IS volume   IBW 45.5 kg    RR 24  Breath Sounds: clear      Bronchodilator assessment at level  3  Hyperinflation assessment at level   Secretion Management assessment at level      [x]    Bronchodilator Assessment  BRONCHODILATOR ASSESSMENT SCORE  Score 0 1 2 3 4 5   Breath Sounds   []  Patient Baseline [x]  No Wheeze good aeration []  Faint, scattered wheezing, good aeration []  Expiratory Wheezing and or moderately diminished []  Insp/Exp wheeze and/or very diminished []  Insp/Exp and/ or marked distress   Respiratory Rate   []  Patient Baseline []  Less than 20 []  Less than 20 [x]  20-25 []  Greater than 25 []  Greater than 25   Peak flow % of Pred or PB []  NA   []  Greater than 90%  []  81-90% []  71-80% []  Less than or equal to 70%  or unable to perform []  Unable due to Respiratory Distress   Dyspnea re []  Patient Baseline []  No SOB []  No SOB [x]  SOB on exertion []  SOB min activity []  At rest/acute   e FEV% Predicted       []  NA []  Above 69%  []  Unable []  Above 60-69%  []  Unable []  Above 50-59%  []  Unable []  Above 35-49%  []  Unable []  Less than 35%  []  Unable                 []  Hyperinflation Assessment  Score 1 2 3   CXR and Breath Sounds   []  Clear []  No atelectasis  Basilar aeration []  Atelectasis or absent basilar breath sounds   Incentive Spirometry Volume  (Per IBW)   []  Greater than or equal to 15ml/Kg []  less than 15ml/Kg []  less than 15ml/Kg Surgery within last 2 weeks []  None or general   []  Abdominal or thoracic surgery  []  Abdominal or thoracic   Chronic Pulmonary Historyre []  No []  Yes []  Yes     []  Secretion Management Assessment  Score 1 2 3   Bilateral Breath Sounds   []  Occasional Rhonchi []  Scattered Rhonchi []  Course Rhonchi and/or poor aeration   Sputum    []  Small amount of thin secretions []  Moderate amount of viscous secretions []  Copius, Viscious Yellow/ Secretions   CXR as reported by physician []  clear  []  Unavailable []  Infiltrates and/or consolidation  []  Unavailable []  Mucus Plugging and or lobar consolidation  []  Unavailable   Cough []  Strong, productive cough []  Weak productive cough []  No cough or weak non-productive cough   ISAEL AC  8:36 AM                            FEMALE                                  MALE                            FEV1 Predicted Normal Values                        FEV1 Predicted Normal Values          Age                                     Height in Feet and Inches       Age                                     Height in Feet and Inches       4' 11\" 5' 1\" 5' 3\" 5' 5\" 5' 7\" 5' 9\" 5' 11\" 6' 1\"  4' 11\" 5' 1\" 5' 3\" 5' 5\" 5' 7\" 5' 9\" 5' 11\" 6' 1\"   42 - 45 2.49 2.66 2.84 3.03 3.22 3.42 3.62 3.83 42 - 45 2.82 3.03 3.26 3.49 3.72 3.96 4.22 4.47   46 - 49 2.40 2.57 2.76 2.94 3.14 3.33 3.54 3.75 46 - 49 2.70 2.92 3.14 3.37 3.61 3.85 4.10 4.36   50 - 53 2.31 2.48 2.66 2.85 3.04 3.24 3.45 3.66 50 - 53 2.58 2.80 3.02 3.25 3.49 3.73 3.98 4.24   54 - 57 2.21 2.38 2.57 2.75 2.95 3.14 3.35 3.56 54 - 57 2.46 2.67 2.89 3.12 3.36 3.60 3.85 4.11   58 - 61 2.10 2.28 2.46 2.65 2.84 3.04 3.24 3.45 58 - 61 2.32 2.54 2.76 2.99 3.23 3.47 3.72 3.98   62 - 65 1.99 2.17 2.35 2.54 2.73 2.93 3.13 3.34 62 - 65 2.19 2.40 2.62 2.85 3.09 3.33 3.58 3.84   66 - 69 1.88 2.05 2.23 2.42 2.61 2.81 3.02 3.23 66 - 69 2.04 2.26 2.48 2.71 2.95 3.19 3.44 3.70   70+ 1.82 1.99 2.17 2.36 2.55 2.75 2.95 3.16 70+ 1.97

## 2020-09-21 NOTE — PROGRESS NOTES
Discharge instructions given to patients dariana Damon . Urethral catheter exchanged to leg bag.  Arsen Damon verbalized understanding of discharge instructions

## 2020-09-21 NOTE — CARE COORDINATION
Discharge [facility] [place], [place] Case Management Department  Written by: Elodia Guy RN    Patient Name: Keagan De La Cruz  Attending Provider: No att. providers found  Admit Date: 9/15/2020  8:42 AM  MRN: [id number]  Account: [de-identified]                     : 1940  Discharge Date: 2020      Disposition: home with Kylah Suárez RN

## 2020-09-22 NOTE — CARE COORDINATION
to patient? Yes. Reviewed appropriate site of care based on symptoms and resources available to patient including: PCP, Specialist and When to call 911. The patient agrees to contact the PCP office for questions related to their healthcare. Medication reconciliation was performed with patient, who verbalizes understanding of administration of home medications. Advised obtaining a 90-day supply of all daily and as-needed medications. Covid Risk Education    Patient has following risk factors of: COPD. Education provided regarding infection prevention, and signs and symptoms of COVID-19 and when to seek medical attention with patient who verbalized understanding. Discussed exposure protocols and quarantine From CDC: Are you at higher risk for severe illness?   and given an opportunity for questions and concerns. The patient agrees to contact the COVID-19 hotline [phone] or PCP office for questions related to COVID-19. For more information on steps you can take to protect yourself, see CDC's How to Protect Yourself     Patient/family/caregiver given information for Get[employer] and agrees to enroll no  Patient's preferred e-mail: declines  Patient's preferred phone number: declines    Discussed follow-up appointments. If no appointment was previously scheduled, appointment scheduling offered: No. Is follow up appointment scheduled within 7 days of discharge? Yes  Non-[facility] follow up appointment(s): PCP next week    Plan for follow-up call in 7-10 days based on severity of symptoms and risk factors. Plan for next call: covid follow up  CTN provided contact information for future needs.         Non-face-to-face services provided:  Scheduled appointment with PCP-next week, pt did not have date  Scheduled appointment with Specialist-urologist next week  Obtained and reviewed discharge summary and/or continuity of care documents  Assessment and support for treatment adherence and medication management-reviewed    Care Transitions 24 Hour Call    Do you have any ongoing symptoms?:  No  Do you have a copy of your discharge instructions?:  Yes  Do you have all of your prescriptions and are they filled?:  Yes  Have you been contacted by a [facility] Pharmacist?:  No  Have you scheduled your follow up appointment?:  Yes  How are you going to get to your appointment?:  Car - family or friend to transport  Were you discharged with any Home Care or Post Acute Services:  Yes  Post Acute Services:  Home Health (Comment: [place])  Do you feel like you have everything you need to keep you well at home?:  Yes  Care Transitions Interventions         Follow Up  Future Appointments   Date Time Provider Bethanie Quintero   10/7/2020 11:00 AM MD Zeferino Rodriguez DPP       Cash Barahona RN

## 2020-09-23 NOTE — DISCHARGE SUMMARY
[facility]     Department of Internal Medicine - Staff Internal Medicine Teaching Service    INPATIENT DISCHARGE SUMMARY      Patient Identification:  Lorenzo Powers is a 67 y.o. female. :  1940  MRN: [id number]     Acct: [de-identified]   PCP: Hemant Langley MD  Admit Date:  9/15/2020  Discharge date and time: 2020  5:44 PM   Attending Provider: No att. providers found                                     [address] Problem Lists:  Principal Problem:    Acute on chronic respiratory failure with hypoxia ([address]  Active Problems:    A-fib ([address]    Hypertension    Stage 3 chronic kidney disease ([address]    Anemia    TIFFANIE (acute kidney injury) ([address]    Elevated brain natriuretic peptide (BNP) level    Acute on chronic diastolic CHF (congestive heart failure) (HCC)    Melena    Rectal bleeding    Acute blood loss anemia    Elevated INR (international normalized ratio) due to prior anticoagulant medication ingestion  Resolved Problems:    * No resolved hospital problems. *      HOSPITAL STAY     Brief Inpatient course:   Lorenzo Powers is a 67 y.o. female with PMH of A. fib on warfarin was admitted with epistaxis, melena with supratherapeutic INR at 5 and acute anemia requiring multiple PRBC transfusions. Epistasis resolved spontaneously and melena was like related to blood swallowing from epistaxis. She was later switched to Eliquis without any further bleeding. She also had hypertensive urgency leading to acute exacerbation of chronic diastolic heart failure. She was started on nitroglycerin drip with gradual wean off and later on switched to oral medications. She was managed with careful diuresis due to ongoing TIFFANIE. Her condition improved with diuresis, antihypertensives and she was discharged in stable condition. Patient was advised discharge to facility but she refused to go to a facility and preferred to go home.     significant therapeutic interventions done at the hospital:   1. Acute on chronic diastolic heart failure-improved. Echo with normal systolic function. Continue oxygen through nasal cannula. Continue Lasix 40 mg daily  2. Hypertensive urgency-resolved. Weaned off of nitroglycerin drip. Adjusted doses of Imdur, hydralazine, amlodipine and Cozaar to control blood pressure. 3. Atrial fibrillation-rate controled. Continue Eliquis. 4. Acute on chronic anemia due to GI bleed with admitting hemoglobin of 6.4 s/p 4 PRBC transfusion. Melena and epistaxis resolved. No indication for scope as per GI. Goal Hb >8 to avoid demand ischemia. Continue Protonix once daily.   H&H monitoring. 5. Epistasis while on warfarin-resolved. Nasal saline, bacitracin ointment and highly humidified oxygen as per ENT  6. Supratherapeutic INR while on warfarin-resolved. 7. COPD exacerbation-on 2 L home oxygen-improved. Continue methylprednisone 40 mg daily and breathing treatment  8. Pain-less right knee large effusion s/p drainage > sterile. 9. TAVR for severe aortic stenosis 12/8/2020. ECHO with normal positioning of valve with normal ventricular function. 10. CAD s/p ANA for 90% LAD stenosis-stable. Continue Plavix, Imdur, Lipitor and metoprolol. 11. Discharge planning: Patient was not willing to go to rehab facility, she wanted to go to home. Talked to granddaughter on phone and she also preferred patient to be home.       Procedures/ Significant Interventions:    None    Consults:     Consults:     Final Specialist Recommendations/Findings:   IP CONSULT TO INTERNAL MEDICINE  IP CONSULT TO CARDIOLOGY  IP CONSULT TO GI  IP CONSULT TO CASE MANAGEMENT  IP CONSULT TO OTOLARYNGOLOGY  IP CONSULT TO PALLIATIVE CARE  IP CONSULT TO ORTHOPEDIC SURGERY  IP CONSULT TO IV TEAM  IP CONSULT TO HOME CARE NEEDS      Any Hospital Acquired Infections: none    Discharge Functional Status:  stable    DISCHARGE PLAN     Disposition: home    Patient Instructions: (VALTREX) 1 g tablet Take 1 tablet by mouth 3 times daily for 5 days Last dose on 9/14. Three more days of cycle left per son., Disp-15 tablet,R-0Normal      metoprolol tartrate 37.5 MG TABS Take 37.5 mg by mouth 2 times daily, Disp-60 tablet,R-3Normal      furosemide (LASIX) 40 MG tablet Take 1 tablet by mouth daily, Disp-60 tablet,R-3Normal         CONTINUE these medications which have NOT CHANGED    Details   losartan (COZAAR) 50 MG tablet Take 50 mg by mouth daily Historical Med      umeclidinium-vilanterol (ANORO ELLIPTA) 62.5-25 MCG/INH AEPB inhaler Inhale 1 puff into the lungs daily, Disp-1 each, R-3Normal      clopidogrel (PLAVIX) 75 MG tablet Take 1 tablet by mouth daily, Disp-30 tablet, R-3Normal      albuterol (ACCUNEB) 1.25 MG/3ML nebulizer solution Inhale 1 ampule into the lungs every 6 hours as needed for WheezingHistorical Med      FLOVENT  MCG/ACT inhaler Inhale 2 puffs into the lungs 2 times daily Historical Med      PROVENTIL  (90 BASE) MCG/ACT inhaler Inhale 1 puff into the lungs 4 times daily Historical Med      atorvastatin (LIPITOR) 10 MG tablet Take 10 mg by mouth daily Take 1/2 tablet daily. Historical Med      montelukast (SINGULAIR) 10 MG tablet Take 10 mg by mouth nightly. Acetaminophen (TYLENOL PO) Take  by mouth as needed.          STOP taking these medications       cephALEXin (KEFLEX) 500 MG capsule Comments:   Reason for Stopping:         celecoxib (CELEBREX) 200 MG capsule Comments:   Reason for Stopping:         warfarin (COUMADIN) 1 MG tablet Comments:   Reason for Stopping:         hydrochlorothiazide (MICROZIDE) 12.5 MG capsule Comments:   Reason for Stopping:         warfarin (COUMADIN) 4 MG tablet Comments:   Reason for Stopping:               Activity: activity as tolerated    Diet: cardiac diet    Follow-up:    María Allen, [facility]          Bill Campuzano MD  [address] [place] [id number]  [phone]    Schedule an appointment as soon as possible for a visit in 2 weeks  s/p hospitalization for epistaxis and supratherapeutic INR. Switched to Eliquis for atrial fibrillation    Jeannette Mendoza  [facility]  [phone]    Schedule an appointment as soon as possible for a visit in 1 week  s/p Rt knee effusion drainage during the hospitalization    Erik Quintanilla MD  [address]  [facility] [id number] [phone]    In 2 weeks  s/p hospitalization for epistaxis while on warfarin    Jin German MD  [address]  [phone]    Schedule an appointment as soon as possible for a visit in 1 week  s/p hospitalization for melena while on warfarin    Kuldeep Hall MD  [facility]   [phone]    Schedule an appointment as soon as possible for a visit in 1 week        Patient Instructions: Instructions:  1. Follow-up with PCP, cardiology, rheumatology, ENT and GI  2. Continue using home oxygen  3. Monitor blood pressure and oxygen saturation    New Medications/changes to medication:  1. Continue Lasix to 40 mg daily   2. Start taking Eliquis  3. Start taking prednisone as advised  4. Start taking hydralazine  5. Start using bacitracin ointment and nasal spray in each nostril as advised  6. Follow changes in your amlodipine, Cozaar, Imdur, metoprolol  Follow up labs: H&H in 7 days  Follow up imaging: none    Note that over 30 minutes was spent in preparing discharge papers, discussing discharge with patient, medication review, etc.      Windy Nunez MD, MD  Internal Medicine Resident, PGY-1  [facility];  [place], [place]  9/23/2020, 9:44 AM

## 2020-09-29 NOTE — CARE COORDINATION
[facility] Transitions Follow Up Call    2020    Patient: Daniel Castro  Patient : 1940   MRN: [id number]  Reason for Admission: Acute on chronic respiratory failure with hypoxia/Covid-19 monitoring  Discharge Date: 20 RARS: Readmission Risk Score: 31         1st attempt to reach patient for Covid-19 monitoring. [facility] requesting return call. Contact information provided. [phone]    Care Transitions Subsequent and Final Call    Subsequent and Final Calls  Are you currently active with any services?:  Home Health  Care Transitions Interventions  Other Interventions:             Follow Up  Future Appointments   Date Time Provider Bethanie Quintero   10/1/2020  2:45 Celine Jacques MD sv gr [facility]   10/7/2020 11:00 AM MD Zeferino Ambrocio [facility]       Fadi Isbell, DERRICK